# Patient Record
Sex: FEMALE | Race: WHITE | NOT HISPANIC OR LATINO | Employment: OTHER | URBAN - METROPOLITAN AREA
[De-identification: names, ages, dates, MRNs, and addresses within clinical notes are randomized per-mention and may not be internally consistent; named-entity substitution may affect disease eponyms.]

---

## 2022-09-13 LAB — NONINV COLON CA DNA+OCC BLD SCRN STL QL: NEGATIVE

## 2022-10-07 ENCOUNTER — OFFICE VISIT (OUTPATIENT)
Dept: PRIMARY CARE CLINIC | Facility: CLINIC | Age: 72
End: 2022-10-07
Payer: MEDICARE

## 2022-10-07 VITALS — WEIGHT: 193.81 LBS | TEMPERATURE: 98 F

## 2022-10-07 DIAGNOSIS — M15.9 PRIMARY OSTEOARTHRITIS INVOLVING MULTIPLE JOINTS: Chronic | ICD-10-CM

## 2022-10-07 DIAGNOSIS — C50.912 MALIGNANT NEOPLASM OF LEFT BREAST IN FEMALE, ESTROGEN RECEPTOR POSITIVE, UNSPECIFIED SITE OF BREAST: ICD-10-CM

## 2022-10-07 DIAGNOSIS — F41.1 GAD (GENERALIZED ANXIETY DISORDER): Chronic | ICD-10-CM

## 2022-10-07 DIAGNOSIS — G57.63 MORTON'S NEUROMA OF BOTH FEET: Chronic | ICD-10-CM

## 2022-10-07 DIAGNOSIS — L98.9 PERINEAL IRRITATION IN FEMALE: Chronic | ICD-10-CM

## 2022-10-07 DIAGNOSIS — Z85.3 HISTORY OF RIGHT BREAST CANCER: ICD-10-CM

## 2022-10-07 DIAGNOSIS — R00.0 TACHYCARDIA: Chronic | ICD-10-CM

## 2022-10-07 DIAGNOSIS — F32.5 MAJOR DEPRESSIVE DISORDER IN REMISSION, UNSPECIFIED WHETHER RECURRENT: Chronic | ICD-10-CM

## 2022-10-07 DIAGNOSIS — G47.09 OTHER INSOMNIA: Chronic | ICD-10-CM

## 2022-10-07 DIAGNOSIS — T78.40XA ALLERGY, INITIAL ENCOUNTER: Chronic | ICD-10-CM

## 2022-10-07 DIAGNOSIS — M79.10 MYALGIA: Chronic | ICD-10-CM

## 2022-10-07 DIAGNOSIS — G47.33 OSA ON CPAP: ICD-10-CM

## 2022-10-07 DIAGNOSIS — E03.9 ACQUIRED HYPOTHYROIDISM: Chronic | ICD-10-CM

## 2022-10-07 DIAGNOSIS — E78.5 HYPERLIPIDEMIA, UNSPECIFIED HYPERLIPIDEMIA TYPE: Chronic | ICD-10-CM

## 2022-10-07 DIAGNOSIS — E66.9 OBESITY WITH SERIOUS COMORBIDITY, UNSPECIFIED CLASSIFICATION, UNSPECIFIED OBESITY TYPE: ICD-10-CM

## 2022-10-07 DIAGNOSIS — Z17.0 MALIGNANT NEOPLASM OF LEFT BREAST IN FEMALE, ESTROGEN RECEPTOR POSITIVE, UNSPECIFIED SITE OF BREAST: ICD-10-CM

## 2022-10-07 DIAGNOSIS — R73.03 PREDIABETES: Chronic | ICD-10-CM

## 2022-10-07 PROCEDURE — 99203 OFFICE O/P NEW LOW 30 MIN: CPT | Mod: PBBFAC,PN | Performed by: INTERNAL MEDICINE

## 2022-10-07 PROCEDURE — 99999 PR PBB SHADOW E&M-NEW PATIENT-LVL III: ICD-10-PCS | Mod: PBBFAC,,, | Performed by: INTERNAL MEDICINE

## 2022-10-07 PROCEDURE — 99999 PR PBB SHADOW E&M-NEW PATIENT-LVL III: CPT | Mod: PBBFAC,,, | Performed by: INTERNAL MEDICINE

## 2022-10-07 PROCEDURE — 99215 PR OFFICE/OUTPT VISIT, EST, LEVL V, 40-54 MIN: ICD-10-PCS | Mod: S$PBB,,, | Performed by: INTERNAL MEDICINE

## 2022-10-07 PROCEDURE — 99215 OFFICE O/P EST HI 40 MIN: CPT | Mod: S$PBB,,, | Performed by: INTERNAL MEDICINE

## 2022-10-07 RX ORDER — CHOLECALCIFEROL (VITAMIN D3) 25 MCG
TABLET ORAL DAILY
COMMUNITY

## 2022-10-07 RX ORDER — CETIRIZINE HYDROCHLORIDE 10 MG/1
10 TABLET ORAL DAILY
COMMUNITY
End: 2023-10-24

## 2022-10-07 RX ORDER — METFORMIN HYDROCHLORIDE 500 MG/1
1000 TABLET ORAL DAILY
COMMUNITY
End: 2023-08-03 | Stop reason: SDUPTHER

## 2022-10-07 RX ORDER — ATORVASTATIN CALCIUM 20 MG/1
20 TABLET, FILM COATED ORAL DAILY
COMMUNITY
End: 2023-01-06

## 2022-10-07 RX ORDER — VENLAFAXINE HYDROCHLORIDE 150 MG/1
150 CAPSULE, EXTENDED RELEASE ORAL DAILY
COMMUNITY

## 2022-10-07 RX ORDER — TAMOXIFEN CITRATE 20 MG/1
20 TABLET ORAL DAILY
COMMUNITY
End: 2024-03-13

## 2022-10-07 RX ORDER — TRAZODONE HYDROCHLORIDE 50 MG/1
50 TABLET ORAL NIGHTLY
COMMUNITY
End: 2023-10-24 | Stop reason: SDUPTHER

## 2022-10-07 RX ORDER — LEVOTHYROXINE SODIUM 25 UG/1
25 TABLET ORAL
COMMUNITY
End: 2022-10-10 | Stop reason: SDUPTHER

## 2022-10-07 RX ORDER — MONTELUKAST SODIUM 10 MG/1
10 TABLET ORAL NIGHTLY
COMMUNITY
End: 2023-04-06 | Stop reason: SDUPTHER

## 2022-10-07 RX ORDER — VERAPAMIL HYDROCHLORIDE 120 MG/1
120 CAPSULE, EXTENDED RELEASE ORAL DAILY
COMMUNITY
End: 2022-12-05

## 2022-10-07 NOTE — PROGRESS NOTES
Alison Lobo  10/07/22  [id number]    Roselia Gillette MD  Patient Care Team:  Roselia Gillette MD as PCP - General (Internal Medicine)  Clarissa Stephenson NP as Nurse Practitioner (Family Medicine)    Visit Type: establish care    Chief Complaint:  Chief Complaint   Patient presents with    Establish Care     Generalized aches and pains      History of Present Illness: Ms. Alison Lobo is a 72 year old female who [facility] She is originally from [place] but left [place] 50+ years ago. 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 Recently returned to [place] for family reasons. 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    Medical issues include prediabetes, B Chun's neuroma/foot pain, JADA (CPAP), obesity, h/o R breast cancer 2002, L breast cancer 2018 (both small invasive ductal carcinoma), recurrent MDD, HLD, hypothyroid.    S/p hysterectomy and s/p lumpectomies and completed Tx for 2002 R breast cancer. Will be taking tamoxifen for L breast cancer at least through 2023.    Most recent local PCP, Dr. Grayson, [facility]. Has also established care w Gen surg Dr. Nathan at [facility] with whom she will f/u Q 6 mos for monitoring of her breast cancer, Psychiatrist Dr. Yates at [facility], in addition to Podiatrist, Pulmonologist and Ophthalmologist.    Current concerns include:  Sleep disturbance - sleep on-set insomnia, chronic  Generalized aches and pains (hands, shoulder, hips, knees)  3.   Chronic vaginal irritation   4.   L eye irritation - steroid drops not helping much.    Sleep study scheduled for this Sunday for CPAP titration and mask fitting.    [facility] Walking less recently due to foot pain and heat/humidity.      Would like any labwork or other studies done prior to scheduled in-person appointment so can  discuss at visit. Requests to check HgbA1c and Vit D.    Hosp/ED/Urgent Care:  22 [facility]    Recent appointments:   1 week ago Psychiatrist Dr. Yates [facility]  22 Podiatry [facility]  B Chun's neuroma/foot pain, R achilles tendinitis  22 Podiatry [facility]   [facility], [facility]  22 ENT Dr. Marisol ELIAS (consider [facility]?), seasonal allergies  22 Podiatry [facility]   22 Gen Surg Dr. Nathan [facility] breast cancer management  22 PCP Dr. Grayson [facility] referral to Psychiatry  22 PCP Dr. Grayson MARLEEN establish care  21 Daren Prince NP [facility] JADA, intolerant of CPAP    Upcoming appointments:  11/10/22 Gen Surg Dr. Jac SIM will f/u Q 6 mos  22 Psychiatry Dr. Yates 3 mos f/u     Future Appointments       Date Provider Specialty Appt Notes    2022 Roselia Gillette MD Primary Care 6 week f/u          The following were reviewed: Active problem list, medication list, allergies, family history, social history, and Health Maintenance.     History:  Past Medical History:   Diagnosis Date    Cancer     Hyperlipidemia      Past Surgical History:   Procedure Laterality Date    BREAST SURGERY       SECTION      HYSTERECTOMY       No family history on file.  Social History     Socioeconomic History    Marital status:      Patient Active Problem List   Diagnosis    Hyperlipidemia    Malignant neoplasm of left breast in female, estrogen receptor positive    History of right breast cancer    Obesity with serious comorbidity    Acquired hypothyroidism    JADA on CPAP    Tachycardia    Perineal irritation in female    Major depressive disorder in remission    MEHNAZ (generalized anxiety disorder)    Other insomnia    Prediabetes    Chun's neuroma of both feet    Primary osteoarthritis involving multiple joints    Myalgia    Allergies       Review of patient's allergies indicates:  No  Known Allergies    Medications:  Current Outpatient Medications on File Prior to Visit   Medication Sig Dispense Refill    atorvastatin (LIPITOR) 20 MG tablet Take 20 mg by mouth once daily.      cetirizine (ZYRTEC) 10 MG tablet Take 10 mg by mouth once daily.      levothyroxine (SYNTHROID) 25 MCG tablet Take 25 mcg by mouth before breakfast.      metFORMIN (GLUCOPHAGE) 500 MG tablet Take 1,000 mg by mouth once daily. Takes 2.5 500mg QD      montelukast (SINGULAIR) 10 mg tablet Take 10 mg by mouth every evening.      tamoxifen (NOLVADEX) 20 MG Tab Take 20 mg by mouth once daily.      traZODone (DESYREL) 50 MG tablet Take 50 mg by mouth every evening.      venlafaxine (EFFEXOR-XR) 150 MG Cp24 Take 150 mg by mouth once daily. Takes 2 pills QD      verapamiL (VERELAN) 120 MG C24P Take 120 mg by mouth once daily.      vitamin D (VITAMIN D3) 1000 units Tab once daily.       No current facility-administered medications on file prior to visit.       Medications have been reviewed and reconciled with patient at visit today.    Barriers to medications present (no )    Adverse reactions to current medications (no)    Over the counter medications reviewed (Yes) and if needed added to active Medication list.    Review of Systems   Constitutional:  Positive for activity change and fatigue.   HENT:  Negative for nasal congestion, dental problem, ear pain, sore throat and trouble swallowing.    Eyes:  Positive for itching and eye dryness.        L eye   Respiratory:  Negative for cough, shortness of breath and wheezing.         JADA - difficulty tolerating CPAP   Cardiovascular:  Negative for chest pain, palpitations, leg swelling and claudication.   Gastrointestinal:  Negative for abdominal pain, change in bowel habit and change in bowel habit.   Genitourinary:  Positive for vaginal dryness. Negative for bladder incontinence, dysuria and hematuria.        Vaginal irritation   Musculoskeletal:  Positive for arthralgias, gait  problem and myalgias.        Hands, shoulders, hips, knees, feet   Neurological:  Negative for dizziness, tremors, weakness and light-headedness.   Hematological:  Does not bruise/bleed easily.   Psychiatric/Behavioral:  Positive for sleep disturbance.         Chronic sleep on-set insomnia  JADA      Exam:  Vitals:    10/07/22 1301   Temp: 98 °F (36.7 °C)     Weight: 87.9 kg (193 lb 12.8 oz)   There is no height or weight on file to calculate BMI.    Physical Exam  Vitals reviewed.   Constitutional:       General: She is not in acute distress.     Appearance: Normal appearance. She is obese.   HENT:      Head: Normocephalic and atraumatic.      Right Ear: Tympanic membrane, ear canal and external ear normal.      Left Ear: Tympanic membrane, ear canal and external ear normal.      Nose: Nose normal. No congestion or rhinorrhea.      Mouth/Throat:      Mouth: Mucous membranes are moist.      Pharynx: Oropharynx is clear. No oropharyngeal exudate or posterior oropharyngeal erythema.   Eyes:      General: No scleral icterus.        Right eye: No discharge.         Left eye: No discharge.      Extraocular Movements: Extraocular movements intact.      Conjunctiva/sclera: Conjunctivae normal.   Neck:      Vascular: No carotid bruit.   Cardiovascular:      Rate and Rhythm: Regular rhythm.      Heart sounds: No murmur heard.     Comments: Borderline tachycardia (chronic per Ms. Lashell)  Pulmonary:      Effort: Pulmonary effort is normal.      Breath sounds: Normal breath sounds. No wheezing, rhonchi or rales.   Abdominal:      General: Bowel sounds are normal. There is no distension.      Palpations: Abdomen is soft.      Tenderness: There is no abdominal tenderness. There is no guarding.   Musculoskeletal:         General: Deformity present. No swelling.      Right lower leg: No edema.      Left lower leg: No edema.      Comments: Arthritic changes noted in hands   Lymphadenopathy:      Cervical: No cervical adenopathy.    Skin:     General: Skin is warm and dry.      Findings: No bruising, erythema or rash.   Neurological:      General: No focal deficit present.      Mental Status: She is alert and oriented to person, place, and time. Mental status is at baseline.      Cranial Nerves: No cranial nerve deficit.      Coordination: Coordination normal.      Gait: Gait normal.      Comments: L hip 4+/5 lifting from table against pressure and some discomfort w this effort   Psychiatric:         Mood and Affect: Mood normal.         Behavior: Behavior normal.         Thought Content: Thought content normal.         Judgment: Judgment normal.      Laboratory Reviewed: (Yes)  [facility] Labs 5/02/22: CMP wnl x glucose 109 CBC wnl HgbA1c 5.9% TSH 2.85 chol 137  HDL 40 LDL 54  5/19/21: vit D 45    Cologuard 9/13/22: Negative recommend re-screen 3 years.    DEXA 5/12/22:  L femoral neck T-score -0.9 L1-L4 T-score 4.0 Normal w FRAX score est 10 year hip frx risk 1.8%, any osteopenic frx risk 9.7%    Mammogram 5/05/22: There are scattered fibroglandular elements that could obscure a lesion on mammography.  FINDINGS: Bilateral lumpectomies with associated architectural distortion and focal asymmetry. Benign bilateral breast calcifications. IMPRESSION: No mammographic evidence of malignancy. BI-RADS: 2 - Benign Finding.      Assessment:   72 y.o. female with multiple co-morbid illnesses here for continued follow up of medical problems.      Diagnoses of Hyperlipidemia, unspecified hyperlipidemia type, Malignant neoplasm of left breast in female, estrogen receptor positive, unspecified site of breast, History of right breast cancer, Obesity with serious comorbidity, unspecified classification, unspecified obesity type, Acquired hypothyroidism, JADA on CPAP, Tachycardia, Perineal irritation in female, Major depressive disorder in remission, unspecified whether recurrent, MEHNAZ (generalized anxiety disorder), Other insomnia, Prediabetes, Chun's  neuroma of both feet, Primary osteoarthritis involving multiple joints, Myalgia, and Allergy, initial encounter were pertinent to this visit.      Plan:     Problem List Items Addressed This Visit          Neuro    Adiel's neuroma of both feet (Chronic)     Affecting gait and activities - cont f/u w Podiatry            Psychiatric    Major depressive disorder in remission (Chronic)     Stable - cont current Rx and f/u w psychiatry         MEHNAZ (generalized anxiety disorder) (Chronic)     Stable, cont current Rx and f/u w psychiatry - recommend daily movement, physical activity - discussed a couple of simple breathwork techmiques            Derm    Perineal irritation in female (Chronic)     Post-menopausal - atrophic vaginitis quite likely - will check w breast surg cxn risk of topical estrogen given h/o recurrent estrogen receptor positive breast cancer - meanwhile , advise hypoallergenic washes, detergents, no dryer sheets, soft cotton panties, hypoallergenic vaginal lubricant or moisturizer to area              Cardiac/Vascular    Hyperlipidemia (Chronic)     Repeat lipid panel - consider check C(P)K given c/o fatigue, myalgias - consider add CoQ10 - if LDL < 70 consider decrease dose of statin?         Tachycardia (Chronic)     Chronic per Ms. Lashell - taking verapamil - prior cardiology evaluation?            Oncology    Malignant neoplasm of left breast in female, estrogen receptor positive     S/p lumpectomy 2018 - cont f/u w Gen Surg q 6 mos - cont surveillance mammograms - cont tamoxifen         History of right breast cancer     2002 s/p lumpectomy and treatment            Endocrine    Acquired hypothyroidism (Chronic)     Last TSH wnl - cont low dose levothyroxine - repeat TSH next routine labs         Prediabetes (Chronic)     Cont metformin - cont healthier food and beverage choices - encourage increase movement, daily physical activity - recheck HgbA1c prior to next appointment          Obesity with  serious comorbidity     No height recorded - do not have BMI today - comorbidities include cardiac arrhythmia, JADA, prediabetes - encourage daily movement to extent able and cont healthy food and beverage choices            Orthopedic    Primary osteoarthritis involving multiple joints (Chronic)     Consider trial diclofenac gel? Encourage to stay active and mobile to extent able - consider check CRP/ADELE/RF?         Myalgia (Chronic)     Encourage to stay mobile and active to extent able - work on improving sleep - check CPK and mag w next labs - consider add CoQ10, maybe decrease statin?            Other    Other insomnia (Chronic)     Sleep onset insomnia - taking trazodone - discussed some strategies, techniques that may help w sleep - Ms. Lobo is in agreement w referral to [facility] Dianna Mcnulty for sleep focused CBT - [facility]         Allergies (Chronic)     Managing w cetirizine, singulair (eos not elevated)         JADA on CPAP     F/u sleep study scheduled for this weekend            Health Maintenance         Date Due Completion Date    Hepatitis C Screening Never done ---    Lipid Panel Never done ---    TETANUS VACCINE Never done ---    Mammogram Never done ---    DEXA Scan Never done ---    Colorectal Cancer Screening Never done ---    Shingles Vaccine (1 of 2) Never done ---    Pneumococcal Vaccines (Age 65+) (1 - PCV) Never done ---          Cologuard 9/13/22  DEXA 5/12/22   Mammogram 5/05/22    -Patient's lab results were reviewed and discussed with patient  -Treatment options and alternatives were discussed with the patient. Patient expressed understanding. Patient was given the opportunity to ask questions and be an active participant in their medical care. Patient had no further questions or concerns at this time.   -Documentation of patient's health and condition was obtained from family member who was present during visit.  -Patient is an overall moderate risk for health  complications from their medical conditions.     Follow up: Follow up in about 6 weeks (around 11/18/2022) for Follow Up.    Care Plan/Goals: Reviewed No   Goals    None       After visit summary printed and given to patient upon discharge.  Patient goals and care plan are included in After visit summary.    TOTAL TIME evaluating and managing this patient for this encounter was greater than 60 minutes. This time was spent personally by me on the following activities: review of patient's past medical history, assessing age-appropriate health maintenance needs, review of any interval history, review and interpretation of lab results, review and interpretation of imaging test results, review and interpretation of cardiology test results, reviewing consulting specialist notes, obtaining history from the patient and family, examination of the patient, medication reconciliation, managing and/or ordering prescription medications, ordering imaging tests, ordering referral to subspecialty provider(s), educating patient and answering their questions about diagnosis, treatment plan, and goals of treatment, discussing planned follow-up and final documentation of the visit. This time was exclusive of any separately billable procedures for this patient and exclusive of time spent treating any other patients.

## 2022-10-07 NOTE — PATIENT INSTRUCTIONS
Consider discuss w Gen Surg topical estrogen use for possible atrophic vaginitis  Meanwhile consider try a topical vaginal/lubricant/moisturizer.    Try get natural light with walk in am - getting late afternoon/early afternoon night may also help w sleep    Anticipate call from Dianna Mcnulty LCSW to discuss possible focused CBT.    Will order labwork for mid Nov - 3- 7 days prior to next f/u appt

## 2022-10-08 ENCOUNTER — PATIENT MESSAGE (OUTPATIENT)
Dept: PRIMARY CARE CLINIC | Facility: CLINIC | Age: 72
End: 2022-10-08
Payer: MEDICARE

## 2022-10-08 NOTE — ASSESSMENT & PLAN NOTE
Post-menopausal - atrophic vaginitis quite likely - will check w breast surg cxn risk of topical estrogen given h/o recurrent estrogen receptor positive breast cancer - meanwhile , advise hypoallergenic washes, detergents, no dryer sheets, soft cotton panties, hypoallergenic vaginal lubricant or moisturizer to area

## 2022-10-08 NOTE — ASSESSMENT & PLAN NOTE
Consider trial diclofenac gel? Encourage to stay active and mobile to extent able - consider check CRP/ADELE/RF?

## 2022-10-08 NOTE — ASSESSMENT & PLAN NOTE
Stable, cont current Rx and f/u w psychiatry - recommend daily movement, physical activity - discussed a couple of simple breathwork techmiques

## 2022-10-08 NOTE — ASSESSMENT & PLAN NOTE
Encourage to stay mobile and active to extent able - work on improving sleep - check CPK and mag w next labs - consider add CoQ10, maybe decrease statin?

## 2022-10-08 NOTE — ASSESSMENT & PLAN NOTE
Cont metformin - cont healthier food and beverage choices - encourage increase movement, daily physical activity - recheck HgbA1c prior to next appointment

## 2022-10-08 NOTE — ASSESSMENT & PLAN NOTE
Repeat lipid panel - consider check C(P)K given c/o fatigue, myalgias - consider add CoQ10 - if LDL < 70 consider decrease dose of statin?

## 2022-10-08 NOTE — ASSESSMENT & PLAN NOTE
No height recorded - do not have BMI today - comorbidities include cardiac arrhythmia, JADA, prediabetes - encourage daily movement to extent able and cont healthy food and beverage choices

## 2022-10-08 NOTE — ASSESSMENT & PLAN NOTE
Sleep onset insomnia - taking trazodone - discussed some strategies, techniques that may help w sleep - [facility]

## 2022-10-10 ENCOUNTER — PATIENT MESSAGE (OUTPATIENT)
Dept: PRIMARY CARE CLINIC | Facility: CLINIC | Age: 72
End: 2022-10-10
Payer: MEDICARE

## 2022-10-10 RX ORDER — LEVOTHYROXINE SODIUM 25 UG/1
25 TABLET ORAL
Qty: 30 TABLET | Refills: 5 | Status: SHIPPED | OUTPATIENT
Start: 2022-10-10 | End: 2023-04-14

## 2022-10-11 ENCOUNTER — PATIENT MESSAGE (OUTPATIENT)
Dept: ADMINISTRATIVE | Facility: HOSPITAL | Age: 72
End: 2022-10-11
Payer: MEDICARE

## 2022-10-13 ENCOUNTER — DOCUMENTATION ONLY (OUTPATIENT)
Dept: PRIMARY CARE CLINIC | Facility: CLINIC | Age: 72
End: 2022-10-13
Payer: MEDICARE

## 2022-10-13 ENCOUNTER — PATIENT MESSAGE (OUTPATIENT)
Dept: PRIMARY CARE CLINIC | Facility: CLINIC | Age: 72
End: 2022-10-13
Payer: MEDICARE

## 2022-10-13 NOTE — PROGRESS NOTES
ANTELMO received a referral from PCP and spoke with Ms. Lobo on the phone to offer support. She scheduled an in person session with ANTELMO on 10/20/22 at 11:45am

## 2022-10-19 ENCOUNTER — PATIENT MESSAGE (OUTPATIENT)
Dept: PRIMARY CARE CLINIC | Facility: CLINIC | Age: 72
End: 2022-10-19
Payer: MEDICARE

## 2022-11-02 ENCOUNTER — PATIENT MESSAGE (OUTPATIENT)
Dept: PRIMARY CARE CLINIC | Facility: CLINIC | Age: 72
End: 2022-11-02
Payer: MEDICARE

## 2022-11-02 DIAGNOSIS — M15.9 PRIMARY OSTEOARTHRITIS INVOLVING MULTIPLE JOINTS: ICD-10-CM

## 2022-11-02 DIAGNOSIS — R73.03 PREDIABETES: ICD-10-CM

## 2022-11-02 DIAGNOSIS — M25.50 ARTHRALGIA, UNSPECIFIED JOINT: Primary | ICD-10-CM

## 2022-11-02 DIAGNOSIS — E03.9 ACQUIRED HYPOTHYROIDISM: ICD-10-CM

## 2022-11-02 DIAGNOSIS — E55.9 VITAMIN D DEFICIENCY: ICD-10-CM

## 2022-11-02 DIAGNOSIS — E78.5 HYPERLIPIDEMIA, UNSPECIFIED HYPERLIPIDEMIA TYPE: ICD-10-CM

## 2022-11-02 DIAGNOSIS — M79.10 MYALGIA: ICD-10-CM

## 2022-11-07 ENCOUNTER — PATIENT MESSAGE (OUTPATIENT)
Dept: PRIMARY CARE CLINIC | Facility: CLINIC | Age: 72
End: 2022-11-07
Payer: MEDICARE

## 2022-11-07 ENCOUNTER — LAB VISIT (OUTPATIENT)
Dept: LAB | Facility: HOSPITAL | Age: 72
End: 2022-11-07
Attending: INTERNAL MEDICINE
Payer: MEDICARE

## 2022-11-07 ENCOUNTER — TELEPHONE (OUTPATIENT)
Dept: PRIMARY CARE CLINIC | Facility: CLINIC | Age: 72
End: 2022-11-07
Payer: MEDICARE

## 2022-11-07 ENCOUNTER — DOCUMENTATION ONLY (OUTPATIENT)
Dept: PRIMARY CARE CLINIC | Facility: CLINIC | Age: 72
End: 2022-11-07
Payer: MEDICARE

## 2022-11-07 DIAGNOSIS — E78.5 HYPERLIPIDEMIA, UNSPECIFIED HYPERLIPIDEMIA TYPE: Chronic | ICD-10-CM

## 2022-11-07 DIAGNOSIS — R73.03 PREDIABETES: ICD-10-CM

## 2022-11-07 DIAGNOSIS — E55.9 VITAMIN D DEFICIENCY: ICD-10-CM

## 2022-11-07 DIAGNOSIS — M79.10 MYALGIA: ICD-10-CM

## 2022-11-07 DIAGNOSIS — M79.10 MYALGIA: Primary | ICD-10-CM

## 2022-11-07 LAB
25(OH)D3+25(OH)D2 SERPL-MCNC: 58 NG/ML (ref 30–96)
ALBUMIN SERPL BCP-MCNC: 3.7 G/DL (ref 3.5–5.2)
ANION GAP SERPL CALC-SCNC: 10 MMOL/L (ref 8–16)
BUN SERPL-MCNC: 20 MG/DL (ref 8–23)
CALCIUM SERPL-MCNC: 9.8 MG/DL (ref 8.7–10.5)
CHLORIDE SERPL-SCNC: 106 MMOL/L (ref 95–110)
CHOLEST SERPL-MCNC: 138 MG/DL (ref 120–199)
CHOLEST/HDLC SERPL: 3.4 {RATIO} (ref 2–5)
CK SERPL-CCNC: 36 U/L (ref 20–180)
CO2 SERPL-SCNC: 23 MMOL/L (ref 23–29)
CREAT SERPL-MCNC: 0.6 MG/DL (ref 0.5–1.4)
EST. GFR  (NO RACE VARIABLE): >60 ML/MIN/1.73 M^2
GLUCOSE SERPL-MCNC: 79 MG/DL (ref 70–110)
HDLC SERPL-MCNC: 41 MG/DL (ref 40–75)
HDLC SERPL: 29.7 % (ref 20–50)
LDLC SERPL CALC-MCNC: 67 MG/DL (ref 63–159)
MAGNESIUM SERPL-MCNC: 1.9 MG/DL (ref 1.6–2.6)
NONHDLC SERPL-MCNC: 97 MG/DL
PHOSPHATE SERPL-MCNC: 3.3 MG/DL (ref 2.7–4.5)
POTASSIUM SERPL-SCNC: 4.4 MMOL/L (ref 3.5–5.1)
SODIUM SERPL-SCNC: 139 MMOL/L (ref 136–145)
TRIGL SERPL-MCNC: 150 MG/DL (ref 30–150)

## 2022-11-07 PROCEDURE — 80061 LIPID PANEL: CPT | Performed by: INTERNAL MEDICINE

## 2022-11-07 PROCEDURE — 83735 ASSAY OF MAGNESIUM: CPT | Performed by: INTERNAL MEDICINE

## 2022-11-07 PROCEDURE — 36415 COLL VENOUS BLD VENIPUNCTURE: CPT | Mod: PO | Performed by: INTERNAL MEDICINE

## 2022-11-07 PROCEDURE — 82550 ASSAY OF CK (CPK): CPT | Performed by: INTERNAL MEDICINE

## 2022-11-07 PROCEDURE — 80069 RENAL FUNCTION PANEL: CPT | Performed by: INTERNAL MEDICINE

## 2022-11-07 PROCEDURE — 82306 VITAMIN D 25 HYDROXY: CPT | Performed by: INTERNAL MEDICINE

## 2022-11-07 NOTE — TELEPHONE ENCOUNTER
Patient was contacted and her lab appointment was scheduled for this morning at 9:45 am. Patient was informed that the labs are fasting, she verbally understood the information given.

## 2022-11-07 NOTE — TELEPHONE ENCOUNTER
----- Message from Roselia Gillette MD sent at 11/7/2022  5:20 AM CST -----  Regarding: fasting labwork  Good morning - Ms. Lobo was going to be coming in to see Dianna today - we'd planned for her to have labwork prior to our next f/u appt - she's scheduled w me for 11/18. I've entered orders for labwork to be done today - best if could be done fasting if you could please call her to see if she can come fasting today - if not, can reschedule for another day this week.    Thank you!

## 2022-11-07 NOTE — PROGRESS NOTES
[facility] BEHAVIORAL HEALTH INTAKE    DATE:  11/7/2022  REFERRAL SOURCE:  Roselia Gillette MD  TYPE OF VISIT:  In person  LENGTH OF SESSION: 45  .  HISTORY OF PRESENTING ILLNESS:  Alison Lobo, a 72 y.o. female with history of Anxiety disorders; generalized anxiety disorder [F41.1].    CHIEF COMPLAINT/REASON FOR ENCOUNTER: Pt presented for initial assessment. Pt's chief complaint includes the following: anxiety.    PSYCHIATRIC HISTORY:  Patient does not currently have a psychiatrist.  Patient does not currently have a therapist. * she has seen a mental health professional in the past   Previous Psychiatric Hospitalizations:  No  History of Trauma:  No  History of Violence:  No  Access to a gun/weapon:  Yes  Previous Suicide Attempts:  No    Risk assessment:  Patient reports no suicidal ideation  Patient reports no homicidal ideation  Patient reports no self-injurious behavior  Patient reports no violent behavior    PSYCHIATRIC FAMILY HISTORY: not known    SUBSTANCE ABUSE HISTORY:  Tobacco:  No   Alcohol: none  Illicit Substances: No  Misuse of Prescription Medications:  No    MEDICAL HISTORY:  Past Medical History:   Diagnosis Date    Cancer     Hyperlipidemia          SOCIAL HISTORY (MARRIAGE, EMPLOYMENT, etc.):  Living Situation: lives alone   Relationship status single    Children/Family: 1 child   Supports: reports many friendships   Education/Vocation: PhD  Confucianism/Spirituality: no  Hobbies and Interests: art/painting    Current social stressors:   Concerns related to aging and anxiety. Trouble sleeping (life long sleep issues.) [place] [facility] She retired in [place] after 45 years, so moving back to  is a culture shock.     Current symptoms:  Depression: dysphoric mood and insomnia.  Anxiety: excessive worrying.  Insomnia: difficulty falling asleep and non-restful sleep.  Heidi:  denies.  Psychosis: denies .    MENTAL HEALTH STATUS EXAM  General Appearance:  unremarkable, age  appropriate   Speech: normal tone, normal rate, normal pitch, normal volume      Level of Cooperation: cooperative      Thought Processes: normal and logical   Mood: steady      Thought Content: normal, no suicidality, no homicidality, delusions, or paranoia   Affect: congruent and appropriate   Orientation: Oriented x3   Memory: recent >  intact, remote >  intact   Attention Span & Concentration: intact   Fund of General Knowledge: intact and appropriate to age and level of education   Judgment & Insight: good   Language  intact     Session Content/Presenting Problem Hx:  [facility]. She relocated to [place] after retiring in [place] after 45 years and she is having trouble to adjusting. She said covid changed her plans drastically and she has had to modify her perspective. She said aging has been difficult and it presents more opportunities to worry and she would like to develop better coping strategies to deal with her anxiety. She has chronic sleep issues, but reports that she could improve her sleepy hygiene practices. Her goals include: reducing her anxiety and learning new techniques, improved sleep, more focus.     [facility] her a couple behavioral modification practices between now and her next visit: She will track her bedtime routine by writing down what she is doing up to 2 hours before trying to go to sleep. She will also write down at least three behaviors/activities that she engages in this week that help reduce her anxiety (she said walking is one of them.) She is going to track her negative automatic thoughts related to aging and practice a reality checking strategy LCSW reviewed and practiced in session using a scale of 0-10 to dimitry her anxiety about a situation. LCSW to review upon next session.      STRENGTHS AND LIABILITIES: Strength: Patient accepts guidance/feedback, Strength: Patient is expressive/articulate., Strength: Patient is  intelligent., Strength: Patient is motivated for change.    IMPRESSION:   My diagnostic impression is Anxiety disorders; generalized anxiety disorder [F41.1]    TREATMENT GOALS: Anxiety: acquiring relapse prevention skills, reducing negative automatic thoughts, and reducing time spent worrying (<30 minutes/day)    PLAN: In this session a psychosocial evaluation was conducted to get history and determine a treatment plan. CBT will be utilized in future individual  therapy sessions to increase support and behavior modification.     NEXT APPOINTMENT:  11/14 at 11am

## 2022-11-08 ENCOUNTER — PATIENT MESSAGE (OUTPATIENT)
Dept: PRIMARY CARE CLINIC | Facility: CLINIC | Age: 72
End: 2022-11-08
Payer: MEDICARE

## 2022-11-08 DIAGNOSIS — M25.50 ARTHRALGIA, UNSPECIFIED JOINT: ICD-10-CM

## 2022-11-08 DIAGNOSIS — R73.03 PREDIABETES: Primary | ICD-10-CM

## 2022-11-08 DIAGNOSIS — E03.9 ACQUIRED HYPOTHYROIDISM: Chronic | ICD-10-CM

## 2022-11-08 NOTE — PROGRESS NOTES
Your results look fine and do not require any change in treatment.     Please contact me if you have any additional concerns.    Sincerely,    Roselia Gillette

## 2022-11-14 ENCOUNTER — DOCUMENTATION ONLY (OUTPATIENT)
Dept: PRIMARY CARE CLINIC | Facility: CLINIC | Age: 72
End: 2022-11-14
Payer: MEDICARE

## 2022-11-14 NOTE — PROGRESS NOTES
Individual Psychotherapy (ANTELMO)  Alison Lobo,  11/14/2022  DATE:  11/14/2022  TYPE OF VISIT:  In person  LENGTH OF SESSION: 45    Therapeutic Intervention: Met with patient for individual psychotherapy.    Chief complaint/reason for encounter: depression and anxiety       Session Content/Presenting Problem:  LCSW reviewed previous assignments with patient. She journals throughout the week and brought her notes in and said it was shocking to see how many automatic negative thoughts she has throughout the week. She reported that she can manage her anxiety by staying active/engaged, but she has trouble going to sleep at night and is traditionally a night owl. LCSW reviewed sleep hygiene and discussed a couple things to try in the next week between sessions: She usually drinks caffeine until 4pm, but she will try to move that time up. She will make a routine that helps wind her down and is not stimulating (she will try to end engaging night phone calls with friends by 9pm for example), she will stop screen/tv time by 9pm or 2 hours before bedtime (she set bedtime to 11pm.) She will journal before bed to help process some of her thoughts since she stays too busy during the day to process her thoughts.     She also shared a concern about not getting pleasure from eating, especially breakfast, but she is a late riser and doesn't like breakfast food. She expressed symptoms of anhedonia when engaging with food and LCSW suggested mindfulness practices during meals to reduce distractions to determine if it is appetite related or if she is not enjoying the experience of eating.     Current symptoms:  Depression: worthlessness/guilt and hopelessness.  Anxiety: excessive worrying and restlessness.  Insomnia: non-restful sleep and trouble falling asleep   Heidi:  denies.  Psychosis: denies .    Risk parameters:  Patient reports no suicidal ideation  Patient reports no homicidal ideation  Patient reports no self-injurious  behavior  Patient reports no violent behavior    MENTAL HEALTH STATUS EXAM  General Appearance:  unremarkable, age appropriate   Speech: normal tone, normal rate, normal pitch, normal volume      Level of Cooperation: cooperative      Thought Processes: normal and logical   Mood: Anxious mood      Thought Content: normal, no suicidality, no homicidality, delusions, or paranoia   Affect: congruent and appropriate   Orientation: Oriented x3   Attention Span & Concentration: intact   Fund of General Knowledge: intact and appropriate to age and level of education   Judgment & Insight: good     Language  intact       STRENGTHS AND LIABILITIES: Strength: Patient accepts guidance/feedback, Strength: Patient is expressive/articulate., Strength: Patient is intelligent., Strength: Patient is motivated for change.    IMPRESSION:   My diagnostic impression is Anxiety disorders; generalized anxiety disorder [F41.1] and Persistent Depressive Disorder, Dysthymia 300.4 (F34.1)    TREATMENT GOALS (per patient):    Treatment plan:  Target symptoms: anxiety   Why chosen therapy is appropriate versus another modality: relevant to diagnosis  Outcome monitoring methods: self-report, observation  Therapeutic intervention type: behavior modifying psychotherapy    Patient's response to intervention:  The patient's response to intervention is accepting.    Progress toward goals and other mental status changes:  The patient's progress toward goals is good.    Plan: Pt plans to continue individual psychotherapy CBT will be utilized in future individual therapy sessions to increase behavior modification.     Return to clinic: 2 weeks

## 2022-11-17 ENCOUNTER — LAB VISIT (OUTPATIENT)
Dept: LAB | Facility: HOSPITAL | Age: 72
End: 2022-11-17
Attending: NURSE PRACTITIONER
Payer: MEDICARE

## 2022-11-17 DIAGNOSIS — R73.03 PREDIABETES: ICD-10-CM

## 2022-11-17 DIAGNOSIS — M25.50 ARTHRALGIA, UNSPECIFIED JOINT: ICD-10-CM

## 2022-11-17 DIAGNOSIS — E03.9 ACQUIRED HYPOTHYROIDISM: Chronic | ICD-10-CM

## 2022-11-17 LAB
CRP SERPL-MCNC: 0.8 MG/L (ref 0–8.2)
ESTIMATED AVG GLUCOSE: 117 MG/DL (ref 68–131)
HBA1C MFR BLD: 5.7 % (ref 4–5.6)
RHEUMATOID FACT SERPL-ACNC: <13 IU/ML (ref 0–15)
TSH SERPL DL<=0.005 MIU/L-ACNC: 1.85 UIU/ML (ref 0.4–4)

## 2022-11-17 PROCEDURE — 83036 HEMOGLOBIN GLYCOSYLATED A1C: CPT | Performed by: INTERNAL MEDICINE

## 2022-11-17 PROCEDURE — 84443 ASSAY THYROID STIM HORMONE: CPT | Performed by: INTERNAL MEDICINE

## 2022-11-17 PROCEDURE — 86235 NUCLEAR ANTIGEN ANTIBODY: CPT | Mod: 59 | Performed by: INTERNAL MEDICINE

## 2022-11-17 PROCEDURE — 36415 COLL VENOUS BLD VENIPUNCTURE: CPT | Mod: PO | Performed by: INTERNAL MEDICINE

## 2022-11-17 PROCEDURE — 86140 C-REACTIVE PROTEIN: CPT | Performed by: INTERNAL MEDICINE

## 2022-11-17 PROCEDURE — 86038 ANTINUCLEAR ANTIBODIES: CPT | Performed by: INTERNAL MEDICINE

## 2022-11-17 PROCEDURE — 86039 ANTINUCLEAR ANTIBODIES (ANA): CPT | Performed by: INTERNAL MEDICINE

## 2022-11-17 PROCEDURE — 86431 RHEUMATOID FACTOR QUANT: CPT | Performed by: INTERNAL MEDICINE

## 2022-11-18 ENCOUNTER — PATIENT MESSAGE (OUTPATIENT)
Dept: ADMINISTRATIVE | Facility: HOSPITAL | Age: 72
End: 2022-11-18
Payer: MEDICARE

## 2022-11-18 ENCOUNTER — OFFICE VISIT (OUTPATIENT)
Dept: PRIMARY CARE CLINIC | Facility: CLINIC | Age: 72
End: 2022-11-18
Payer: MEDICARE

## 2022-11-18 VITALS
HEART RATE: 106 BPM | WEIGHT: 191.13 LBS | HEIGHT: 63 IN | DIASTOLIC BLOOD PRESSURE: 78 MMHG | OXYGEN SATURATION: 95 % | SYSTOLIC BLOOD PRESSURE: 130 MMHG | TEMPERATURE: 99 F | BODY MASS INDEX: 33.87 KG/M2

## 2022-11-18 DIAGNOSIS — Z12.11 SCREENING FOR COLON CANCER: ICD-10-CM

## 2022-11-18 DIAGNOSIS — M25.511 CHRONIC PAIN OF BOTH SHOULDERS: ICD-10-CM

## 2022-11-18 DIAGNOSIS — M25.512 CHRONIC PAIN OF BOTH SHOULDERS: ICD-10-CM

## 2022-11-18 DIAGNOSIS — R26.89 BALANCE PROBLEM: ICD-10-CM

## 2022-11-18 DIAGNOSIS — R76.8 ANA POSITIVE: ICD-10-CM

## 2022-11-18 DIAGNOSIS — R00.0 TACHYCARDIA: Primary | Chronic | ICD-10-CM

## 2022-11-18 DIAGNOSIS — M25.50 ARTHRALGIA, UNSPECIFIED JOINT: ICD-10-CM

## 2022-11-18 DIAGNOSIS — G89.29 CHRONIC PAIN OF BOTH SHOULDERS: ICD-10-CM

## 2022-11-18 DIAGNOSIS — M79.10 MYALGIA: ICD-10-CM

## 2022-11-18 LAB
ANA PATTERN 1: NORMAL
ANA SER QL IF: POSITIVE
ANA TITR SER IF: NORMAL {TITER}

## 2022-11-18 PROCEDURE — 99999 PR PBB SHADOW E&M-EST. PATIENT-LVL V: CPT | Mod: PBBFAC,,, | Performed by: INTERNAL MEDICINE

## 2022-11-18 PROCEDURE — 99214 OFFICE O/P EST MOD 30 MIN: CPT | Mod: S$PBB,,, | Performed by: INTERNAL MEDICINE

## 2022-11-18 PROCEDURE — 99215 OFFICE O/P EST HI 40 MIN: CPT | Mod: PBBFAC,PN | Performed by: INTERNAL MEDICINE

## 2022-11-18 PROCEDURE — 99214 PR OFFICE/OUTPT VISIT, EST, LEVL IV, 30-39 MIN: ICD-10-PCS | Mod: S$PBB,,, | Performed by: INTERNAL MEDICINE

## 2022-11-18 PROCEDURE — 99999 PR PBB SHADOW E&M-EST. PATIENT-LVL V: ICD-10-PCS | Mod: PBBFAC,,, | Performed by: INTERNAL MEDICINE

## 2022-11-18 RX ORDER — FLUTICASONE PROPIONATE 50 MCG
1 SPRAY, SUSPENSION (ML) NASAL DAILY
Qty: 9.9 ML | Refills: 0 | Status: SHIPPED | OUTPATIENT
Start: 2022-11-18 | End: 2023-01-17

## 2022-11-18 NOTE — PROGRESS NOTES
Alison Lobo  11/18/2022  [id number]    Roselia Gillette MD  Patient Care Team:  Roselia Gillette MD as PCP - General (Internal Medicine)  Clarissa Stephenson NP as Nurse Practitioner (Family Medicine)    Visit Type: routine scheduled f/u    Chief Complaint:  Chief Complaint   Patient presents with    Follow-up     Patient in for six week follow up and lab results      History of Present Illness: Ms. Alison Lobo is a 72 year old female here for 6 wk f/u. She is originally from [place] but left [place] 50+ years ago. 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 Recently returned to [place] for family reasons. Feeling less stressed having taken care of various house problems.    Medical issues include prediabetes, B Chun's neuroma/foot pain, JADA (CPAP), obesity, h/o R breast cancer 2002, L breast cancer 2018 (both small invasive ductal carcinoma), recurrent MDD, HLD, hypothyroid.    Still issues w foot pain limiting exercise - Mortons neuroma.   Cont c/o poor balance but thinks maybe mostly related to foot/feet?    Difficulty w CPAP.    S/p hysterectomy and s/p lumpectomies and completed Tx for 2002 R breast cancer. Will be taking tamoxifen for L breast cancer at least through 2023.    Has also established care w Gen surg Dr. Nathan at [facility] with whom she will f/u Q 6 mos for monitoring of her breast cancer, Psychiatrist Dr. Yates at [facility], in addition to Podiatrist, Pulmonologist and Ophthalmologist.    Here at [facility] she's met w Dianna Mcnulty LCSW, with plan for further f/u. She is interested in working on balance w PT here.    She is looking forward to getting away over Thanksgiving to spend time w people she cares about.    Hosp/ED/Urgent Care:  8/23/22 [facility]    Recent appointments:   11/14/22 [facility] Dianna Mcnulty LCSW  11/07/22 [facility] Dianna Mcnulty LCSW intake  10/07/22 [facility] Dr. Gillette establish care    Recent L  shoulder injection?  Sleep study?(scheduled for CPAP titration and mask fitting).  11/10/22 Gen Surg Dr. Nathan [facility] cancelled ( on leave of absence - plan is for Q 6mos f/u)    Upcoming appointments:  22 Psychiatry Dr. Yates 3 mos f/u     Future Appointments       Date Provider Specialty Appt Notes    2022 Osmani Reyes MD Cardiology Referral    2022 Roselia Gillette MD Primary Care f/u    2022 Javi Bess MD Rheumatology Myalgia [M79.10]  Arthralgia, unspecified joint [M25.50]  ADELE positive [R76.8]          The following were reviewed: Active problem list, medication list, allergies, family history, social history, and Health Maintenance.     History:  Past Medical History:   Diagnosis Date    Cancer     Hyperlipidemia      Past Surgical History:   Procedure Laterality Date    BREAST SURGERY       SECTION      HYSTERECTOMY       History reviewed. No pertinent family history.  Social History     Socioeconomic History    Marital status:    Tobacco Use    Smoking status: Never    Smokeless tobacco: Never   Substance and Sexual Activity    Drug use: Never     Patient Active Problem List   Diagnosis    Hyperlipidemia    Malignant neoplasm of left breast in female, estrogen receptor positive    History of right breast cancer    Obesity with serious comorbidity    Acquired hypothyroidism    JADA on CPAP    Tachycardia    Perineal irritation in female    Major depressive disorder in remission    MEHNAZ (generalized anxiety disorder)    Other insomnia    Prediabetes    Chun's neuroma of both feet    Primary osteoarthritis involving multiple joints    Myalgia    Allergies       Review of patient's allergies indicates:  No Known Allergies    Medications:  Current Outpatient Medications on File Prior to Visit   Medication Sig Dispense Refill    atorvastatin (LIPITOR) 20 MG tablet Take 20 mg by mouth once daily.      cetirizine (ZYRTEC) 10 MG tablet Take 10 mg by mouth once  daily.      levothyroxine (SYNTHROID) 25 MCG tablet Take 1 tablet (25 mcg total) by mouth before breakfast. 30 tablet 5    metFORMIN (GLUCOPHAGE) 500 MG tablet Take 1,000 mg by mouth once daily. Takes 2.5 500mg QD      montelukast (SINGULAIR) 10 mg tablet Take 10 mg by mouth every evening.      tamoxifen (NOLVADEX) 20 MG Tab Take 20 mg by mouth once daily.      traZODone (DESYREL) 50 MG tablet Take 50 mg by mouth every evening.      venlafaxine (EFFEXOR-XR) 150 MG Cp24 Take 150 mg by mouth once daily. Takes 2 pills QD      verapamiL (VERELAN) 120 MG C24P Take 120 mg by mouth once daily.      vitamin D (VITAMIN D3) 1000 units Tab once daily.       No current facility-administered medications on file prior to visit.     Medications have been reviewed and reconciled with patient at visit today.    Barriers to medications present (no )    Adverse reactions to current medications (no)    Over the counter medications reviewed (Yes) and if needed added to active Medication list.    Review of Systems   Constitutional:  Positive for appetite change and fatigue.        Poor appetite  Thinks she may have lost weight   HENT:  Positive for sinus pressure/congestion. Negative for nasal congestion, dental problem, ear pain, sore throat and trouble swallowing.         Chronic sinus issues    Eyes:  Positive for itching and eye dryness.        L eye   Respiratory:  Negative for cough, shortness of breath and wheezing.         JADA - difficulty tolerating CPAP   Cardiovascular:  Negative for chest pain, palpitations, leg swelling and claudication.   Gastrointestinal:  Negative for abdominal pain, change in bowel habit and change in bowel habit.   Genitourinary:  Positive for vaginal dryness. Negative for bladder incontinence, dysuria and hematuria.   Musculoskeletal:  Positive for arthralgias, gait problem and myalgias.        Hands, shoulders, hips, knees, feet   Neurological:  Negative for dizziness, tremors, weakness and  light-headedness.        C/o poor balance   Hematological:  Does not bruise/bleed easily.   Psychiatric/Behavioral:  Positive for dysphoric mood and sleep disturbance.         Chronic sleep on-set insomnia  JADA      Exam:  Initial VSS: /90 P 106 sat 95% T 99.2  Vitals:    11/18/22 1009   BP: 130/78   Pulse:    Temp:      Weight: 86.7 kg (191 lb 1.6 oz)   Body mass index is 33.85 kg/m².    Physical Exam  Vitals reviewed.   Constitutional:       General: She is not in acute distress.     Appearance: Normal appearance. She is obese. She is not ill-appearing.   HENT:      Head: Normocephalic and atraumatic.      Right Ear: Ear canal and external ear normal.      Left Ear: Ear canal and external ear normal.      Ears:      Comments: R serous efusion     Nose: Nose normal. No congestion or rhinorrhea.      Mouth/Throat:      Mouth: Mucous membranes are moist.   Eyes:      General: No scleral icterus.        Right eye: No discharge.         Left eye: No discharge.      Extraocular Movements: Extraocular movements intact.      Conjunctiva/sclera: Conjunctivae normal.   Neck:      Vascular: No carotid bruit.   Cardiovascular:      Rate and Rhythm: Regular rhythm. Tachycardia present.      Heart sounds: No murmur heard.     Comments: Borderline tachycardia (chronic per Ms. Lashell)  Pulmonary:      Effort: Pulmonary effort is normal.      Breath sounds: Normal breath sounds. No wheezing, rhonchi or rales.   Abdominal:      General: Bowel sounds are normal. There is no distension.      Palpations: Abdomen is soft.      Tenderness: There is no abdominal tenderness. There is no guarding.   Musculoskeletal:         General: Deformity present. No swelling.      Right lower leg: No edema.      Left lower leg: No edema.      Comments: Arthritic changes in hands   Lymphadenopathy:      Cervical: No cervical adenopathy.   Skin:     General: Skin is warm and dry.      Findings: No bruising, erythema or rash.   Neurological:       General: No focal deficit present.      Mental Status: She is alert and oriented to person, place, and time. Mental status is at baseline.      Cranial Nerves: No cranial nerve deficit.   Psychiatric:         Behavior: Behavior normal.         Thought Content: Thought content normal.         Judgment: Judgment normal.      Laboratory Reviewed: (Yes)  11/17/22: hemoglobin A1c 5.7%    11/07/22: CMP wnl eGFR > 60, CRP 0.8, lipid panel cholesterol 138 HDL 41 triglycerides 150 LDL 67, CPK 36, vitamin D 58,     [facility] Labs 5/02/22: CMP wnl x glucose 109 CBC wnl HgbA1c 5.9% TSH 2.85 chol 137  HDL 40 LDL 54  5/19/21: vit D 45    Cologuard 9/13/22: Negative recommend re-screen 3 years.    DEXA 5/12/22:  L femoral neck T-score -0.9 L1-L4 T-score 4.0 Normal w FRAX score est 10 year hip frx risk 1.8%, any osteopenic frx risk 9.7%    Mammogram 5/05/22: There are scattered fibroglandular elements that could obscure a lesion on mammography.  FINDINGS: Bilateral lumpectomies with associated architectural distortion and focal asymmetry. Benign bilateral breast calcifications. IMPRESSION: No mammographic evidence of malignancy. BI-RADS: 2 - Benign Finding.      Assessment:   72 y.o. female with multiple co-morbid illnesses here for continued follow up of medical problems.      The primary encounter diagnosis was Tachycardia. Diagnoses of Balance problem, Chronic pain of both shoulders, Myalgia, Arthralgia, unspecified joint, and ADELE positive were also pertinent to this visit.      Plan:     Problem List Items Addressed This Visit          Cardiac/Vascular    Tachycardia - Primary (Chronic)    Relevant Orders    Ambulatory referral/consult to Cardiology       Orthopedic    Myalgia (Chronic)    Relevant Orders    Ambulatory referral/consult to Rheumatology     Other Visit Diagnoses       Balance problem        Relevant Orders    Ambulatory referral/consult to Physical/Occupational Therapy    Chronic pain of both shoulders         Relevant Orders    Ambulatory referral/consult to Physical/Occupational Therapy    Ambulatory referral/consult to Pain Clinic    Arthralgia, unspecified joint        Relevant Orders    Ambulatory referral/consult to Rheumatology    ADELE positive        Relevant Orders    Ambulatory referral/consult to Rheumatology            Health Maintenance         Date Due Completion Date    Hepatitis C Screening Never done ---    Lipid Panel Never done ---    TETANUS VACCINE Never done ---    Mammogram Never done ---    DEXA Scan Never done ---    Colorectal Cancer Screening Never done ---    Shingles Vaccine (1 of 2) Never done ---    Pneumococcal Vaccines (Age 65+) (1 - PCV) Never done ---          Cologuard 9/13/22  DEXA 5/12/22   Mammogram 5/05/22    -Patient's lab results were reviewed and discussed with patient  -Treatment options and alternatives were discussed with the patient. Patient expressed understanding. Patient was given the opportunity to ask questions and be an active participant in their medical care. Patient had no further questions or concerns at this time.   -Documentation of patient's health and condition was obtained from family member who was present during visit.  -Patient is an overall moderate risk for health complications from their medical conditions.     Follow up: Follow up in about 4 weeks (around 12/16/2022) for Follow Up w me or Clarissa to recheck BP and R ear.    Care Plan/Goals: Reviewed No   Goals    None     After visit summary printed and given to patient upon discharge.  Patient goals and care plan are included in After visit summary.    TOTAL TIME evaluating and managing this patient for this encounter was greater than 60 minutes. This time was spent personally by me on the following activities: review of patient's past medical history, assessing age-appropriate health maintenance needs, review of any interval history, review and interpretation of lab results, review and interpretation of  imaging test results, review and interpretation of cardiology test results, reviewing consulting specialist notes, obtaining history from the patient and family, examination of the patient, medication reconciliation, managing and/or ordering prescription medications, ordering imaging tests, ordering referral to subspecialty provider(s), educating patient and answering their questions about diagnosis, treatment plan, and goals of treatment, discussing planned follow-up and final documentation of the visit. This time was exclusive of any separately billable procedures for this patient and exclusive of time spent treating any other patients.     Addendum  11/17/22 (resulted 11/19): ADELE 1:80 speckled  (resulted 11/22): ds DNA Ab neg 1:10 ADELE panel neg RF < 13    Has had most immunizations - [facility] - how to get updated in Health maintanence?

## 2022-11-18 NOTE — PATIENT INSTRUCTIONS
Encouraged cont stress management strategies -slow deep breathing etc.    Cont f/u w Dianna on sleep, etc.    Flonase daily for 4 weeks and consider intra-nasal saline water - can use as often as you like let us know if develop fever - increased pain in R ear, dental or sinus pain.    Consider trial CoQ10 supplement - may help w myalgias sometimes associated w statins

## 2022-11-19 ENCOUNTER — PATIENT MESSAGE (OUTPATIENT)
Dept: PRIMARY CARE CLINIC | Facility: CLINIC | Age: 72
End: 2022-11-19
Payer: MEDICARE

## 2022-11-19 NOTE — PROGRESS NOTES
Good morning Ms. Lashell - ADELE titer 1:80 speckled generally not considered significant. Can be associated with various autoimmune conditions but patients with titers of 1:80 usually have no autoimmune disease. There are further titers we can send - perhaps in concert with an e-consult from me to rheumatology - or alternatively you may want to meet with a rheumatologist yourself in person or via virtual visit.    Please let us know your preference.  Thank you,    Roselia Gillette MD, MPH  [facility]

## 2022-11-21 ENCOUNTER — TELEPHONE (OUTPATIENT)
Dept: PRIMARY CARE CLINIC | Facility: CLINIC | Age: 72
End: 2022-11-21
Payer: MEDICARE

## 2022-11-21 ENCOUNTER — DOCUMENTATION ONLY (OUTPATIENT)
Dept: PRIMARY CARE CLINIC | Facility: CLINIC | Age: 72
End: 2022-11-21
Payer: MEDICARE

## 2022-11-21 ENCOUNTER — PATIENT MESSAGE (OUTPATIENT)
Dept: PRIMARY CARE CLINIC | Facility: CLINIC | Age: 72
End: 2022-11-21
Payer: MEDICARE

## 2022-11-21 NOTE — TELEPHONE ENCOUNTER
----- Message from Roselia Gillette MD sent at 11/21/2022  4:24 PM CST -----  Regarding: Rheum referral  I've ordered referral to rheumatology for Ms. Lobo per her request: ADELE+ 1:80.  Is that soemthing we schedule or she schedules or will rheumatology dept contact her to schedule?  She's going out of town for Thanksgiving and will be back early Dec

## 2022-11-22 LAB
ANTI SM ANTIBODY: 0.08 RATIO (ref 0–0.99)
ANTI SM/RNP ANTIBODY: 0.06 RATIO (ref 0–0.99)
ANTI-SM INTERPRETATION: NEGATIVE
ANTI-SM/RNP INTERPRETATION: NEGATIVE
ANTI-SSA ANTIBODY: 0.07 RATIO (ref 0–0.99)
ANTI-SSA INTERPRETATION: NEGATIVE
ANTI-SSB ANTIBODY: 0.07 RATIO (ref 0–0.99)
ANTI-SSB INTERPRETATION: NEGATIVE
DSDNA AB SER-ACNC: NORMAL [IU]/ML

## 2022-11-28 ENCOUNTER — PATIENT MESSAGE (OUTPATIENT)
Dept: PRIMARY CARE CLINIC | Facility: CLINIC | Age: 72
End: 2022-11-28
Payer: MEDICARE

## 2022-11-29 DIAGNOSIS — R00.0 TACHYCARDIA: Primary | Chronic | ICD-10-CM

## 2022-12-01 ENCOUNTER — PATIENT MESSAGE (OUTPATIENT)
Dept: PRIMARY CARE CLINIC | Facility: CLINIC | Age: 72
End: 2022-12-01
Payer: MEDICARE

## 2022-12-01 ENCOUNTER — DOCUMENTATION ONLY (OUTPATIENT)
Dept: PRIMARY CARE CLINIC | Facility: CLINIC | Age: 72
End: 2022-12-01
Payer: MEDICARE

## 2022-12-01 NOTE — PROGRESS NOTES
Individual Psychotherapy (ANTELMO)  Alison Lobo,  12/1/2022  DATE:  12/1/2022  TYPE OF VISIT:  In person  LENGTH OF SESSION: 60    Therapeutic Intervention: Met with patient for individual psychotherapy.    Chief complaint/reason for encounter: anxiety and sleep       Session Content/Presenting Problem:  LCSW met with patient to discuss progress with her sleep habits and her anxious mood related to her move back to [place]. She has been consistently journaling about her thoughts and presents to therapy with her journal as a way to track progress and gain insight into her behaviors.     Patient reports improvement with sleepy hygiene and with quality of sleep. She recently got a CPAP and she said last night was the first night that she got quality sleep in a while. She has previously used a CPAP, but always quit using it because she couldn't get comfortable a night, but this time she was committed to making it work and she used an L shaped pillow for side sleepers to assist with getting comfortable. Today was the first morning she didn't wake up with brain fog in a long time. She also improved her sleepy hygiene habits: she has reduced her caffeine to one cup in the morning and she does not use electronics in the evenings and now reads hard cover books to wind down. She continues to talk with peers on the phone at night, but reports that this activity is not overly activating and she does not go to bed replaying those conversations in her head like she used to. Overall she reports a shift towards better quality sleep and it is easier to fall asleep. LCSW asked her to track her mood on a 0-10 scale for the next couple weeks to see if improved quality of sleep has an impact on her overall mood in the morning.     She continues to struggle with her mood in relation to her move back to [place]. The transition has been stressful for her and she reports that she often finds herself being negative about being back  "in [place]. She would like to shift towards being more positive about her decision to move back to [place] and she would like to feel "comfortable" in [place]. LCSW explored what "being comfortable" would mean and encouraged her to journal about what would make her feel "relevant" since this is one of her goals of therapy. She would like to get involved in [facility] [employer]'s sociology program since this is what her degree was in and work always made her feel productive and relevant. LCSW encouraged her to look for experience and friendships that encourage a sense of belonging.     Current symptoms:  Depression: dysphoric mood and hopelessness.  Anxiety: excessive worrying and restlessness.  Insomnia: difficulty falling asleep and non-restful sleep.  Heidi:  denies.  Psychosis: denies .    Risk parameters:  Patient reports no suicidal ideation  Patient reports no homicidal ideation  Patient reports no self-injurious behavior  Patient reports no violent behavior    MENTAL HEALTH STATUS EXAM  General Appearance:  unremarkable, age appropriate   Speech: normal tone, normal rate, normal pitch, normal volume      Level of Cooperation: cooperative      Thought Processes: normal and logical   Mood: steady      Thought Content: normal, no suicidality, no homicidality, delusions, or paranoia   Affect: congruent and appropriate   Orientation: Oriented x3   Attention Span & Concentration: intact   Fund of General Knowledge: intact and appropriate to age and level of education   Judgment & Insight: good     Language  intact       STRENGTHS AND LIABILITIES: Strength: Patient accepts guidance/feedback, Strength: Patient is expressive/articulate., Strength: Patient is intelligent., Strength: Patient is motivated for change.    IMPRESSION:   My diagnostic impression is Stress reaction emotional and Anxiety disorders; anxiety, unspecified [F41.9]  TREATMENT GOALS (per patient):    Treatment plan:  Target symptoms: anxiety , " adjustment  Why chosen therapy is appropriate versus another modality: relevant to diagnosis  Outcome monitoring methods: self-report, observation  Therapeutic intervention type: behavior modifying psychotherapy    Patient's response to intervention:  The patient's response to intervention is accepting.    Progress toward goals and other mental status changes:  The patient's progress toward goals is excellent.    Plan: Pt plans to continue individual psychotherapy CBT will be utilized in future individual therapy sessions to increase behavior modification.     Return to clinic: 2 weeks 12/12 9:45am

## 2022-12-05 ENCOUNTER — HOSPITAL ENCOUNTER (OUTPATIENT)
Dept: CARDIOLOGY | Facility: HOSPITAL | Age: 72
Discharge: HOME | End: 2022-12-05
Attending: INTERNAL MEDICINE
Payer: MEDICARE

## 2022-12-05 ENCOUNTER — CLINICAL SUPPORT (OUTPATIENT)
Dept: REHABILITATION | Facility: HOSPITAL | Age: 72
End: 2022-12-05
Attending: INTERNAL MEDICINE
Payer: MEDICARE

## 2022-12-05 ENCOUNTER — OFFICE VISIT (OUTPATIENT)
Dept: CARDIOLOGY | Facility: CLINIC | Age: 72
End: 2022-12-05
Payer: MEDICARE

## 2022-12-05 VITALS
RESPIRATION RATE: 16 BRPM | HEART RATE: 105 BPM | OXYGEN SATURATION: 97 % | SYSTOLIC BLOOD PRESSURE: 118 MMHG | WEIGHT: 193.56 LBS | BODY MASS INDEX: 34.3 KG/M2 | HEIGHT: 63 IN | DIASTOLIC BLOOD PRESSURE: 75 MMHG

## 2022-12-05 DIAGNOSIS — M25.511 CHRONIC PAIN OF BOTH SHOULDERS: ICD-10-CM

## 2022-12-05 DIAGNOSIS — R00.0 TACHYCARDIA: Chronic | ICD-10-CM

## 2022-12-05 DIAGNOSIS — M25.512 CHRONIC PAIN OF BOTH SHOULDERS: ICD-10-CM

## 2022-12-05 DIAGNOSIS — R73.03 PREDIABETES: Chronic | ICD-10-CM

## 2022-12-05 DIAGNOSIS — R26.89 BALANCE PROBLEM: ICD-10-CM

## 2022-12-05 DIAGNOSIS — E66.09 CLASS 1 OBESITY DUE TO EXCESS CALORIES WITH SERIOUS COMORBIDITY AND BODY MASS INDEX (BMI) OF 34.0 TO 34.9 IN ADULT: Primary | ICD-10-CM

## 2022-12-05 DIAGNOSIS — G89.29 CHRONIC PAIN OF BOTH SHOULDERS: ICD-10-CM

## 2022-12-05 PROCEDURE — 93010 ELECTROCARDIOGRAM REPORT: CPT | Mod: ,,, | Performed by: INTERNAL MEDICINE

## 2022-12-05 PROCEDURE — 99205 PR OFFICE/OUTPT VISIT, NEW, LEVL V, 60-74 MIN: ICD-10-PCS | Mod: S$PBB,,, | Performed by: INTERNAL MEDICINE

## 2022-12-05 PROCEDURE — 99999 PR PBB SHADOW E&M-EST. PATIENT-LVL IV: CPT | Mod: PBBFAC,,, | Performed by: INTERNAL MEDICINE

## 2022-12-05 PROCEDURE — 99205 OFFICE O/P NEW HI 60 MIN: CPT | Mod: S$PBB,,, | Performed by: INTERNAL MEDICINE

## 2022-12-05 PROCEDURE — 97162 PT EVAL MOD COMPLEX 30 MIN: CPT | Mod: PN

## 2022-12-05 PROCEDURE — 99214 OFFICE O/P EST MOD 30 MIN: CPT | Mod: PBBFAC | Performed by: INTERNAL MEDICINE

## 2022-12-05 PROCEDURE — 93005 ELECTROCARDIOGRAM TRACING: CPT

## 2022-12-05 PROCEDURE — 93010 EKG 12-LEAD: ICD-10-PCS | Mod: ,,, | Performed by: INTERNAL MEDICINE

## 2022-12-05 PROCEDURE — 99999 PR PBB SHADOW E&M-EST. PATIENT-LVL IV: ICD-10-PCS | Mod: PBBFAC,,, | Performed by: INTERNAL MEDICINE

## 2022-12-05 RX ORDER — METOPROLOL SUCCINATE 50 MG/1
50 TABLET, EXTENDED RELEASE ORAL DAILY
Qty: 30 TABLET | Refills: 11 | Status: SHIPPED | OUTPATIENT
Start: 2022-12-05 | End: 2023-08-29

## 2022-12-05 RX ORDER — ORAL SEMAGLUTIDE 7 MG/1
7 TABLET ORAL DAILY
Qty: 30 TABLET | Refills: 5 | Status: SHIPPED | OUTPATIENT
Start: 2022-12-05 | End: 2022-12-08 | Stop reason: SDUPTHER

## 2022-12-05 NOTE — PLAN OF CARE
[facility]   Physical Therapy Initial Evaluation   Date: 12/5/2022   Name: Alison Lobo  Clinic Number: [id number]    Therapy Diagnosis:   Encounter Diagnoses   Name Primary?    Balance problem     Chronic pain of both shoulders       Physician: Roselia Gillette MD     Physician Orders: PT Eval and Treat  Medical Diagnosis from Referral: R26.89 (ICD-10-CM) - Balance problem M25.511,G89.29,M25.512 (ICD-10-CM) - Chronic pain of both shoulders   Evaluation Date: 12/5/2022  Authorization Period Expiration: 11/18/23  Plan of Care Expiration: 1/6/23  Progress Note Due: 1/5/23  Visit # / Visits authorized: 1/1   FOTO: 0/3 - not available     Precautions: Standard    Time In: 1230  Time Out: 1330  Total Billable Time (timed & untimed codes): 60 minutes    SUBJECTIVE   Date of onset: She reports having joint pain for 1 year and she has started to have issues with her balance as a result    History of current condition - Chacha reports she that 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  She has the most issue with her shoulders and knees which contribute to balance deficits.     Imaging: [x] Xray [] MRI [] CT: Performed on: on feet about 6 months ago, results not available    Pain:  Current 5/10, worst 8/10, best 0/10   Location: [x] Right   [x] Left:  bilateral shoulders and bilateral feet   Description: Aching  Aggravating Factors: shoulders aggravated in bed and feet hurt when performing sit to stands and stairs  Easing Factors: lying down, taking medicine to assist    Prior Therapy:   [] N/A    [x] Yes: many years ago  Social History: Pt lives alone  Occupation: Pt is retired  Prior Level of Function: independent with all activities and loved to cross country ski and perform outdoor exercise like hiking  Current Level of Function: has had difficulty going up and down stairs and curbs, needing more assistance for balance.   Dominant Extremity:    [] Right    [x] Left    Pts goals:  to prevent  further falls and improve steadiness with gait.    Medical History:   Past Medical History:   Diagnosis Date    Cancer     Hyperlipidemia        Surgical History:   Alison Lobo  has a past surgical history that includes  section; Hysterectomy; and Breast surgery.    Medications:   Alison has a current medication list which includes the following prescription(s): atorvastatin, cetirizine, fluticasone propionate, levothyroxine, metformin, metoprolol succinate, montelukast, rybelsus, tamoxifen, trazodone, venlafaxine, and vitamin d.    Allergies:   Review of patient's allergies indicates:  No Known Allergies     OBJECTIVE     Posture:  Pt presents with postural abnormalities which include:    [x] Forward Head   [] Increased Lumbar Lordosis   [x] Rounded Shoulder   [] Genu Recurvatum   [] Increased Thoracic Kyphosis [] Genu Valgus   [] Trunk Deviated    [] Pes Planus   [] Scapular Winging    [] Other:     Flexibility:  Hamstrings: Within Normal Limits   Heel cords: Within Normal Limits       AROM:  Motion: Movement Results:   Cervical Flexion  one finger from chest   Cervical Extension 80%   Cervical Rotation 75% more pain on the left side   Upper Extremity IR reach (Medial Rotation) L3 to L5 worse on Left    Upper Extremity ER reach (External Rotation) C/T JUNCTION bilateral    Multi-Segmental Flexion mid shin   Multi-Segmental Extension 50%   Multi-Segmental Rotation 50%   Arms Down Deep Squat NT   Lateral trunk lean - Harder on Right for side bending, middle finger reaching just above knee, Left side at knee  Strength:     Right LE Left LE   Hip flexion 4/5 4/5   Hip extension 3+/5 3+/5   Hip abduction 4+/5 4+/5   Hip adduction 4+/5 4+/5   Knee flexion 4+/5 4+/5   Knee extension 5/5 5/5   Ankle dorsiflexion 4+/5 4+/5   Ankle plantarflexion 5/5 5/5       Shoulder AROM/PROM Right Left Pain/Dysfunction with Movement   Shoulder Flexion (180º) 150 160 Pain in Right > Left    Shoulder Abduction (180º) 145 160  Pain in Right > Left    Shoulder Extension (60º) WNL WNL       Sensation:  [x] Intact to Light Touch   [] Impaired:    Gait Analysis: The patient ambulated with the following assistive device: none; the pt presents with the following gait abnormalities: decreased step length bilateral    Fall risk assessment:    Performance Measurements Pt score Norms   Meehan Balance Scale Meehan Balance Scale    1. SITTING TO STANDIN - able to stand without using hands and stabilize independently    2. STANDING UNSUPPORTED:4 - able to stand safely 2 minutes without hold    3. SITTING WITH BACK UNSUPPORTED BUT FEET SUPPORTED ON FLOOR OR ON A STOOL: 4 - able to sit safely and securely 2 minutes    4. STANDING TO SITTIN - sits safely with minimal use of hands    5. TRANSFERS: 4 - able to trnasfer safely with minor use of hands    6. STANDING UNSUPPORTED WITH EYES CLOSED: 4 - able to stand 10 seconds safely    7. STANDING UNSUPPORTED WITH FEET TOGETHER: 4 - able to place feet together independently and stand 1 minute safely    8. REACHING FORWARD WITH OUTSTRETCHED ARM WHILE STANDIN - can reach forward confidently 25 cm/10 inches    9.  OBJECT FROM THE FLOOR FROM A STANDING POSITION:4 - able to  slipper safely and easily    10. TURNING TO LOOK BEHIND OVER LEFT AND RIGHT SHOULDERS WHILE STANDIN - looks behind from both sides and weights shifts well    11. TURN 360 DEGREES: 4 - able to turn 360 in seconds or less    12. PLACE ALTERNATE FOOT ON STEP OR STOOL WHILE STANDING UNSUPPORTED: 4 - able to stand independently safely and complete 8 steps in 20 seconds     13. STANDING UNSUPPORTED ONE FOOT IN FRONT: 4 - able to tandem stand independently and hold 30 seconds    14. STANDING ON ONE LE - tries to lift leg and unable to hold 3 seconds but remains standing independently      TOTAL SCORE: 53/56       51-56 = low fall risk  41-50 = increased fall risk  0 -40 = high fall risk   Single leg stance Right: 8  seconds ; Left 2 seconds- dominant leg  Less than 4.9 sec high risk  5-6.4 sec increased risk  6.5 or greater low risk   5x sit to stand 5.5 seconds Greater than 14 sec high risk  12.1-14 sec increased risk  12 sec or less low risk   Self paced walking speed Distance 25 ft (7.62 m),   Not Tested Less than 0.5 m/s high risk  Less than 1 m/s increased risk  Greater than 1 m/s low risk   Timed up and go 7.63 seconds Greater than 14 sec high risk  11.1-14 sec increased risk  11 sec or less low risk         Self reported measurements  Outcome Norms   FES-I NT/64 41-64 high risk  25-40 increased risk  16-24 low risk       Function:     CMS Impairment/Limitation/Restriction for FOTO  Survey -  Not available     Therapist reviewed FOTO scores for Chacha on 12/5/2022.   FOTO documents entered into [employer] - see Media section.    Limitation Score: %         TREATMENT     Total Treatment time (time-based codes) separate from Evaluation: (0) minutes     PATIENT EDUCATION AND HOME EXERCISES     Education provided: Pt was educated on Plan of Care to continue PT services at [facility] [place] location, then to transition back to the [facility] location at discharge.       Written Home Exercises Provided: madi Burnham demonstrated good  understanding of the education provided. See EMR under Patient Instructions for future exercises provided during therapy sessions.    ASSESSMENT     Alison is a 72 y.o. female referred to outpatient Physical Therapy with a medical diagnosis of R26.89 (ICD-10-CM) - Balance problem M25.511,G89.29,M25.512 (ICD-10-CM) - Chronic pain of both shoulders . Pt presents with impairments including: decreased ROM, decreased strength, decreased joint mobility, impaired coordination, impaired balance, postural abnormalities, and gait abnormalities.  She has been limited by pain up to 8/10 for about 1 year in bilateral feet and bilateral shoulders that limit her mobility, balance and overall function with her daily  responsibilities.  She would benefit from continued PT to address these deficits and return to her prior level of function.  At discharge, she would like to transition to an exercise program with the health  at the 65[facility] wellness program.      Pt prognosis is Good.   Pt will benefit from skilled outpatient Physical Therapy to address the deficits stated above and in the chart below, provide pt/family education, and to maximize pt's level of independence.     Plan of care discussed with patient: Yes  Pt's spiritual, cultural and educational needs considered and patient is agreeable to the plan of care and goals as stated below:     Anticipated Barriers for therapy: co-morbidities and chronicity of condition    Medical Necessity is demonstrated by the following  History  Co-morbidities and personal factors that may impact the plan of care Co-morbidities:   Cancer   Hyperlipidemia       Personal Factors:   coping style  lifestyle     moderate   Examination  Body Structures and Functions, activity limitations and participation restrictions that may impact the plan of care Body Regions:   lower extremities  upper extremities  trunk    Body Systems:    ROM  strength  gross coordinated movement  balance  gait  motor control  heart rate  blood pressure    Participation Restrictions:   none    Activity limitations:   Learning and applying knowledge  no deficits    General Tasks and Commands  no deficits    Communication  no deficits    Mobility  lifting and carrying objects  walking    Self care  no deficits    Domestic Life  shopping  doing house work (cleaning house, washing dishes, laundry)    Interactions/Relationships  family relationships    Life Areas  no deficits    Community and Social Life  community life  recreation and leisure  Shinto and spirituality         high   Clinical Presentation evolving clinical presentation with changing clinical characteristics moderate   Decision Making/ Complexity Score:  moderate           Short Term Goals:  4 weeks Status  Date Met   Pt to be educated on Home Exercise Program. [x] Progressing  [] Met  [] Not Met    Pt will improve shoulder AROM by 10 degrees or more in all classical planes in order to improve function. [x] Progressing  [] Met  [] Not Met    Patient will report less that 4/10 pain at worst in order to improve QOL. [x] Progressing  [] Met  [] Not Met    Patient will demonstrate independence with HEP in order to progress toward functional independence. [x] Progressing  [] Met  [] Not Met    Patient will improve score on FOTO to demonstrate improved mobility and functional independence.      Patient will improve bilateral single leg standing to 10 seconds to demonstrate improved balance.        PLAN   Plan of care Certification: 12/5/2022 to 1/6/23.    Outpatient Physical Therapy 1-2 times/ month for 8 weeks to include the following interventions: Neuromuscular Re-ed, Patient Education, Therapeutic Activities, and Therapeutic Exercise, manual therapy, E-stim attended and modalities to address deficits and work towards improved mobility and function.     Leni Luna, PT, DPT      I CERTIFY THE NEED FOR THESE SERVICES FURNISHED UNDER THIS PLAN OF TREATMENT AND WHILE UNDER MY CARE   Physician's comments:     Physician's Signature: ___________________________________________________

## 2022-12-05 NOTE — PROGRESS NOTES
Subjective:   Patient ID:  Alison Lobo is a 72 y.o. female who presents for evaluation of Tachycardia and General Cardio      73 yo female, referred to tachycardia. Retired.  PMH chronic tachycardia, h/o breast Ca in  and 2018, lumpectomy and XRT Rx on both sides, no chemo. HLD, obesity, preDM, JADA back to CPAP, no h/o MI CVA.  Tachy for years since .   She denied chest pain, dyspnea on exertion, palpitation, fainting, PND, orthopnea, syncope and claudication.   Exercise 3 times a week at [place]. No smoking. Occasional drinking                Past Medical History:   Diagnosis Date    Cancer     Hyperlipidemia        Past Surgical History:   Procedure Laterality Date    BREAST SURGERY       SECTION      HYSTERECTOMY         Social History     Tobacco Use    Smoking status: Never    Smokeless tobacco: Never   Substance Use Topics    Drug use: Never       History reviewed. No pertinent family history.    Review of Systems   Constitutional: Negative for decreased appetite, diaphoresis, fever, malaise/fatigue and night sweats.   HENT:  Negative for nosebleeds.    Eyes:  Negative for blurred vision and double vision.   Cardiovascular:  Negative for chest pain, claudication, dyspnea on exertion, irregular heartbeat, leg swelling, near-syncope, orthopnea, palpitations, paroxysmal nocturnal dyspnea and syncope.   Respiratory:  Negative for cough, shortness of breath, sleep disturbances due to breathing, snoring, sputum production and wheezing.    Endocrine: Negative for cold intolerance and polyuria.   Hematologic/Lymphatic: Does not bruise/bleed easily.   Skin:  Negative for rash.   Musculoskeletal:  Negative for back pain, falls, joint pain, joint swelling and neck pain.   Gastrointestinal:  Negative for abdominal pain, heartburn, nausea and vomiting.   Genitourinary:  Negative for dysuria, frequency and hematuria.   Neurological:  Negative for difficulty with concentration, dizziness, focal weakness,  headaches, light-headedness, numbness, seizures and weakness.   Psychiatric/Behavioral:  Negative for depression, memory loss and substance abuse. The patient does not have insomnia.    Allergic/Immunologic: Negative for HIV exposure and hives.     Objective:   Physical Exam  HENT:      Head: Normocephalic.   Eyes:      Pupils: Pupils are equal, round, and reactive to light.   Neck:      Thyroid: No thyromegaly.      Vascular: Normal carotid pulses. No carotid bruit or JVD.   Cardiovascular:      Rate and Rhythm: Normal rate and regular rhythm. No extrasystoles are present.     Chest Wall: PMI is not displaced.      Pulses: Normal pulses.      Heart sounds: Normal heart sounds. No murmur heard.    No gallop. No S3 sounds.   Pulmonary:      Effort: No respiratory distress.      Breath sounds: Normal breath sounds. No stridor.   Abdominal:      General: Bowel sounds are normal.      Palpations: Abdomen is soft.      Tenderness: There is no abdominal tenderness. There is no rebound.   Musculoskeletal:         General: Normal range of motion.   Skin:     Findings: No rash.   Neurological:      Mental Status: She is alert and oriented to person, place, and time.   Psychiatric:         Behavior: Behavior normal.     Lab Results   Component Value Date    CHOL 138 11/07/2022     Lab Results   Component Value Date    HDL 41 11/07/2022     Lab Results   Component Value Date    LDLCALC 67.0 11/07/2022     Lab Results   Component Value Date    TRIG 150 11/07/2022     Lab Results   Component Value Date    CHOLHDL 29.7 11/07/2022       Chemistry        Component Value Date/Time     11/07/2022 1213    K 4.4 11/07/2022 1213     11/07/2022 1213    CO2 23 11/07/2022 1213    BUN 20 11/07/2022 1213    CREATININE 0.6 11/07/2022 1213    GLU 79 11/07/2022 1213        Component Value Date/Time    CALCIUM 9.8 11/07/2022 1213          Lab Results   Component Value Date    HGBA1C 5.7 (H) 11/17/2022     Lab Results   Component  Value Date    TSH 1.849 11/17/2022     No results found for: INR, PROTIME  No results found for: WBC, HGB, HCT, MCV, PLT  BNP  @LABRCNTIP(BNP,BNPTRIAGEBLO)@  CrCl cannot be calculated (Patient's most recent lab result is older than the maximum 7 days allowed.).  No results found in the last 24 hours.  No results found in the last 24 hours.  No results found in the last 24 hours.    Assessment:      1. Class 1 obesity due to excess calories with serious comorbidity and body mass index (BMI) of 34.0 to 34.9 in adult    2. Tachycardia    3. Prediabetes        Plan:   Echo  D/c verapamil   Add metoprolol 50 mg daily  Add Rybelsus 7 mg daily for obesity  Discussed the benefits and risks of GLP-1 RA    Counseled DASH  Check Lipid profile in 6 months  Recommend heart-healthy diet, weight control and regular exercise.  Naseem. Risk modification.   I have reviewed all pertinent labs and cardiac studies independently. Plans and recommendations have been formulated under my direct supervision. All questions answered and patient voiced understanding.   If symptoms persist go to the ED  RTC in 12 months

## 2022-12-09 ENCOUNTER — PATIENT MESSAGE (OUTPATIENT)
Dept: PRIMARY CARE CLINIC | Facility: CLINIC | Age: 72
End: 2022-12-09
Payer: MEDICARE

## 2022-12-09 RX ORDER — ORAL SEMAGLUTIDE 7 MG/1
7 TABLET ORAL DAILY
Qty: 30 TABLET | Refills: 5 | Status: SHIPPED | OUTPATIENT
Start: 2022-12-09 | End: 2023-03-08

## 2022-12-12 ENCOUNTER — PATIENT MESSAGE (OUTPATIENT)
Dept: CARDIOLOGY | Facility: CLINIC | Age: 72
End: 2022-12-12
Payer: MEDICARE

## 2022-12-12 ENCOUNTER — TELEPHONE (OUTPATIENT)
Dept: PRIMARY CARE CLINIC | Facility: CLINIC | Age: 72
End: 2022-12-12
Payer: MEDICARE

## 2022-12-15 ENCOUNTER — OFFICE VISIT (OUTPATIENT)
Dept: RHEUMATOLOGY | Facility: CLINIC | Age: 72
End: 2022-12-15
Payer: MEDICARE

## 2022-12-15 ENCOUNTER — HOSPITAL ENCOUNTER (OUTPATIENT)
Dept: RADIOLOGY | Facility: HOSPITAL | Age: 72
Discharge: HOME | End: 2022-12-15
Attending: STUDENT IN AN ORGANIZED HEALTH CARE EDUCATION/TRAINING PROGRAM
Payer: MEDICARE

## 2022-12-15 VITALS
HEIGHT: 63 IN | DIASTOLIC BLOOD PRESSURE: 73 MMHG | WEIGHT: 193.56 LBS | SYSTOLIC BLOOD PRESSURE: 123 MMHG | HEART RATE: 122 BPM | BODY MASS INDEX: 34.3 KG/M2

## 2022-12-15 DIAGNOSIS — M25.50 ARTHRALGIA, UNSPECIFIED JOINT: ICD-10-CM

## 2022-12-15 DIAGNOSIS — G89.29 CHRONIC PAIN OF BOTH KNEES: Primary | ICD-10-CM

## 2022-12-15 DIAGNOSIS — M79.10 MYALGIA: ICD-10-CM

## 2022-12-15 DIAGNOSIS — M25.562 CHRONIC PAIN OF BOTH KNEES: ICD-10-CM

## 2022-12-15 DIAGNOSIS — M25.561 CHRONIC PAIN OF BOTH KNEES: Primary | ICD-10-CM

## 2022-12-15 DIAGNOSIS — M25.562 CHRONIC PAIN OF BOTH KNEES: Primary | ICD-10-CM

## 2022-12-15 DIAGNOSIS — R76.8 ANA POSITIVE: ICD-10-CM

## 2022-12-15 DIAGNOSIS — G89.29 CHRONIC PAIN OF BOTH KNEES: ICD-10-CM

## 2022-12-15 DIAGNOSIS — M25.561 CHRONIC PAIN OF BOTH KNEES: ICD-10-CM

## 2022-12-15 PROCEDURE — 99204 OFFICE O/P NEW MOD 45 MIN: CPT | Mod: S$PBB,,, | Performed by: STUDENT IN AN ORGANIZED HEALTH CARE EDUCATION/TRAINING PROGRAM

## 2022-12-15 PROCEDURE — 73562 X-RAY EXAM OF KNEE 3: CPT | Mod: TC,50

## 2022-12-15 PROCEDURE — 73562 X-RAY EXAM OF KNEE 3: CPT | Mod: 26,50,, | Performed by: RADIOLOGY

## 2022-12-15 PROCEDURE — 99999 PR PBB SHADOW E&M-EST. PATIENT-LVL IV: ICD-10-PCS | Mod: PBBFAC,,, | Performed by: STUDENT IN AN ORGANIZED HEALTH CARE EDUCATION/TRAINING PROGRAM

## 2022-12-15 PROCEDURE — 73562 XR KNEE ORTHO BILAT: ICD-10-PCS | Mod: 26,50,, | Performed by: RADIOLOGY

## 2022-12-15 PROCEDURE — 99999 PR PBB SHADOW E&M-EST. PATIENT-LVL IV: CPT | Mod: PBBFAC,,, | Performed by: STUDENT IN AN ORGANIZED HEALTH CARE EDUCATION/TRAINING PROGRAM

## 2022-12-15 PROCEDURE — 99204 PR OFFICE/OUTPT VISIT, NEW, LEVL IV, 45-59 MIN: ICD-10-PCS | Mod: S$PBB,,, | Performed by: STUDENT IN AN ORGANIZED HEALTH CARE EDUCATION/TRAINING PROGRAM

## 2022-12-15 PROCEDURE — 99214 OFFICE O/P EST MOD 30 MIN: CPT | Mod: PBBFAC | Performed by: STUDENT IN AN ORGANIZED HEALTH CARE EDUCATION/TRAINING PROGRAM

## 2022-12-15 RX ORDER — MELOXICAM 7.5 MG/1
7.5 TABLET ORAL DAILY
Qty: 30 TABLET | Refills: 3 | Status: SHIPPED | OUTPATIENT
Start: 2022-12-15 | End: 2023-03-08

## 2022-12-15 NOTE — PROGRESS NOTES
RHEUMATOLOGY CLINIC INITIAL VISIT    Reason for consult:- muscle pain, joint pain and positive ADELE    Chief complaints, HPI, ROS, EXAM, Assessment & Plans:-    Alison Lobo is a 72 y.o. pleasant female who presents to be evaluated for myalgias, arthralgias as well as positive ADELE.  Patient states she moved here about a year ago and feels the warm humid weather has not been helping with her pains.  States she is started to have pains in most of her joints including her knees, hands, shoulders, hips, neck.  She has been taking Aleve which does provide a lot of relief.  She feels her pain waxes and wanes.  In general she states activity seems to make it a little bit better.  She has about a 1 hour of morning stiffness.  Denies any noticeable joint swelling.  The patient denies fevers, patchy alopecia, oral/nasal ulcers, sicca symptoms, rashes, photosensitivity, pleuritic chest pain, shortness of breath, cough, abdominal pain, diarrhea, bloody stool, weakness, numbness/tingling, and Raynaud's.No evidence of synovitis, dactylitis or enthesitis.  She has some bony enlargement of the joints of her hands.  Crepitus of knees.       Reviewed all available old and outside pertinent medical records.    All lab results personally reviewed and interpreted by me.    1. Chronic pain of both knees    2. Myalgia    3. Arthralgia, unspecified joint    4. ADELE positive        Problem List Items Addressed This Visit          Orthopedic    Myalgia (Chronic)     Other Visit Diagnoses       Chronic pain of both knees    -  Primary    Relevant Medications    meloxicam (MOBIC) 7.5 MG tablet    Other Relevant Orders    X-ray Knee Ortho Bilateral (Completed)    Arthralgia, unspecified joint        Relevant Medications    meloxicam (MOBIC) 7.5 MG tablet    Other Relevant Orders    X-ray Knee Ortho Bilateral (Completed)    Cyclic Citrullinated Peptide Antibody, IgG (Completed)    C-Reactive Protein (Completed)    Sedimentation rate (Completed)     ADELE positive        Relevant Medications    meloxicam (MOBIC) 7.5 MG tablet    Other Relevant Orders    X-ray Knee Ortho Bilateral (Completed)    Cyclic Citrullinated Peptide Antibody, IgG (Completed)    C-Reactive Protein (Completed)    Sedimentation rate (Completed)            Patient presenting to be evaluated for polyarthralgias/myalgias as well as positive ADELE  ADELE positive at 1:80 speckled pattern with negative extended profile  Her inflammatory markers were not elevated  RF negative  normal CK  normal vitamin-D  History, exam and labwork thus far do not raise suspicion for underlying rheumatologic disease  Her pain seems more consistent with osteoarthritis  will check CCP and sed rate  Will obtain x-rays of knees  Trial low-dose meloxicam 7.5 mg daily  Encouraged to try to lose weight to slow progression of osteoarthritis  Recommended Mediterranean diet  Encouraged to try turmeric supplements    # Follow up in about 6 months (around 6/15/2023).    Chronic comorbid conditions affecting medical decision making today:    Past Medical History:   Diagnosis Date    Cancer     Hyperlipidemia        Past Surgical History:   Procedure Laterality Date    BREAST SURGERY       SECTION      HYSTERECTOMY          Social History     Tobacco Use    Smoking status: Never    Smokeless tobacco: Never   Substance Use Topics    Drug use: Never       History reviewed. No pertinent family history.    Review of patient's allergies indicates:  No Known Allergies    Medication List with Changes/Refills   New Medications    MELOXICAM (MOBIC) 7.5 MG TABLET    Take 1 tablet (7.5 mg total) by mouth once daily.   Current Medications    ATORVASTATIN (LIPITOR) 20 MG TABLET    Take 20 mg by mouth once daily.    CETIRIZINE (ZYRTEC) 10 MG TABLET    Take 10 mg by mouth once daily.    FLUTICASONE PROPIONATE (FLONASE) 50 MCG/ACTUATION NASAL SPRAY    1 spray (50 mcg total) by Each Nostril route once daily. Use daily for 28 days then  as needed    LEVOTHYROXINE (SYNTHROID) 25 MCG TABLET    Take 1 tablet (25 mcg total) by mouth before breakfast.    METFORMIN (GLUCOPHAGE) 500 MG TABLET    Take 1,000 mg by mouth once daily. Takes 2.5 500mg QD    METOPROLOL SUCCINATE (TOPROL-XL) 50 MG 24 HR TABLET    Take 1 tablet (50 mg total) by mouth once daily.    MONTELUKAST (SINGULAIR) 10 MG TABLET    Take 10 mg by mouth every evening.    SEMAGLUTIDE (RYBELSUS) 7 MG TABLET    Take 1 tablet (7 mg total) by mouth once daily.    TAMOXIFEN (NOLVADEX) 20 MG TAB    Take 20 mg by mouth once daily.    TRAZODONE (DESYREL) 50 MG TABLET    Take 50 mg by mouth every evening.    VENLAFAXINE (EFFEXOR-XR) 150 MG CP24    Take 150 mg by mouth once daily. Takes 2 pills QD    VITAMIN D (VITAMIN D3) 1000 UNITS TAB    once daily.         Disclaimer: This note was prepared using voice recognition system and is likely to have sound alike errors and is not proofread.  Please message me with any questions.    45 minutes of total time spent on the encounter, which includes face to face time and non-face to face time preparing to see the patient (eg, review of tests), Obtaining and/or reviewing separately obtained history, Documenting clinical information in the electronic or other health record, Independently interpreting results (not separately reported) and communicating results to the patient/family/caregiver, or Care coordination (not separately reported).     Thank you for allowing me to participate in the care of Alison Lashell.    Javi Bess MD

## 2022-12-17 ENCOUNTER — PATIENT MESSAGE (OUTPATIENT)
Dept: PRIMARY CARE CLINIC | Facility: CLINIC | Age: 72
End: 2022-12-17
Payer: MEDICARE

## 2022-12-19 ENCOUNTER — OFFICE VISIT (OUTPATIENT)
Dept: PRIMARY CARE CLINIC | Facility: CLINIC | Age: 72
End: 2022-12-19
Payer: MEDICARE

## 2022-12-19 ENCOUNTER — PATIENT OUTREACH (OUTPATIENT)
Dept: ADMINISTRATIVE | Facility: HOSPITAL | Age: 72
End: 2022-12-19
Payer: MEDICARE

## 2022-12-19 VITALS
DIASTOLIC BLOOD PRESSURE: 67 MMHG | BODY MASS INDEX: 34.29 KG/M2 | TEMPERATURE: 98 F | HEART RATE: 98 BPM | SYSTOLIC BLOOD PRESSURE: 136 MMHG | OXYGEN SATURATION: 98 % | HEIGHT: 63 IN

## 2022-12-19 DIAGNOSIS — F32.5 MAJOR DEPRESSIVE DISORDER IN REMISSION, UNSPECIFIED WHETHER RECURRENT: Primary | Chronic | ICD-10-CM

## 2022-12-19 DIAGNOSIS — F41.1 GAD (GENERALIZED ANXIETY DISORDER): Chronic | ICD-10-CM

## 2022-12-19 DIAGNOSIS — Z17.0 MALIGNANT NEOPLASM OF LEFT BREAST IN FEMALE, ESTROGEN RECEPTOR POSITIVE, UNSPECIFIED SITE OF BREAST: ICD-10-CM

## 2022-12-19 DIAGNOSIS — C50.912 MALIGNANT NEOPLASM OF LEFT BREAST IN FEMALE, ESTROGEN RECEPTOR POSITIVE, UNSPECIFIED SITE OF BREAST: ICD-10-CM

## 2022-12-19 DIAGNOSIS — M15.9 PRIMARY OSTEOARTHRITIS INVOLVING MULTIPLE JOINTS: Chronic | ICD-10-CM

## 2022-12-19 DIAGNOSIS — E66.09 CLASS 1 OBESITY DUE TO EXCESS CALORIES WITH SERIOUS COMORBIDITY AND BODY MASS INDEX (BMI) OF 34.0 TO 34.9 IN ADULT: Chronic | ICD-10-CM

## 2022-12-19 DIAGNOSIS — E03.9 ACQUIRED HYPOTHYROIDISM: Chronic | ICD-10-CM

## 2022-12-19 DIAGNOSIS — H65.91 RECURRENT SEROUS OTITIS MEDIA OF RIGHT EAR: ICD-10-CM

## 2022-12-19 DIAGNOSIS — R73.03 PREDIABETES: Chronic | ICD-10-CM

## 2022-12-19 PROCEDURE — 99213 OFFICE O/P EST LOW 20 MIN: CPT | Mod: PBBFAC,PN | Performed by: INTERNAL MEDICINE

## 2022-12-19 PROCEDURE — 99999 PR PBB SHADOW E&M-EST. PATIENT-LVL III: ICD-10-PCS | Mod: PBBFAC,,, | Performed by: INTERNAL MEDICINE

## 2022-12-19 PROCEDURE — 99999 PR PBB SHADOW E&M-EST. PATIENT-LVL III: CPT | Mod: PBBFAC,,, | Performed by: INTERNAL MEDICINE

## 2022-12-19 PROCEDURE — 99215 PR OFFICE/OUTPT VISIT, EST, LEVL V, 40-54 MIN: ICD-10-PCS | Mod: S$PBB,,, | Performed by: INTERNAL MEDICINE

## 2022-12-19 PROCEDURE — 99215 OFFICE O/P EST HI 40 MIN: CPT | Mod: S$PBB,,, | Performed by: INTERNAL MEDICINE

## 2022-12-19 NOTE — PATIENT INSTRUCTIONS
Recommend cont intranasal steriods and try other maneuvers to open estuchian tubes    Please forward HCV and Cologuard report to us if able    Contact PT about plan to defer until [facility]

## 2022-12-19 NOTE — PROGRESS NOTES
Alison Lobo  12/19/2022  [id number]    Roselia Gillette MD  Patient Care Team:  Roselia Gillette MD as PCP - General (Internal Medicine)  Clarissa Stephenson NP as Nurse Practitioner (Family Medicine)    Visit Type:a scheduled routine follow-up visit    Chief Complaint:  Chief Complaint   Patient presents with    Follow-up     History of Present Illness: Ms. Alison Lobo is a 72 year old female here for f/u. Medical issues include prediabetes, B Chun's neuroma/foot pain, JADA (CPAP), obesity, h/o R breast cancer 2002, L breast cancer 2018 (both small invasive ductal carcinoma), recurrent MDD, HLD, hypothyroid.    S/p hysterectomy and s/p lumpectomies and completed Tx for 2002 R breast cancer. Will be taking tamoxifen for L breast cancer at least through 2023.     Had established care w Gen surg Dr. Nathan at [facility] with whom plan was to f/u Q 6 mos for monitoring of her breast cancer (however may need to establish w a different Breast Oncologist?). Also established w Psychiatrist Dr. Yates at [facility], in addition to Podiatrist, Pulmonologist and Ophthalmologist.    Enjoyed her time away w friends over Thanksgiving.     Repeat sleep study completed. Doing better w CPAP.    Plan to start PT after holidays. Foot feeling better.   Rheum advised tumeric w black pepper and prescribed mobic.  Has found sessions w O65+ Dianna RODRIGES, to be helpful.    HR improved w switch from verapamil to metoprolol.      Hosp/ED/Urgent Care:  8/23/22 [facility]     Recent appointments:   12/15/22 Rheum Dr. Bess +ADELE, OA f/u 6 mos  12/05/22 Card Dr. Reyes d/c Verapamil start Metoprolol, Rybelsus f/u 1 year  12/01/22 O65+ Dianna Mcnulty LCSW (missed 12/12 appt)  11/18/22 O65+ Dr. Gillette   11/14/22 O65+ Dianna Mcnulty LCSW  11/07/22 O65+ Dianna Mcnulty LCSW intake  10/07/22 O65+ Dr. Gillette establish care     Recent L shoulder injection?  Sleep study (w CPAP titration and mask fitting).     Upcoming appointments:  12/23/22  Psychiatry Dr. Yates 3 mos f/u   Future Appointments       Date Provider Specialty Appt Notes    2/6/2023 Clarissa Stephenson NP Primary Care AWV    3/20/2023 Roselia Gillette MD Primary Care 3 month f/u    6/14/2023 Javi Bess MD Rheumatology [email or web address]    12/8/2023 Osmani Reyes MD Cardiology 1 year f/u           The following were reviewed: Active problem list, medication list, allergies, family history, social history, and Health Maintenance.     History:  Past Medical History:   Diagnosis Date    Cancer     Hyperlipidemia      Patient Active Problem List   Diagnosis    Hyperlipidemia    Malignant neoplasm of left breast in female, estrogen receptor positive    History of right breast cancer    Class 1 obesity due to excess calories with serious comorbidity and body mass index (BMI) of 34.0 to 34.9 in adult    Acquired hypothyroidism    JADA on CPAP    Perineal irritation in female    Major depressive disorder in remission    MEHNAZ (generalized anxiety disorder)    Other insomnia    Prediabetes    Chun's neuroma of both feet    Primary osteoarthritis involving multiple joints    Myalgia    Allergies    Recurrent serous otitis media of right ear     Review of patient's allergies indicates:  No Known Allergies    Medications:  Current Outpatient Medications on File Prior to Visit   Medication Sig Dispense Refill    atorvastatin (LIPITOR) 20 MG tablet Take 20 mg by mouth once daily.      cetirizine (ZYRTEC) 10 MG tablet Take 10 mg by mouth once daily.      levothyroxine (SYNTHROID) 25 MCG tablet Take 1 tablet (25 mcg total) by mouth before breakfast. 30 tablet 5    meloxicam (MOBIC) 7.5 MG tablet Take 1 tablet (7.5 mg total) by mouth once daily. 30 tablet 3    metFORMIN (GLUCOPHAGE) 500 MG tablet Take 1,000 mg by mouth once daily. Takes 2.5 500mg QD      metoprolol succinate (TOPROL-XL) 50 MG 24 hr tablet Take 1 tablet (50 mg total) by mouth once daily. 30 tablet 11    montelukast (SINGULAIR) 10 mg tablet Take  10 mg by mouth every evening.      semaglutide (RYBELSUS) 7 mg tablet Take 1 tablet (7 mg total) by mouth once daily. 30 tablet 5    tamoxifen (NOLVADEX) 20 MG Tab Take 20 mg by mouth once daily.      traZODone (DESYREL) 50 MG tablet Take 50 mg by mouth every evening.      venlafaxine (EFFEXOR-XR) 150 MG Cp24 Take 150 mg by mouth once daily. Takes 2 pills QD      vitamin D (VITAMIN D3) 1000 units Tab once daily.       No current facility-administered medications on file prior to visit.       Medications have been reviewed and reconciled with patient at visit today.    Barriers to medications present (no )    Adverse reactions to current medications (no)    Over the counter medications reviewed (Yes) and if needed added to active Medication list.    Review of Systems   Constitutional:  Negative for unexpected weight change.   HENT:  Positive for ear pain. Negative for mouth dryness, sore throat and trouble swallowing.         Intermittent R ear pain   Eyes:  Negative for photophobia and pain.   Respiratory:  Negative for cough, shortness of breath and wheezing.    Cardiovascular:  Negative for chest pain and palpitations.   Gastrointestinal:  Negative for abdominal pain, change in bowel habit and change in bowel habit.   Musculoskeletal:  Positive for arthralgias, gait problem and myalgias.        Foot pain affects gait, mobility but ambulates independently w/o AD   Integumentary:  Negative for rash.   Allergic/Immunologic: Positive for environmental allergies.   Psychiatric/Behavioral:  Positive for sleep disturbance.       Exam:  Vitals:    12/19/22 1549   BP: 136/67   Pulse: 98   Temp: 98.4 °F (36.9 °C)         Body mass index is 34.29 kg/m².    Physical Exam  Vitals reviewed.   Constitutional:       General: She is not in acute distress.     Appearance: Normal appearance. She is obese. She is not ill-appearing.   HENT:      Head: Normocephalic and atraumatic.      Right Ear: Ear canal and external ear normal.       Left Ear: Tympanic membrane, ear canal and external ear normal.      Ears:      Comments: Still w R serous effusion     Nose: Nose normal.   Eyes:      General:         Right eye: No discharge.         Left eye: No discharge.      Extraocular Movements: Extraocular movements intact.      Conjunctiva/sclera: Conjunctivae normal.   Cardiovascular:      Rate and Rhythm: Normal rate.   Pulmonary:      Effort: Pulmonary effort is normal.   Musculoskeletal:         General: Deformity present.      Comments: Arthritic changes in fingers/hands   Skin:     Findings: No bruising or rash.   Neurological:      General: No focal deficit present.      Mental Status: She is alert. Mental status is at baseline.   Psychiatric:         Behavior: Behavior normal.         Thought Content: Thought content normal.         Judgment: Judgment normal.      Laboratory Reviewed: (Yes)  Lab Results   Component Value Date    CHOL 138 11/07/2022    TRIG 150 11/07/2022    HDL 41 11/07/2022    LDLCALC 67.0 11/07/2022     11/07/2022    K 4.4 11/07/2022     11/07/2022    CREATININE 0.6 11/07/2022    BUN 20 11/07/2022    CO2 23 11/07/2022    MG 1.9 11/07/2022    TSH 1.849 11/17/2022    HGBA1C 5.7 (H) 11/17/2022    CRP 1.1 12/15/2022     Labs 12/15/22: ESR 11 CCP < 0.5 wnl  10/07/22: vit D 58    [facility] Labs 5/02/22: CMP wnl x glucose 109 CBC wnl HgbA1c 5.9% TSH 2.85 chol 137  HDL 40 LDL 54  5/19/21: vit D 45    X-ray B knees 12/15/22: There is mild bilateral tricompartmental osteoarthritis.    ECG 12/05/22: Vent. Rate : 107 BPM     Atrial Rate : 107 BPM   P-R Int : 142 ms          QRS Dur : 074 ms   QT Int : 326 ms       P-R-T Axes : 052 -03 046 degrees   QTc Int : 435 ms   Sinus tachycardia. Cannot rule out Anterior infarct, age undetermined    Assessment:   72 y.o. female with multiple co-morbid illnesses here for continued follow up of medical problems.      The primary encounter diagnosis was Major depressive disorder in remission,  unspecified whether recurrent. Diagnoses of MEHNAZ (generalized anxiety disorder), Malignant neoplasm of left breast in female, estrogen receptor positive, unspecified site of breast, Acquired hypothyroidism, Prediabetes, Class 1 obesity due to excess calories with serious comorbidity and body mass index (BMI) of 34.0 to 34.9 in adult, Recurrent serous otitis media of right ear, and Primary osteoarthritis involving multiple joints were also pertinent to this visit.      Plan:     Problem List Items Addressed This Visit          Psychiatric    Major depressive disorder in remission - Primary (Chronic)     Cont current Rx and f/u w Psychiatry -  - also encourage cont  F/u w O65+ LCSW as appropriate and work on increasing daily physical activity/movement and other behavioral/somatic interventions           MEHNAZ (generalized anxiety disorder) (Chronic)     Cont current Rx and f/u w Psychiatry - also encourage cont  F/u w O65+ LCSW as appropriate and work on increasing daily physical activity/movement and other behavioral/somatic interventions            ENT    Recurrent serous otitis media of right ear     Cont daily flonase x 4 weeks to see if helps to drain             Oncology    Malignant neoplasm of left breast in female, estrogen receptor positive     S/p lumpectomy 2018 - cont f/u w Gen Surg q 6 mos - cont surveillance mammograms - cont tamoxifen (may need to connect w new breast surgeon - consider Dr. Araujo? Dr. Santos?)            Endocrine    Class 1 obesity due to excess calories with serious comorbidity and body mass index (BMI) of 34.0 to 34.9 in adult (Chronic)     Cont metformin - cont healthier food and beverage choices - encourage increase movement, daily physical activity.  Rybelsus added by Cardiology, Dr. Ireland, but unclear if insurance will cover w/o diagnosis of Type 2 DM.          Acquired hypothyroidism (Chronic)     TSH wnl cont current Rx         Prediabetes (Chronic)     Cont metformin - cont  healthier food and beverage choices - encourage increase movement, daily physical activity.  Rybelsus added by Cardiology, Dr. Ireland, but unclear if insurance will cover w/o diagnosis of Type 2 DM.  - recheck HgbA1c prior to March appointment?            Orthopedic    Primary osteoarthritis involving multiple joints (Chronic)     Encourage to stay active and mobile to extent able - f/u w PT in Jan - meloxicam, tumeric added/recommended by Rheum            Health Maintenance         Date Due Completion Date    Hepatitis C Screening Never done ---    Colorectal Cancer Screening Never done ---    Shingles Vaccine (1 of 2) 10/27/2009 9/1/2009    Mammogram 09/10/2020 9/10/2019    Hemoglobin A1c (Prediabetes) 11/17/2023 11/17/2022    DEXA Scan 05/12/2025 5/12/2022    Lipid Panel 11/07/2027 11/7/2022    TETANUS VACCINE 05/09/2029 5/9/2019             Cologuard 9/13/22: Negative recommend re-screen 3 years.     DEXA 5/12/22:  L femoral neck T-score -0.9 L1-L4 T-score 4.0 Normal w FRAX score est 10 year hip frx risk 1.8%, any osteopenic frx risk 9.7%     Mammogram 5/05/22: There are scattered fibroglandular elements that could obscure a lesion on mammography.  FINDINGS: Bilateral lumpectomies with associated architectural distortion and focal asymmetry. Benign bilateral breast calcifications. IMPRESSION: No mammographic evidence of malignancy. BI-RADS: 2 - Benign Finding.      -Patient's lab results were reviewed and discussed with patient  -Treatment options and alternatives were discussed with the patient. Patient expressed understanding. Patient was given the opportunity to ask questions and be an active participant in their medical care. Patient had no further questions or concerns at this time.   -Patient is an overall moderate risk for health complications from their medical conditions.     Follow up: Follow up in about 3 months (around 3/19/2023) for Follow Up Dr. Nain mojica labwork prior, Follow Up.    After visit  summary printed and given to patient upon discharge.  Patient care plan included in After visit summary.    TOTAL TIME evaluating and managing this patient for this encounter was greater than 50 minutes. This time was spent personally by me on some of the following activities: review of patient's past medical history, assessing age-appropriate health maintenance needs, review of any interval history, review and interpretation of lab results, review and interpretation of imaging test results, review and interpretation of cardiology test results, reviewing consulting specialist notes, obtaining history from the patient and family, examination of the patient, medication reconciliation, managing and/or ordering prescription medications, ordering imaging tests, ordering referral to subspecialty provider(s), educating patient and answering their questions about diagnosis, treatment plan, and goals of treatment, discussing planned follow-up and final documentation of the visit. This time was exclusive of any separately billable procedures for this patient and exclusive of time spent treating any other patients.     Addendum  Echocardiogram 12/21/22: The left ventricle is normal in size with concentric remodeling and normal systolic function. The estimated ejection fraction is 60%. Normal left ventricular diastolic function. Normal right ventricular size with normal right ventricular systolic function. Normal central venous pressure (3 mmHg). The estimated PA systolic pressure is 21 mmHg.

## 2022-12-21 ENCOUNTER — HOSPITAL ENCOUNTER (OUTPATIENT)
Dept: CARDIOLOGY | Facility: HOSPITAL | Age: 72
Discharge: HOME | End: 2022-12-21
Attending: INTERNAL MEDICINE
Payer: MEDICARE

## 2022-12-21 VITALS
HEIGHT: 63 IN | DIASTOLIC BLOOD PRESSURE: 67 MMHG | SYSTOLIC BLOOD PRESSURE: 136 MMHG | WEIGHT: 193 LBS | HEART RATE: 80 BPM | BODY MASS INDEX: 34.2 KG/M2

## 2022-12-21 DIAGNOSIS — R00.0 TACHYCARDIA: ICD-10-CM

## 2022-12-21 LAB
AORTIC ROOT ANNULUS: 2.36 CM
ASCENDING AORTA: 2.97 CM
AV INDEX (PROSTH): 0.56
AV MEAN GRADIENT: 5 MMHG
AV PEAK GRADIENT: 8 MMHG
AV VALVE AREA: 1.57 CM2
AV VELOCITY RATIO: 0.62
BSA FOR ECHO PROCEDURE: 1.97 M2
CV ECHO LV RWT: 0.68 CM
DOP CALC AO PEAK VEL: 1.41 M/S
DOP CALC AO VTI: 29.6 CM
DOP CALC LVOT AREA: 2.8 CM2
DOP CALC LVOT DIAMETER: 1.89 CM
DOP CALC LVOT PEAK VEL: 0.87 M/S
DOP CALC LVOT STROKE VOLUME: 46.55 CM3
DOP CALC RVOT PEAK VEL: 0.68 M/S
DOP CALC RVOT VTI: 14.9 CM
DOP CALCLVOT PEAK VEL VTI: 16.6 CM
E WAVE DECELERATION TIME: 139.38 MSEC
E/A RATIO: 0.71
E/E' RATIO: 7.88 M/S
ECHO LV POSTERIOR WALL: 1.14 CM (ref 0.6–1.1)
EJECTION FRACTION: 60 %
FRACTIONAL SHORTENING: 32 % (ref 28–44)
INTERVENTRICULAR SEPTUM: 1.27 CM (ref 0.6–1.1)
IVRT: 82.78 MSEC
LA MAJOR: 4.48 CM
LA MINOR: 3.73 CM
LA WIDTH: 2.9 CM
LEFT ATRIUM SIZE: 3.04 CM
LEFT ATRIUM VOLUME INDEX MOD: 13.5 ML/M2
LEFT ATRIUM VOLUME INDEX: 16.1 ML/M2
LEFT ATRIUM VOLUME MOD: 25.66 CM3
LEFT ATRIUM VOLUME: 30.5 CM3
LEFT INTERNAL DIMENSION IN SYSTOLE: 2.28 CM (ref 2.1–4)
LEFT VENTRICLE DIASTOLIC VOLUME INDEX: 23.84 ML/M2
LEFT VENTRICLE DIASTOLIC VOLUME: 45.29 ML
LEFT VENTRICLE MASS INDEX: 67 G/M2
LEFT VENTRICLE SYSTOLIC VOLUME INDEX: 9.3 ML/M2
LEFT VENTRICLE SYSTOLIC VOLUME: 17.74 ML
LEFT VENTRICULAR INTERNAL DIMENSION IN DIASTOLE: 3.34 CM (ref 3.5–6)
LEFT VENTRICULAR MASS: 127.77 G
LV LATERAL E/E' RATIO: 8.38 M/S
LV SEPTAL E/E' RATIO: 7.44 M/S
LVOT MG: 1.97 MMHG
LVOT MV: 0.68 CM/S
MV PEAK A VEL: 0.94 M/S
MV PEAK E VEL: 0.67 M/S
MV STENOSIS PRESSURE HALF TIME: 40.42 MS
MV VALVE AREA P 1/2 METHOD: 5.44 CM2
PISA TR MAX VEL: 2.11 M/S
PULM VEIN S/D RATIO: 1.61
PV MEAN GRADIENT: 1.07 MMHG
PV PEAK D VEL: 0.36 M/S
PV PEAK S VEL: 0.58 M/S
PV PEAK VELOCITY: 1.01 CM/S
RA MAJOR: 3.53 CM
RA PRESSURE: 3 MMHG
RA WIDTH: 3.23 CM
RIGHT VENTRICULAR END-DIASTOLIC DIMENSION: 2.91 CM
SINUS: 2.28 CM
STJ: 1.92 CM
TDI LATERAL: 0.08 M/S
TDI SEPTAL: 0.09 M/S
TDI: 0.09 M/S
TR MAX PG: 18 MMHG
TV REST PULMONARY ARTERY PRESSURE: 21 MMHG

## 2022-12-21 PROCEDURE — 93306 TTE W/DOPPLER COMPLETE: CPT | Mod: 26,,, | Performed by: INTERNAL MEDICINE

## 2022-12-21 PROCEDURE — 93306 ECHO (CUPID ONLY): ICD-10-PCS | Mod: 26,,, | Performed by: INTERNAL MEDICINE

## 2022-12-21 PROCEDURE — 93306 TTE W/DOPPLER COMPLETE: CPT

## 2022-12-22 ENCOUNTER — PATIENT MESSAGE (OUTPATIENT)
Dept: CARDIOLOGY | Facility: CLINIC | Age: 72
End: 2022-12-22
Payer: MEDICARE

## 2022-12-30 PROBLEM — H65.91: Status: ACTIVE | Noted: 2022-12-21

## 2022-12-30 NOTE — ASSESSMENT & PLAN NOTE
Encourage to stay active and mobile to extent able - f/u w PT in [place] - meloxicam, tumeric added/recommended by Rheum

## 2022-12-30 NOTE — ASSESSMENT & PLAN NOTE
Cont current Rx and f/u w Psychiatry - also encourage cont  F/u w O65+ LCSW as appropriate and work on increasing daily physical activity/movement and other behavioral/somatic interventions

## 2022-12-30 NOTE — ASSESSMENT & PLAN NOTE
Cont metformin - cont healthier food and beverage choices - encourage increase movement, daily physical activity.  Rybelsus added by Cardiology, Dr. Ireland, but unclear if insurance will cover w/o diagnosis of Type 2 DM.

## 2022-12-30 NOTE — ASSESSMENT & PLAN NOTE
Cont metformin - cont healthier food and beverage choices - encourage increase movement, daily physical activity.  Rybelsus added by Cardiology, Dr. Ireland, but unclear if insurance will cover w/o diagnosis of Type 2 DM.  - recheck HgbA1c prior to March appointment?

## 2022-12-30 NOTE — ASSESSMENT & PLAN NOTE
Cont current Rx and f/u w Psychiatry -  - also encourage cont  F/u w O65+ LCSW as appropriate and work on increasing daily physical activity/movement and other behavioral/somatic interventions

## 2022-12-30 NOTE — ASSESSMENT & PLAN NOTE
S/p lumpectomy 2018 - cont f/u w Gen Surg q 6 mos - cont surveillance mammograms - cont tamoxifen (may need to connect w new breast surgeon - consider Dr. Araujo? Dr. Santos?)

## 2023-01-03 ENCOUNTER — PATIENT MESSAGE (OUTPATIENT)
Dept: PRIMARY CARE CLINIC | Facility: CLINIC | Age: 73
End: 2023-01-03
Payer: MEDICARE

## 2023-01-04 ENCOUNTER — DOCUMENTATION ONLY (OUTPATIENT)
Dept: PRIMARY CARE CLINIC | Facility: CLINIC | Age: 73
End: 2023-01-04
Payer: MEDICARE

## 2023-01-04 ENCOUNTER — PATIENT MESSAGE (OUTPATIENT)
Dept: PRIMARY CARE CLINIC | Facility: CLINIC | Age: 73
End: 2023-01-04
Payer: MEDICARE

## 2023-01-04 NOTE — PROGRESS NOTES
"  Individual Psychotherapy (LCSW)  Alison Delgadorer,  1/4/2023  DATE:  1/4/2023  TYPE OF VISIT:  {[facility] VISIT TYPE:75983}  LENGTH OF SESSION: {Length of Service:79827}    Therapeutic Intervention: Met with {Relatives of adult:20512::"patient"} for {PSY PLAN PHD/LCSW:50710}.    Chief complaint/reason for encounter: {PSY SW CHIEF COMPLAINTS:18339}       Session Content/Presenting Problem:      Current symptoms:  Depression: {Depression Symptoms:57565}.  Anxiety: {Anxiety Symptoms:20048}.  Insomnia:  denies  .  Heidi:  {Bipolar SX:63426}.  Psychosis: {Psychosis SX:82087}.    Risk parameters:  {parameters:54352::"Patient reports no suicidal ideation","Patient reports no homicidal ideation","Patient reports no self-injurious behavior","Patient reports no violent behavior"}    MENTAL HEALTH STATUS EXAM  General Appearance:  {appearance:12632::"unremarkable","age appropriate"}   Speech: {findings; speech psych:34172::"normal tone","normal rate","normal pitch","normal volume"}      Level of Cooperation: {cooperation:25920}      Thought Processes: {thought process:58196::"normal and logical"}   Mood: {mood:66518}      Thought Content: {thought content:53906::"normal, no suicidality, no homicidality, delusions, or paranoia"}   Affect: {desc; affect:63545::"congruent and appropriate"}   Orientation: {orientation:62734}   Attention Span & Concentration: {attention span:31788}   Fund of General Knowledge: {education:14317}   Judgment & Insight: {judgment/insight:90907}     Language  {PSY LANGUAGE:39519}       STRENGTHS AND LIABILITIES: {PSY STRENGTHS/LIABILITIES:72924}    IMPRESSION:   My diagnostic impression is {BHI DIAGNOSIS LIST:16513}, as evidenced by ***.     TREATMENT GOALS (per patient):    Treatment plan:  Target symptoms: {PSY TARGET SYMPTOMS:59700}  Why chosen therapy is appropriate versus another modality: {PSY THERAPY VS MODALITY:25576}  Outcome monitoring methods: {PSY OUTCOME MONITORING METHODS:97535}  Therapeutic " intervention type: {types:86853}    Patient's response to intervention:  The patient's response to intervention is {response:32139}.    Progress toward goals and other mental status changes:  The patient's progress toward goals is {progress:58245}.    Plan: Pt plans to continue {PSY PLAN PHD/LCSW:61078} {Therapy Types:67326} will be utilized in future {Treatment Types:78248} therapy sessions to increase {Treatment Types:16020}.     Return to clinic: {return:21329}

## 2023-01-09 ENCOUNTER — PATIENT MESSAGE (OUTPATIENT)
Dept: PRIMARY CARE CLINIC | Facility: CLINIC | Age: 73
End: 2023-01-09
Payer: MEDICARE

## 2023-01-12 ENCOUNTER — DOCUMENTATION ONLY (OUTPATIENT)
Dept: PRIMARY CARE CLINIC | Facility: CLINIC | Age: 73
End: 2023-01-12
Payer: MEDICARE

## 2023-01-12 NOTE — PROGRESS NOTES
"Individual Psychotherapy (LCSW) Termination Session  Alison Lobo,  1/12/2023  DATE:  1/12/2023  TYPE OF VISIT:  In person  LENGTH OF SESSION: 60    Therapeutic Intervention: Met with patient for individual psychotherapy.    Chief complaint/reason for encounter: depression     Session Content/Presenting Problem:  LCSW met with patient for a termination session. She is progressing towards her goal of becoming part of the [employer] since her move from [place]. She is more involved with a friend that needs her support and she has a network of other friends that she can call for various social engagements. She would like to find more opportunities to be "relevant" now that she is retired. 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 She has also made significant progress with her sleep hygiene and she exhibited great insights and has modified her behaviors to promote improved sleep. Improved sleep has in turn improved her mental clarify and mood. The end of the session was spent processing her relationship with her daughter and identifying ANTs and behavior patterns that lead to miscommunication with her daughter. She ended the session realizing that she does not have control over her daughters behaviors or mood and she would like to spend more time focusing on her own happiness. She will reach out to new [facility] when she is ready, but for now she will continue to utilizing journaling  to explore what new values and goals she would like to work on. Patient to f/u as needed.     Current symptoms:  Depression: denies.  Anxiety: denies.  Insomnia:  denies .  Heidi:  denies.  Psychosis: denies .      Risk parameters:  Patient reports no suicidal ideation  Patient reports no homicidal ideation  Patient reports no self-injurious behavior  Patient reports no violent behavior    MENTAL HEALTH STATUS EXAM  General Appearance:  unremarkable, age appropriate   Speech: " normal tone, normal rate, normal pitch, normal volume      Level of Cooperation: cooperative      Thought Processes: normal and logical   Mood: steady      Thought Content: normal, no suicidality, no homicidality, delusions, or paranoia   Affect: congruent and appropriate   Orientation: Oriented x3   Attention Span & Concentration: intact   Fund of General Knowledge: intact and appropriate to age and level of education   Judgment & Insight: good     Language  intact       STRENGTHS AND LIABILITIES: Strength: Patient accepts guidance/feedback, Strength: Patient is expressive/articulate.    IMPRESSION:   My diagnostic impression is Anxiety disorders; anxiety, unspecified [F41.9] and Persistent Depressive Disorder, Dysthymia 300.4 (F34.1).    TREATMENT GOALS (per patient):    Treatment plan:  Target symptoms: depression, anxiety   Why chosen therapy is appropriate versus another modality: evidence based practice  Outcome monitoring methods: self-report, observation  Therapeutic intervention type: behavior modifying psychotherapy    Patient's response to intervention:  The patient's response to intervention is accepting.    Progress toward goals and other mental status changes:  The patient's progress toward goals is good.    Plan: Pt plans to continue individual psychotherapy CBT will be utilized in future individual therapy sessions to increase behavior modification.     Return to clinic: as needed

## 2023-01-24 ENCOUNTER — PATIENT MESSAGE (OUTPATIENT)
Dept: PRIMARY CARE CLINIC | Facility: CLINIC | Age: 73
End: 2023-01-24
Payer: MEDICARE

## 2023-02-02 ENCOUNTER — DOCUMENTATION ONLY (OUTPATIENT)
Dept: PRIMARY CARE CLINIC | Facility: CLINIC | Age: 73
End: 2023-02-02
Payer: MEDICARE

## 2023-02-02 LAB
BMD RECOMMENDATION EXT: NORMAL
BMD RECOMMENDATION EXT: NORMAL

## 2023-02-17 ENCOUNTER — PATIENT OUTREACH (OUTPATIENT)
Dept: ADMINISTRATIVE | Facility: HOSPITAL | Age: 73
End: 2023-02-17
Payer: MEDICARE

## 2023-03-08 ENCOUNTER — OFFICE VISIT (OUTPATIENT)
Dept: PRIMARY CARE CLINIC | Facility: CLINIC | Age: 73
End: 2023-03-08
Payer: MEDICARE

## 2023-03-08 VITALS
HEART RATE: 93 BPM | HEIGHT: 63 IN | BODY MASS INDEX: 33.78 KG/M2 | TEMPERATURE: 98 F | WEIGHT: 190.63 LBS | OXYGEN SATURATION: 97 % | SYSTOLIC BLOOD PRESSURE: 135 MMHG | DIASTOLIC BLOOD PRESSURE: 63 MMHG

## 2023-03-08 DIAGNOSIS — M15.9 PRIMARY OSTEOARTHRITIS INVOLVING MULTIPLE JOINTS: Chronic | ICD-10-CM

## 2023-03-08 DIAGNOSIS — G57.63 MORTON'S NEUROMA OF BOTH FEET: Chronic | ICD-10-CM

## 2023-03-08 DIAGNOSIS — E03.9 ACQUIRED HYPOTHYROIDISM: Chronic | ICD-10-CM

## 2023-03-08 DIAGNOSIS — E78.5 HYPERLIPIDEMIA, UNSPECIFIED HYPERLIPIDEMIA TYPE: Chronic | ICD-10-CM

## 2023-03-08 DIAGNOSIS — Z85.3 HISTORY OF RIGHT BREAST CANCER: ICD-10-CM

## 2023-03-08 DIAGNOSIS — H57.9 RIGHT EYE SYMPTOMS: ICD-10-CM

## 2023-03-08 DIAGNOSIS — N39.3 STRESS INCONTINENCE OF URINE: ICD-10-CM

## 2023-03-08 DIAGNOSIS — G47.33 OSA ON CPAP: Chronic | ICD-10-CM

## 2023-03-08 DIAGNOSIS — F41.1 GAD (GENERALIZED ANXIETY DISORDER): Chronic | ICD-10-CM

## 2023-03-08 DIAGNOSIS — R73.03 PREDIABETES: Chronic | ICD-10-CM

## 2023-03-08 DIAGNOSIS — F32.5 MAJOR DEPRESSIVE DISORDER IN REMISSION, UNSPECIFIED WHETHER RECURRENT: Chronic | ICD-10-CM

## 2023-03-08 DIAGNOSIS — M19.90 OSTEOARTHRITIS, UNSPECIFIED OSTEOARTHRITIS TYPE, UNSPECIFIED SITE: ICD-10-CM

## 2023-03-08 DIAGNOSIS — H65.91 RECURRENT SEROUS OTITIS MEDIA OF RIGHT EAR: ICD-10-CM

## 2023-03-08 DIAGNOSIS — R32 URINARY INCONTINENCE, UNSPECIFIED TYPE: ICD-10-CM

## 2023-03-08 DIAGNOSIS — Z00.00 ENCOUNTER FOR PREVENTIVE HEALTH EXAMINATION: Primary | ICD-10-CM

## 2023-03-08 LAB
ALBUMIN SERPL BCP-MCNC: 3.7 G/DL (ref 3.5–5.2)
ALP SERPL-CCNC: 64 U/L (ref 55–135)
ALT SERPL W/O P-5'-P-CCNC: 49 U/L (ref 10–44)
ANION GAP SERPL CALC-SCNC: 8 MMOL/L (ref 8–16)
AST SERPL-CCNC: 56 U/L (ref 10–40)
BASOPHILS # BLD AUTO: 0.04 K/UL (ref 0–0.2)
BASOPHILS NFR BLD: 0.7 % (ref 0–1.9)
BILIRUB SERPL-MCNC: 0.4 MG/DL (ref 0.1–1)
BUN SERPL-MCNC: 20 MG/DL (ref 8–23)
CALCIUM SERPL-MCNC: 9.5 MG/DL (ref 8.7–10.5)
CHLORIDE SERPL-SCNC: 102 MMOL/L (ref 95–110)
CO2 SERPL-SCNC: 26 MMOL/L (ref 23–29)
CREAT SERPL-MCNC: 0.6 MG/DL (ref 0.5–1.4)
DIFFERENTIAL METHOD: ABNORMAL
EOSINOPHIL # BLD AUTO: 0 K/UL (ref 0–0.5)
EOSINOPHIL NFR BLD: 0.5 % (ref 0–8)
ERYTHROCYTE [DISTWIDTH] IN BLOOD BY AUTOMATED COUNT: 13 % (ref 11.5–14.5)
EST. GFR  (NO RACE VARIABLE): >60 ML/MIN/1.73 M^2
ESTIMATED AVG GLUCOSE: 123 MG/DL (ref 68–131)
GLUCOSE SERPL-MCNC: 97 MG/DL (ref 70–110)
HBA1C MFR BLD: 5.9 % (ref 4–5.6)
HCT VFR BLD AUTO: 41 % (ref 37–48.5)
HGB BLD-MCNC: 13.5 G/DL (ref 12–16)
IMM GRANULOCYTES # BLD AUTO: 0.04 K/UL (ref 0–0.04)
IMM GRANULOCYTES NFR BLD AUTO: 0.7 % (ref 0–0.5)
LYMPHOCYTES # BLD AUTO: 1.3 K/UL (ref 1–4.8)
LYMPHOCYTES NFR BLD: 22.5 % (ref 18–48)
MCH RBC QN AUTO: 32.4 PG (ref 27–31)
MCHC RBC AUTO-ENTMCNC: 32.9 G/DL (ref 32–36)
MCV RBC AUTO: 98 FL (ref 82–98)
MONOCYTES # BLD AUTO: 0.7 K/UL (ref 0.3–1)
MONOCYTES NFR BLD: 11.9 % (ref 4–15)
NEUTROPHILS # BLD AUTO: 3.5 K/UL (ref 1.8–7.7)
NEUTROPHILS NFR BLD: 63.7 % (ref 38–73)
NRBC BLD-RTO: 0 /100 WBC
PLATELET # BLD AUTO: 247 K/UL (ref 150–450)
PMV BLD AUTO: 11.3 FL (ref 9.2–12.9)
POTASSIUM SERPL-SCNC: 4.4 MMOL/L (ref 3.5–5.1)
PROT SERPL-MCNC: 6.6 G/DL (ref 6–8.4)
RBC # BLD AUTO: 4.17 M/UL (ref 4–5.4)
SODIUM SERPL-SCNC: 136 MMOL/L (ref 136–145)
WBC # BLD AUTO: 5.56 K/UL (ref 3.9–12.7)

## 2023-03-08 PROCEDURE — 99999 PR PBB SHADOW E&M-EST. PATIENT-LVL IV: ICD-10-PCS | Mod: PBBFAC,,, | Performed by: NURSE PRACTITIONER

## 2023-03-08 PROCEDURE — 85025 COMPLETE CBC W/AUTO DIFF WBC: CPT | Performed by: NURSE PRACTITIONER

## 2023-03-08 PROCEDURE — G0439 PPPS, SUBSEQ VISIT: HCPCS | Mod: ,,, | Performed by: NURSE PRACTITIONER

## 2023-03-08 PROCEDURE — 80053 COMPREHEN METABOLIC PANEL: CPT | Performed by: NURSE PRACTITIONER

## 2023-03-08 PROCEDURE — 99999 PR PBB SHADOW E&M-EST. PATIENT-LVL IV: CPT | Mod: PBBFAC,,, | Performed by: NURSE PRACTITIONER

## 2023-03-08 PROCEDURE — 83036 HEMOGLOBIN GLYCOSYLATED A1C: CPT | Performed by: NURSE PRACTITIONER

## 2023-03-08 PROCEDURE — 81003 URINALYSIS AUTO W/O SCOPE: CPT | Performed by: NURSE PRACTITIONER

## 2023-03-08 PROCEDURE — G0439 PR MEDICARE ANNUAL WELLNESS SUBSEQUENT VISIT: ICD-10-PCS | Mod: ,,, | Performed by: NURSE PRACTITIONER

## 2023-03-08 PROCEDURE — 99214 OFFICE O/P EST MOD 30 MIN: CPT | Mod: PBBFAC,PN | Performed by: NURSE PRACTITIONER

## 2023-03-08 RX ORDER — MELOXICAM 7.5 MG/1
TABLET ORAL
Qty: 374 TABLET | Refills: 0 | Status: SHIPPED | OUTPATIENT
Start: 2023-03-08 | End: 2024-03-06 | Stop reason: SDUPTHER

## 2023-03-08 NOTE — PATIENT INSTRUCTIONS
Counseling and Referral of Other Preventative  (Italic type indicates deductible and co-insurance are waived)    Patient Name: Alison Lobo  Today's Date: 3/8/2023    Health Maintenance       Date Due Completion Date    Hepatitis C Screening Never done ---    Colorectal Cancer Screening Never done ---    Shingles Vaccine (1 of 2) 10/27/2009 9/1/2009    Mammogram 09/10/2020 9/10/2019    Hemoglobin A1c (Prediabetes) 11/17/2023 11/17/2022    DEXA Scan 05/12/2027 5/12/2022    Lipid Panel 11/07/2027 11/7/2022    TETANUS VACCINE 05/09/2029 5/9/2019        No orders of the defined types were placed in this encounter.    The following information is provided to all patients.  This information is to help you find resources for any of the problems found today that may be affecting your health:                Living healthy guide: www.[facility].[place].[facility]      Understanding Diabetes: www.diabetes.org      Eating healthy: www.cdc.gov/healthyweight      [facility] home safety checklist: www.cdc.gov/steadi/patient.html      Agency on Aging: www.goea.[place].[facility]      Alcoholics anonymous (AA): www.aa.org      Physical Activity: www.arsh.nih.gov/zd5xunx      Tobacco use: www.quitwithusla.org

## 2023-03-08 NOTE — ASSESSMENT & PLAN NOTE
Continues venlafaxine, trazodone.   Followed by psychiatry.   Lab Results   Component Value Date    TSH 1.849 11/17/2022

## 2023-03-08 NOTE — ASSESSMENT & PLAN NOTE
Previously had pelvic floor training that was effective but since stopped exercises.   Check UA. Resume home exercises.

## 2023-03-08 NOTE — ASSESSMENT & PLAN NOTE
Lab Results   Component Value Date    HGBA1C 5.7 (H) 11/17/2022     Encourage lower carb, balanced diet.

## 2023-03-08 NOTE — PROGRESS NOTES
"  Alison Lobo presented for a follow-up Medicare AWV today. The following components were reviewed and updated:    Medical history  Family History  Social history  Allergies and Current Medications  Health Risk Assessment  Health Maintenance  Care Team    **See Completed Assessments for Annual Wellness visit with in the encounter summary    The following assessments were completed:  Depression Screening  Cognitive function Screening  Timed Get Up Test  Whisper Test  Nutrition Screening  PAQ      Vitals:    03/08/23 1112   BP: 135/63   Pulse: 93   Temp: 98.4 °F (36.9 °C)   TempSrc: Oral   SpO2: 97%   Weight: 86.5 kg (190 lb 9.6 oz)   Height: 5' 3" (1.6 m)     Body mass index is 33.76 kg/m².     Previously seeing Dianna for MDD. Interested in meeting with Madison.     PT for shoulder pain? Did not start PT. Feeling better, started meloxicam and turmeric, doesn't feel like she needs PT. "Flair up" bilateral knee and bilateral shoulder pain. Chronic pain r/t injury, "overhead bend" injury, and old skiing injury. Pain affected by sleep. Trying different pillows.     [place]ode on [place] float in [place]!!!    Flonase for serous otitis.     Anxiety not as controlled as she would hopes. On venlafaxine "a long time."  Depressive sx since 16 years old. Previous Prozac managed sx, no longer working. Believes venlafaxine working for depressive sx. Sees psychiatry at [facility] q 3 months. Has appt this month.     Plans to start exercising here. Not sure she will commit!     Some urinary incontinence. Recent onset. Previously had pelvic floor training. No longer doing exercises.     Advance Care Planning     Date: 03/08/2023  Has advanced directive and she will bring a copy. Desires DNR and not to be on feeding tube or breathing tube if at all possible. If she has a terminal illness she would not want to suffer. Lots of chocolate!! HC POA is Edu Cespedes, cousin. Lives in [place]. [phone].       Difficulty with gait, feels " off balance. Wears New Balance sneakers at home with better balance.     Review of Systems   Constitutional:  Negative for activity change, appetite change and unexpected weight change.   HENT:  Negative for dental problem.    Respiratory:  Negative for cough and shortness of breath.    Cardiovascular:  Negative for chest pain.   Gastrointestinal:  Negative for abdominal pain, change in bowel habit, nausea, vomiting and change in bowel habit.   Genitourinary:  Positive for bladder incontinence. Negative for dysuria, flank pain and hematuria.   Neurological:  Negative for dizziness.    Physical Exam  Constitutional:       General: She is not in acute distress.  HENT:      Head: Normocephalic.      Right Ear: Tympanic membrane normal.      Left Ear: Tympanic membrane normal.      Nose: Nose normal.      Mouth/Throat:      Mouth: Mucous membranes are moist.      Pharynx: No oropharyngeal exudate or posterior oropharyngeal erythema.   Eyes:      General: No scleral icterus.     Extraocular Movements: Extraocular movements intact.      Pupils: Pupils are equal, round, and reactive to light.   Neck:      Vascular: No carotid bruit.   Cardiovascular:      Rate and Rhythm: Normal rate and regular rhythm.      Heart sounds: Normal heart sounds.   Pulmonary:      Effort: No respiratory distress.      Breath sounds: Normal breath sounds.   Abdominal:      General: There is no distension.      Palpations: Abdomen is soft.      Tenderness: There is no abdominal tenderness.   Musculoskeletal:         General: No swelling.   Lymphadenopathy:      Cervical: No cervical adenopathy.   Skin:     General: Skin is warm and dry.   Neurological:      General: No focal deficit present.      Mental Status: She is alert.      Cranial Nerves: No cranial nerve deficit.      Gait: Gait normal.   Psychiatric:         Mood and Affect: Mood normal.         Behavior: Behavior normal.         Thought Content: Thought content normal.          Health  Maintenance         Date Due Completion Date    Hepatitis C Screening Never done ---    Colorectal Cancer Screening Never done ---    Shingles Vaccine (1 of 2) 10/27/2009 9/1/2009    Mammogram 09/10/2020 9/10/2019    Hemoglobin A1c (Prediabetes) 11/17/2023 11/17/2022    DEXA Scan 05/12/2027 5/12/2022    Lipid Panel 11/07/2027 11/7/2022    TETANUS VACCINE 05/09/2029 5/9/2019            Seeing Dr Whatley at [facility] in April. Plans to have mammogram prior. Completed Tamoxifen x5 years a few weeks ago.     Had Shingrix previously. [facility] in [place]. 8/6/2019, 11/1/2019.    Hepatitis C screen [facility] [place] [facility].     Cologuard 2 years ago. Had colonoscopy previously, no polyps.       Diagnoses and health risks identified today and associated recommendations/orders:  Chun's neuroma of both feet  Followed by podiatry, Dr Coronado.     Major depressive disorder in remission  Continues venlafaxine, trazodone.   Followed by psychiatry.   Lab Results   Component Value Date    TSH 1.849 11/17/2022         Hyperlipidemia  Lab Results   Component Value Date    LDLCALC 67.0 11/07/2022     Continues atorvastatin.     Acquired hypothyroidism  Lab Results   Component Value Date    TSH 1.849 11/17/2022     Continues levothyroxine.    Prediabetes  Lab Results   Component Value Date    HGBA1C 5.7 (H) 11/17/2022     Encourage lower carb, balanced diet.     Primary osteoarthritis involving multiple joints  Meloxicam, APAP, PRN.   Takes tumeric also.     JADA on CPAP  Encourage to continue CPAP use.     Stress incontinence of urine  Previously had pelvic floor training that was effective but since stopped exercises.   Check UA. Resume home exercises.    Recurrent serous otitis media of right ear  Continue Flonase, no sx today, normal exam.     MEHNAZ (generalized anxiety disorder)  Encouraged to discuss sx with psychiatrist.   Referral to LCSW.    History of right breast cancer  Recently completed Tamoxifen.   Has appt with   Phylicia in April.        Problem List Items Addressed This Visit          Neuro    Chun's neuroma of both feet (Chronic)     Followed by podiatry, Dr Coronado.             Psychiatric    Major depressive disorder in remission (Chronic)     Continues venlafaxine, trazodone.   Followed by psychiatry.   Lab Results   Component Value Date    TSH 1.849 11/17/2022              Relevant Orders    CBC Auto Differential    Ambulatory referral/consult to [facility]    MEHNAZ (generalized anxiety disorder) (Chronic)     Encouraged to discuss sx with psychiatrist.   [facility]            ENT    Recurrent serous otitis media of right ear     Continue Flonase, no sx today, normal exam.             Cardiac/Vascular    Hyperlipidemia (Chronic)     Lab Results   Component Value Date    LDLCALC 67.0 11/07/2022   Continues atorvastatin.             Renal/    Stress incontinence of urine     Previously had pelvic floor training that was effective but since stopped exercises.   Check UA. Resume home exercises.            Oncology    History of right breast cancer     Recently completed Tamoxifen.   Has appt with Dr Whatley in April.             Endocrine    Prediabetes (Chronic)     Lab Results   Component Value Date    HGBA1C 5.7 (H) 11/17/2022   Encourage lower carb, balanced diet.          Relevant Orders    Comprehensive Metabolic Panel    Hemoglobin A1C    Acquired hypothyroidism (Chronic)     Lab Results   Component Value Date    TSH 1.849 11/17/2022   Continues levothyroxine.            Orthopedic    Primary osteoarthritis involving multiple joints (Chronic)     Meloxicam, APAP, PRN.   Takes tumeric also.             Other    JADA on CPAP (Chronic)     Encourage to continue CPAP use.           Other Visit Diagnoses       Encounter for preventive health examination    -  Primary    Osteoarthritis, unspecified osteoarthritis type, unspecified site        Relevant Medications    meloxicam (MOBIC)  7.5 MG tablet    Urinary incontinence, unspecified type        Relevant Orders    Urinalysis, Reflex to Urine Culture Urine, Clean Catch    Right eye symptoms        Relevant Orders    Ambulatory referral/consult to Ophthalmology            Provided Alison with a 5-10 year written screening schedule and personal prevention plan. Recommendations were developed using the USPSTF age appropriate recommendations. Education, counseling, and referrals were provided as needed.  After Visit Summary printed and given to patient which includes a list of additional screenings\tests needed.      Follow up if symptoms worsen or fail to improve.      RAYMOND Somers FNP-RENAE offered to discuss advanced care planning, including how to pick a person who would make decisions for you if you were unable to make them for yourself, called a health care power of , and what kind of decisions you might make such as use of life sustaining treatments such as ventilators and tube feeding when faced with a life limiting illness recorded on a living will that they will need to know. (How you want to be cared for as you near the end of your natural life)     X Patient is interested in learning more about how to make advanced directives.  I provided them paperwork and offered to discuss this with them.

## 2023-03-09 DIAGNOSIS — K76.0 NAFLD (NONALCOHOLIC FATTY LIVER DISEASE): Primary | ICD-10-CM

## 2023-03-09 LAB
BILIRUB UR QL STRIP: NEGATIVE
CLARITY UR REFRACT.AUTO: ABNORMAL
COLOR UR AUTO: YELLOW
GLUCOSE UR QL STRIP: NEGATIVE
HGB UR QL STRIP: NEGATIVE
KETONES UR QL STRIP: NEGATIVE
LEUKOCYTE ESTERASE UR QL STRIP: NEGATIVE
NITRITE UR QL STRIP: NEGATIVE
PH UR STRIP: 5 [PH] (ref 5–8)
PROT UR QL STRIP: NEGATIVE
SP GR UR STRIP: 1.02 (ref 1–1.03)
URN SPEC COLLECT METH UR: ABNORMAL

## 2023-03-10 ENCOUNTER — TELEPHONE (OUTPATIENT)
Dept: PRIMARY CARE CLINIC | Facility: CLINIC | Age: 73
End: 2023-03-10
Payer: MEDICARE

## 2023-03-10 NOTE — PROGRESS NOTES
My chart message sent to patient stating: Mildly elevated liver enzymes on recent lab, otherwise results okay. Will check HFP in 3 months. Waiting on patients response to see when she would like to schedule this lab for.

## 2023-03-11 ENCOUNTER — PATIENT MESSAGE (OUTPATIENT)
Dept: PRIMARY CARE CLINIC | Facility: CLINIC | Age: 73
End: 2023-03-11
Payer: MEDICARE

## 2023-03-13 ENCOUNTER — PATIENT MESSAGE (OUTPATIENT)
Dept: PRIMARY CARE CLINIC | Facility: CLINIC | Age: 73
End: 2023-03-13
Payer: MEDICARE

## 2023-03-13 ENCOUNTER — TELEPHONE (OUTPATIENT)
Dept: PRIMARY CARE CLINIC | Facility: CLINIC | Age: 73
End: 2023-03-13
Payer: MEDICARE

## 2023-03-20 ENCOUNTER — DOCUMENTATION ONLY (OUTPATIENT)
Dept: PRIMARY CARE CLINIC | Facility: CLINIC | Age: 73
End: 2023-03-20
Payer: MEDICARE

## 2023-03-20 ENCOUNTER — OFFICE VISIT (OUTPATIENT)
Dept: PRIMARY CARE CLINIC | Facility: CLINIC | Age: 73
End: 2023-03-20
Payer: MEDICARE

## 2023-03-20 VITALS
HEIGHT: 63 IN | HEART RATE: 115 BPM | SYSTOLIC BLOOD PRESSURE: 138 MMHG | DIASTOLIC BLOOD PRESSURE: 61 MMHG | TEMPERATURE: 99 F | WEIGHT: 192.19 LBS | OXYGEN SATURATION: 95 % | BODY MASS INDEX: 34.05 KG/M2

## 2023-03-20 DIAGNOSIS — B34.9 VIRAL ILLNESS: ICD-10-CM

## 2023-03-20 DIAGNOSIS — C50.912 MALIGNANT NEOPLASM OF LEFT BREAST IN FEMALE, ESTROGEN RECEPTOR POSITIVE, UNSPECIFIED SITE OF BREAST: ICD-10-CM

## 2023-03-20 DIAGNOSIS — R50.9 FEVER, UNSPECIFIED FEVER CAUSE: ICD-10-CM

## 2023-03-20 DIAGNOSIS — Z17.0 MALIGNANT NEOPLASM OF LEFT BREAST IN FEMALE, ESTROGEN RECEPTOR POSITIVE, UNSPECIFIED SITE OF BREAST: ICD-10-CM

## 2023-03-20 DIAGNOSIS — Z11.59 NEED FOR HEPATITIS C SCREENING TEST: Primary | ICD-10-CM

## 2023-03-20 DIAGNOSIS — E66.09 CLASS 1 OBESITY DUE TO EXCESS CALORIES WITH SERIOUS COMORBIDITY AND BODY MASS INDEX (BMI) OF 34.0 TO 34.9 IN ADULT: Chronic | ICD-10-CM

## 2023-03-20 LAB
CTP QC/QA: YES
CTP QC/QA: YES
POC MOLECULAR INFLUENZA A AGN: NEGATIVE
POC MOLECULAR INFLUENZA B AGN: NEGATIVE
SARS-COV-2 AG RESP QL IA.RAPID: NEGATIVE

## 2023-03-20 PROCEDURE — 87502 INFLUENZA DNA AMP PROBE: CPT | Mod: PBBFAC,PN | Performed by: INTERNAL MEDICINE

## 2023-03-20 PROCEDURE — 99215 OFFICE O/P EST HI 40 MIN: CPT | Mod: S$PBB,,, | Performed by: INTERNAL MEDICINE

## 2023-03-20 PROCEDURE — 99497 ADVNCD CARE PLAN 30 MIN: CPT | Mod: PBBFAC,27,PN | Performed by: INTERNAL MEDICINE

## 2023-03-20 PROCEDURE — 99215 PR OFFICE/OUTPT VISIT, EST, LEVL V, 40-54 MIN: ICD-10-PCS | Mod: S$PBB,,, | Performed by: INTERNAL MEDICINE

## 2023-03-20 PROCEDURE — 99999 PR PBB SHADOW E&M-EST. PATIENT-LVL III: ICD-10-PCS | Mod: PBBFAC,,, | Performed by: INTERNAL MEDICINE

## 2023-03-20 PROCEDURE — 99497 ADVNCD CARE PLAN 30 MIN: CPT | Mod: S$PBB,,, | Performed by: INTERNAL MEDICINE

## 2023-03-20 PROCEDURE — 99497 PR ADVNCD CARE PLAN 30 MIN: ICD-10-PCS | Mod: S$PBB,,, | Performed by: INTERNAL MEDICINE

## 2023-03-20 PROCEDURE — 69209 REMOVE IMPACTED EAR WAX UNI: CPT | Mod: PBBFAC,PN | Performed by: INTERNAL MEDICINE

## 2023-03-20 PROCEDURE — 87811 SARS-COV-2 COVID19 W/OPTIC: CPT | Mod: PBBFAC,PN | Performed by: INTERNAL MEDICINE

## 2023-03-20 PROCEDURE — 99999 PR PBB SHADOW E&M-EST. PATIENT-LVL III: CPT | Mod: PBBFAC,,, | Performed by: INTERNAL MEDICINE

## 2023-03-20 PROCEDURE — 99213 OFFICE O/P EST LOW 20 MIN: CPT | Mod: PBBFAC,25,PN | Performed by: INTERNAL MEDICINE

## 2023-03-20 NOTE — PATIENT INSTRUCTIONS
Recommend repeat CoVid home test in about 36 hrs - recommend isolation and mask until second neg CoVid test    Let us know if CoVid positive or if no improvement in 7-10 days

## 2023-03-20 NOTE — PROGRESS NOTES
"Alison Lobo  03/20/2023  [id number]    Roselia Gillette MD  Patient Care Team:  Roselia Gillette MD as PCP - General (Internal Medicine)  Clarissa Stephenson NP as Nurse Practitioner (Family Medicine)    Visit Type: Follow-up    Chief Complaint:  Chief Complaint   Patient presents with    3 mth f/u      History of Present Illness: Ms. Alison Lobo is a 72 year old female here for f/u. Medical issues include prediabetes, B Chun's neuroma/foot pain, JADA (CPAP), obesity, h/o R breast cancer 2002, L breast cancer 2018 (both small invasive ductal carcinoma), recurrent MDD, HLD, hypothyroid.    Started feeling bad yesterday - cough, cold symptoms  Similar "bout" about 2 weeks ago  Can develop bronchitis   POCT CoVid/Flu negative in clinic here today    Gave up diet coke, cut back coffee to 1/2 cup in am, added oatmeal    Didn't feel she had time to follow through on PT at the [facility] - would prefer HEP and work w [employer] in [place]    Advance Care Planning   Date: 03/20/2023  Living Will  During this visit, I engaged the patient  in the advance care planning process.  The patient and I reviewed the role for advance directives and their purpose in directing future healthcare if the patient's unable to speak for him/herself.  At this point in time, the patient does have full decision-making capacity.  We discussed different extreme health states that she could experience, and reviewed what kind of medical care she would want in those situations.  The patient communicated that if she were comatose and had little chance of a meaningful recovery, she would not want machines/life-sustaining treatments used. In addition to the above preference, other important end-of-life issues for the patient include  that she would not want nutrition or hydration to be administered invasively  The patient has completed a living will to reflect these preferences.    Advance Care Planning   Power of   The patient " received detailed information about the importance of designating a Health Care Power of  (HCPOA). She was also instructed to communicate with this person about their wishes for future healthcare, should she become sick and lose decision-making capacity. The patient has appointed a HCPOA and has appointed her  cousin , health care agent:  Edu Cespedes  & health care agent number:          I spent a total of 16 minutes engaging the patient in this advance care planning discussion.         From last visit 12/19/22:  S/p hysterectomy and s/p lumpectomies and completed Tx for 2002 R breast cancer. Will be taking tamoxifen for L breast cancer at least through 2023.  Had established care w Gen Surg Dr. Nathan at [facility] with whom plan was to f/u Q 6 mos for monitoring of her breast cancer (however may need to establish w a different Breast Oncologist?). Also established w Psychiatrist Dr. Yates at [facility], in addition to Podiatrist, Pulmonologist and Ophthalmologist.   Repeat sleep study completed. Doing better w CPAP.  HR improved w switch from verapamil to metoprolol.  Plan to start PT after holidays. Foot feeling better.   Rheum advised tumeric w black pepper and prescribed mobic.  Found sessions w [facility] ALEXA[facility]ianna, to be helpful.  PHQ4: 7    Hosp/ED/Urgent Care:  8/23/22 [facility]    Recent appointments:   3/08/23 [facility] Clarissa Stephenson, NP AWV  12/19/22 [facility] Dr. Gillette  12/15/22 Rheum Dr. Bess +ADELE, OA f/u 6 mos  12/05/22 Card Dr. Reyes d/c Verapamil start Metoprolol, Rybelsus f/u 1 year  12/01/22 [facility] Dianna Mcnulty LCSW (missed 12/12 appt)  11/18/22 O[facility]+ Dr. Gillette   11/14/22 [facility] Dianna Mcnulty LCSW  11/07/22 [facility] Dianna Mcnulty LCSW intake  10/07/22 O65+ Dr. Gillette establish care     Dec 2022 L shoulder injection?  Sleep study (w CPAP titration and mask fitting)?   12/23/22 Psychiatry Dr. Yates 3 mos f/u ?    Upcoming appointments:  Future Appointments       Date Provider Specialty Appt  Notes    [facility]    4/18/2023 Nguyen Goodrich MD Ophthalmology Right eye symptoms    6/12/2023  Primary Care labs    6/14/2023 Javi Bess MD Rheumatology [email or web address]    12/8/2023 Osmani Reyes MD Cardiology 1 year f/u     The following were reviewed: Active problem list, medication list, allergies, family history, social history, and Health Maintenance.     Medications have been reviewed and reconciled with patient at visit today.    Review of Systems   See HPI above    Exam:  Vitals:    03/20/23 1508   BP: 138/61   Pulse: (!) 115   Temp: 99.4 °F (37.4 °C)     Weight: 87.2 kg (192 lb 3.2 oz)   Body mass index is 34.05 kg/m².    BP Readings from Last 3 Encounters:   03/20/23 138/61   03/08/23 135/63   12/21/22 136/67      Wt Readings from Last 3 Encounters:   03/20/23 1508 87.2 kg (192 lb 3.2 oz)   03/08/23 1112 86.5 kg (190 lb 9.6 oz)   12/21/22 1003 87.5 kg (193 lb)     Physical Exam  Vitals reviewed. Nursing note reviewed: low grade temp.  Constitutional:       General: She is not in acute distress.     Appearance: She is obese. She is ill-appearing.   HENT:      Head: Normocephalic and atraumatic.      Nose: Nose normal.      Mouth/Throat:      Mouth: Mucous membranes are moist.      Pharynx: Oropharynx is clear.   Eyes:      Extraocular Movements: Extraocular movements intact.      Conjunctiva/sclera: Conjunctivae normal.   Cardiovascular:      Rate and Rhythm: Regular rhythm. Tachycardia present.   Pulmonary:      Effort: No respiratory distress.      Comments: cough  Abdominal:      General: Bowel sounds are normal.      Palpations: Abdomen is soft.      Tenderness: There is no abdominal tenderness. There is no guarding.   Skin:     General: Skin is warm and dry.   Neurological:      General: No focal deficit present.      Mental Status: She is alert.      Laboratory Reviewed    Lab Results   Component Value Date    WBC 5.56 03/08/2023    HGB 13.5  03/08/2023    HCT 41.0 03/08/2023     03/08/2023    MCV 98 03/08/2023    CHOL 138 11/07/2022    TRIG 150 11/07/2022    HDL 41 11/07/2022    LDLCALC 67.0 11/07/2022    ALT 49 (H) 03/08/2023    AST 56 (H) 03/08/2023     03/08/2023    K 4.4 03/08/2023     03/08/2023    CREATININE 0.6 03/08/2023    BUN 20 03/08/2023    CO2 26 03/08/2023    MG 1.9 11/07/2022    TSH 1.849 11/17/2022    HGBA1C 5.9 (H) 03/08/2023    CRP 1.1 12/15/2022       3/08/23 eGRF > 60 urinalysis unremarkable    Assessment:   72 y.o. female with multiple co-morbid illnesses here for continued follow up of medical problems.      The primary encounter diagnosis was Need for hepatitis C screening test. Diagnoses of Fever, unspecified fever cause, Malignant neoplasm of left breast in female, estrogen receptor positive, unspecified site of breast, Class 1 obesity due to excess calories with serious comorbidity and body mass index (BMI) of 34.0 to 34.9 in adult, and Viral illness were also pertinent to this visit.      Plan:   1. Need for hepatitis C screening test  -     HEPATITIS C ANTIBODY; Future; Expected date: 06/12/2023    2. Fever, unspecified fever cause  -     SARS Coronavirus 2 Antigen, POCT Manual Read  -     POCT Influenza A/B Molecular    3. Malignant neoplasm of left breast in female, estrogen receptor positive, unspecified site of breast  Assessment & Plan:  F/u w breast surgeon as scheduled      4. Class 1 obesity due to excess calories with serious comorbidity and body mass index (BMI) of 34.0 to 34.9 in adult  Assessment & Plan:  Referral to [facility] - encourage to take advantage of [place] once oriented - work on incorporating more enjoyable movement and healthy nutritional choices into daily routine      5. Viral illness         Health Maintenance         Date Due Completion Date    Hepatitis C Screening Never done ---    Colorectal Cancer Screening Never done ---    Shingles Vaccine (1 of  2) 10/27/2009 9/1/2009    Mammogram 09/10/2020 9/10/2019    Hemoglobin A1c (Prediabetes) 03/08/2024 3/8/2023    DEXA Scan 05/12/2027 5/12/2022    Lipid Panel 11/07/2027 11/7/2022    TETANUS VACCINE 05/09/2029 5/9/2019            -Patient's lab results were reviewed and discussed with patient  -Treatment options and alternatives were discussed with the patient. Patient expressed understanding. Patient was given the opportunity to ask questions and be an active participant in their medical care. Patient had no further questions or concerns at this time.   -Patient is an overall moderate risk for health complications from their medical conditions.     Follow up: Follow up in about 3 months (around 6/20/2023) for Follow Up.      After visit summary printed and given to patient upon discharge.  Patientcare plan are included in After visit summary.    TOTAL TIME evaluating and managing this patient for this encounter was greater than 60 minutes. The remainder of this time was spent personally by me on some of the following activities: review of patient's past medical history, assessing age-appropriate health maintenance needs, review of any interval history, review and interpretation of lab results, review and interpretation of imaging test results, review and interpretation of cardiology test results, reviewing consulting specialist notes, obtaining history from the patient and family, examination of the patient, medication reconciliation, managing and/or ordering prescription medications, ordering imaging tests, ordering referral to subspecialty provider(s), educating patient and answering their questions about diagnosis, treatment plan, and goals of treatment, discussing planned follow-up and final documentation of the visit. This time was exclusive of any separately billable procedures for this patient and exclusive of time spent treating any other patients.

## 2023-03-27 ENCOUNTER — CLINICAL SUPPORT (OUTPATIENT)
Dept: PRIMARY CARE CLINIC | Facility: CLINIC | Age: 73
End: 2023-03-27
Payer: MEDICARE

## 2023-03-27 DIAGNOSIS — F32.5 MAJOR DEPRESSIVE DISORDER IN REMISSION, UNSPECIFIED WHETHER RECURRENT: Chronic | ICD-10-CM

## 2023-03-27 PROCEDURE — 99211 OFF/OP EST MAY X REQ PHY/QHP: CPT | Mod: PBBFAC,PN

## 2023-03-27 PROCEDURE — 99499 UNLISTED E&M SERVICE: CPT | Mod: S$PBB,,,

## 2023-03-27 PROCEDURE — 99999 PR PBB SHADOW E&M-EST. PATIENT-LVL I: ICD-10-PCS | Mod: PBBFAC,,,

## 2023-03-27 PROCEDURE — 99499 NO LOS: ICD-10-PCS | Mod: S$PBB,,,

## 2023-03-27 PROCEDURE — 99999 PR PBB SHADOW E&M-EST. PATIENT-LVL I: CPT | Mod: PBBFAC,,,

## 2023-03-27 NOTE — PROGRESS NOTES
"Individual Psychotherapy (LCSW)  Alison Lobo,  3/27/2023  DATE:  3/27/2023  TYPE OF VISIT:  In person  LENGTH OF SESSION: 45    Therapeutic Intervention: Met with patient for individual psychotherapy.    Chief complaint/reason for encounter: anxiety       Session Content/Presenting Problem:    Patient has been seen before by the previous LCSW.  She presents today to meet the current LCSW. Patient is originally from [place], but left when she was 18. 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 She retired to [place] for a while; [place] had long been an escape for her and she enjoyed living there. She was persuaded to move back to [facility] by her cousin, Beatriz. She considers her cousin to be more like a sister. She says the decision to move was a "fear based decision" made partly due to Covid and the death of some close friends and her long time therapist. The move was very stressful and she cannot stand to think of moving again. She is not happy being in  but she has begun painting for fun, and is now on the board of the [employer]. She finds a lack of intellectual stimulation here. She says she would like to be back in [place] "where my soul was"; she would like to feel relevant, and she would like to feel more at peace with life in general. She is hurt by her daughter's estrangement from her for the last 8 years; this is her only child. Patient is also an only child; her parents are both . She has cousins and good friends as a social support system.  Her daughter does allow her to spend time with the grandkids.  Discussed the difficulty that can arise when a parent has trouble relating to a child. She and her spouse were career minded and focused on success; she describes her daughter as "weird". The daughter does maintain a relationship with patient's ex-. They  when the daughter was 2 years old.  Patient reports a long history of " "depression; she spent two months away from work in 2010 when her symptoms became too much to manage while working.      Current symptoms:  Depression: anhedonia, fatigue, and increased appetite.  Anxiety: excessive worrying.  Insomnia: non-restful sleep.  Heidi:  denies.  Psychosis: denies .      Risk parameters:  Patient reports no suicidal ideation  Patient reports no homicidal ideation  Patient reports no self-injurious behavior  Patient reports no violent behavior    MENTAL HEALTH STATUS EXAM  General Appearance:  unremarkable, age appropriate   Speech: normal tone, normal rate, normal pitch, normal volume      Level of Cooperation: cooperative      Thought Processes: normal and logical   Mood: steady      Thought Content: normal, no suicidality, no homicidality, delusions, or paranoia   Affect: congruent and appropriate   Orientation: Oriented x3   Attention Span & Concentration: intact   Fund of General Knowledge: intact and appropriate to age and level of education   Judgment & Insight: good     Language  intact       STRENGTHS AND LIABILITIES: Strength: Patient accepts guidance/feedback, Strength: Patient is expressive/articulate., Strength: Patient is intelligent.    IMPRESSION:   My diagnostic impression is Anxiety disorders; generalized anxiety disorder [F41.1] and Persistent Depressive Disorder, Dysthymia 300.4 (F34.1), as evidenced by patient report of feeling tired, having little energy, feeling down, feeling nervous and worrying too much.     TREATMENT GOALS (per patient):    Patient says she would like to have "a back up plan for anxiety". She would like to feel more peaceful, with less anxiety. She would like to feel more motivated to exercise.     Treatment plan:  Target symptoms: depression, anxiety   Why chosen therapy is appropriate versus another modality: relevant to diagnosis, patient responds to this modality, evidence based practice  Outcome monitoring methods: self-report, observation, " "checklist/rating scale  Therapeutic intervention type: behavior modifying psychotherapy    Patient's response to intervention:  The patient's response to intervention is accepting.    Progress toward goals and other mental status changes:  The patient's progress toward goals is good.    Plan: Pt plans to continue individual psychotherapy CBT will be utilized in future individual therapy sessions to increase interaction, insight, support, and behavior modification.     Return to clinic: as needed. Patient reports feeling "doctored out"; she will follow up when ready to continue with counseling.                 "

## 2023-03-31 ENCOUNTER — DOCUMENTATION ONLY (OUTPATIENT)
Dept: REHABILITATION | Facility: HOSPITAL | Age: 73
End: 2023-03-31
Payer: MEDICARE

## 2023-03-31 NOTE — CONSULTS
Health  Consult Note    Name: Alison Lobo  Clinic Number: [id number]  Physician: No ref. provider found  Past Medical History:   Diagnosis Date    Cancer     Hyperlipidemia        Coaching performed performed: yes    Subjective:   Patient reports desire to lose weight.    Alison was instructed to start preparing healthy meals at the beginning of the week. She was also instructed to exercise at least 3x a week.       INITIAL date 3/31/23  One a scale of 1-10, with 10 being 100% happy, how would you rate your happiness in each of the wellness areas below?    Happiness:         1     2     3     4     5     6     7     8     9     10    Initial Date: DC Date: +/- Total Change   Exercise/Movement 5     Physical Health 7     Stress Level 9     Nutrition 6     Sleep 9     Play 8     Body Image 9     Relationships 10     Energy/Vitality 8       Assessment:   Patient is adjusting to living in a new state and would like to be consistent with a new healthy routine.    Plan:  Patient goals for next consult include going over machines, improving diet, and increasing exercise.     Health : Janette Rose MA  3/31/2023

## 2023-04-05 ENCOUNTER — OFFICE VISIT (OUTPATIENT)
Dept: SURGERY | Facility: CLINIC | Age: 73
End: 2023-04-05
Payer: MEDICARE

## 2023-04-05 VITALS — BODY MASS INDEX: 34.06 KG/M2 | WEIGHT: 192.25 LBS | HEIGHT: 63 IN

## 2023-04-05 DIAGNOSIS — Z17.0 MALIGNANT NEOPLASM OF UPPER-OUTER QUADRANT OF LEFT BREAST IN FEMALE, ESTROGEN RECEPTOR POSITIVE: ICD-10-CM

## 2023-04-05 DIAGNOSIS — C50.012 MALIGNANT NEOPLASM OF NIPPLE OF LEFT BREAST IN FEMALE, ESTROGEN RECEPTOR POSITIVE: Primary | ICD-10-CM

## 2023-04-05 DIAGNOSIS — Z85.3 HISTORY OF RIGHT BREAST CANCER: ICD-10-CM

## 2023-04-05 DIAGNOSIS — C50.412 MALIGNANT NEOPLASM OF UPPER-OUTER QUADRANT OF LEFT BREAST IN FEMALE, ESTROGEN RECEPTOR POSITIVE: ICD-10-CM

## 2023-04-05 DIAGNOSIS — Z17.0 MALIGNANT NEOPLASM OF NIPPLE OF LEFT BREAST IN FEMALE, ESTROGEN RECEPTOR POSITIVE: Primary | ICD-10-CM

## 2023-04-05 LAB
ALBUMIN SERPL BCP-MCNC: 3.9 G/DL (ref 3.5–5.2)
ALP SERPL-CCNC: 79 U/L (ref 55–135)
ALT SERPL W/O P-5'-P-CCNC: 51 U/L (ref 10–44)
ANION GAP SERPL CALC-SCNC: 11 MMOL/L (ref 8–16)
AST SERPL-CCNC: 56 U/L (ref 10–40)
BASOPHILS # BLD AUTO: 0.03 K/UL (ref 0–0.2)
BASOPHILS NFR BLD: 0.6 % (ref 0–1.9)
BILIRUB SERPL-MCNC: 0.3 MG/DL (ref 0.1–1)
BUN SERPL-MCNC: 18 MG/DL (ref 8–23)
CALCIUM SERPL-MCNC: 9.6 MG/DL (ref 8.7–10.5)
CHLORIDE SERPL-SCNC: 108 MMOL/L (ref 95–110)
CO2 SERPL-SCNC: 23 MMOL/L (ref 23–29)
CREAT SERPL-MCNC: 0.7 MG/DL (ref 0.5–1.4)
DIFFERENTIAL METHOD: ABNORMAL
EOSINOPHIL # BLD AUTO: 0.1 K/UL (ref 0–0.5)
EOSINOPHIL NFR BLD: 1 % (ref 0–8)
ERYTHROCYTE [DISTWIDTH] IN BLOOD BY AUTOMATED COUNT: 12.8 % (ref 11.5–14.5)
EST. GFR  (NO RACE VARIABLE): >60 ML/MIN/1.73 M^2
GLUCOSE SERPL-MCNC: 113 MG/DL (ref 70–110)
HCT VFR BLD AUTO: 40.9 % (ref 37–48.5)
HGB BLD-MCNC: 13.4 G/DL (ref 12–16)
IMM GRANULOCYTES # BLD AUTO: 0.01 K/UL (ref 0–0.04)
IMM GRANULOCYTES NFR BLD AUTO: 0.2 % (ref 0–0.5)
LYMPHOCYTES # BLD AUTO: 1.5 K/UL (ref 1–4.8)
LYMPHOCYTES NFR BLD: 32.3 % (ref 18–48)
MCH RBC QN AUTO: 32 PG (ref 27–31)
MCHC RBC AUTO-ENTMCNC: 32.8 G/DL (ref 32–36)
MCV RBC AUTO: 98 FL (ref 82–98)
MONOCYTES # BLD AUTO: 0.5 K/UL (ref 0.3–1)
MONOCYTES NFR BLD: 11.3 % (ref 4–15)
NEUTROPHILS # BLD AUTO: 2.6 K/UL (ref 1.8–7.7)
NEUTROPHILS NFR BLD: 54.6 % (ref 38–73)
NRBC BLD-RTO: 0 /100 WBC
PLATELET # BLD AUTO: 258 K/UL (ref 150–450)
PMV BLD AUTO: 10.9 FL (ref 9.2–12.9)
POTASSIUM SERPL-SCNC: 4.4 MMOL/L (ref 3.5–5.1)
PROT SERPL-MCNC: 6.6 G/DL (ref 6–8.4)
RBC # BLD AUTO: 4.19 M/UL (ref 4–5.4)
SODIUM SERPL-SCNC: 142 MMOL/L (ref 136–145)
WBC # BLD AUTO: 4.77 K/UL (ref 3.9–12.7)

## 2023-04-05 PROCEDURE — 82378 CARCINOEMBRYONIC ANTIGEN: CPT | Performed by: SPECIALIST

## 2023-04-05 PROCEDURE — 80053 COMPREHEN METABOLIC PANEL: CPT | Performed by: SPECIALIST

## 2023-04-05 PROCEDURE — 99203 PR OFFICE/OUTPT VISIT, NEW, LEVL III, 30-44 MIN: ICD-10-PCS | Mod: S$GLB,,, | Performed by: SPECIALIST

## 2023-04-05 PROCEDURE — 85025 COMPLETE CBC W/AUTO DIFF WBC: CPT | Performed by: SPECIALIST

## 2023-04-05 PROCEDURE — 83615 LACTATE (LD) (LDH) ENZYME: CPT | Performed by: SPECIALIST

## 2023-04-05 PROCEDURE — 99203 OFFICE O/P NEW LOW 30 MIN: CPT | Mod: S$GLB,,, | Performed by: SPECIALIST

## 2023-04-05 NOTE — ASSESSMENT & PLAN NOTE
Patient is doing well and is being followed according to NCCN Guidelines. She understands that her imaging and exams have remained stable and is comfortable being followed in a conservative fashion. She understands the importance of monthly self-breast examination and knows to report any and all changes as they occur.    We discussed current data that demonstrates better outcomes if Tamoxifen is taken for 10 years instead of 5 years.  R/B/I and alternatives were discussed. She has decided to continue her Hormonal Therapy in the extended adjuvant setting.

## 2023-04-05 NOTE — PROGRESS NOTES
[facility]  MD Cailin Russell, NP-C        Chief Complaint:   Alison Lobo is a 72 y.o. female presenting today to establish care given her past bilateral breast cancers.    History of Present Illness:   Mrs. Lobo presents on 2023 to establish care.  She has a history of bilateral breast cancer.  Her right breast cancer was diagnosed in 2022 and she is s/p Lumpectomy and Radiation.  She was placed on Arimidex but did not tolerate this.  She then completed Tamoxifen.  Her left breast diagnosis was in 2018 and she also elected lumpectomy and radiation.  She was placed on Tamoxifen and recently stopped this as she completed 5 years of treatment.    Past Medical History:   Diagnosis Date    Cancer     Hyperlipidemia       Past Surgical History:   Procedure Laterality Date    BREAST SURGERY       SECTION      HYSTERECTOMY          Current Outpatient Medications:     atorvastatin (LIPITOR) 20 MG tablet, TAKE 1 TABLET BY MOUTH DAILY, Disp: 90 tablet, Rfl: 1    cetirizine (ZYRTEC) 10 MG tablet, Take 10 mg by mouth once daily., Disp: , Rfl:     fluticasone propionate (FLONASE) 50 mcg/actuation nasal spray, 1 spray (50 mcg total) by Each Nostril route once daily., Disp: 11 mL, Rfl: 5    levothyroxine (SYNTHROID) 25 MCG tablet, Take 1 tablet (25 mcg total) by mouth before breakfast., Disp: 30 tablet, Rfl: 5    meloxicam (MOBIC) 7.5 MG tablet, Take 2 tablets (15 mg total) by mouth once daily for 7 days, THEN 1 tablet (7.5 mg total) once daily., Disp: 374 tablet, Rfl: 0    metFORMIN (GLUCOPHAGE) 500 MG tablet, Take 1,000 mg by mouth once daily. Takes 2.5 500mg QD, Disp: , Rfl:     metoprolol succinate (TOPROL-XL) 50 MG 24 hr tablet, Take 1 tablet (50 mg total) by mouth once daily., Disp: 30 tablet, Rfl: 11    montelukast (SINGULAIR) 10 mg tablet, Take 10 mg by mouth every evening., Disp: , Rfl:     tamoxifen (NOLVADEX) 20 MG Tab, Take  20 mg by mouth once daily., Disp: , Rfl:     traZODone (DESYREL) 50 MG tablet, Take 50 mg by mouth every evening., Disp: , Rfl:     venlafaxine (EFFEXOR-XR) 150 MG Cp24, Take 150 mg by mouth once daily. Takes 2 pills QD, Disp: , Rfl:     vitamin D (VITAMIN D3) 1000 units Tab, once daily., Disp: , Rfl:    Review of patient's allergies indicates:  No Known Allergies   Social History     Tobacco Use    Smoking status: Never    Smokeless tobacco: Never   Substance Use Topics    Alcohol use: Never      Family History   Problem Relation Age of Onset    Hypertension Mother     Diabetes Mother     Heart failure Mother     Angina Father         Review of Systems   Integumentary:  Negative for color change, rash, mole/lesion, breast mass, breast discharge and breast tenderness.   Breast: Negative for mass and tenderness     Physical Exam   Constitutional: She appears well-developed. She is cooperative.   HENT:   Head: Normocephalic.   Cardiovascular:  Normal rate and regular rhythm.            Pulmonary/Chest: She exhibits no tenderness and no bony tenderness. Right breast exhibits no mass (density in scar), no nipple discharge, no skin change and no tenderness. Left breast exhibits no mass (density in scar), no nipple discharge, no skin change and no tenderness.       Abdominal: Soft. Normal appearance.   Musculoskeletal: Lymphadenopathy:      Upper Body:      Right upper body: No supraclavicular or axillary adenopathy.      Left upper body: No supraclavicular or axillary adenopathy.     Neurological: She is alert.   Skin: No rash noted.        ASSESSMENT and PLAN of CARE    1. Malignant neoplasm of nipple of left breast in female, estrogen receptor positive  Assessment & Plan:   Patient is doing well and is being followed according to NCCN Guidelines. She understands that her imaging and exams have remained stable and is comfortable being followed in a conservative fashion. She understands the importance of monthly  self-breast examination and knows to report any and all changes as they occur.    She is status post Tamoxifen.        2. History of right breast cancer  Assessment & Plan:  Same as above.      Medical Decision Making:  It is my impression that this patient suffers all conditions contained in this medical document.  Each of these conditions did affect our plan of care and my medical decision making today.  It is my opinion that the medical decision making concerning this patient was of moderate difficulty based on the aforementioned conditions.  Any further recommendations will be communicated to the patient by me.  I have reviewed and verified her allergies, list of medications, medical and surgical histories, social history, and a pertinent review of symptoms.    Follow up:  6 months and prn    For:  PE and US with me        ADDENDUM: Her CXR and MGM are normal.

## 2023-04-06 ENCOUNTER — PATIENT MESSAGE (OUTPATIENT)
Dept: PRIMARY CARE CLINIC | Facility: CLINIC | Age: 73
End: 2023-04-06
Payer: MEDICARE

## 2023-04-06 ENCOUNTER — DOCUMENTATION ONLY (OUTPATIENT)
Dept: REHABILITATION | Facility: HOSPITAL | Age: 73
End: 2023-04-06
Payer: MEDICARE

## 2023-04-06 LAB
CEA SERPL-MCNC: <1.7 NG/ML (ref 0–5)
LDH SERPL L TO P-CCNC: 184 U/L (ref 110–260)

## 2023-04-06 RX ORDER — MONTELUKAST SODIUM 10 MG/1
10 TABLET ORAL NIGHTLY
Qty: 90 TABLET | Refills: 1 | Status: SHIPPED | OUTPATIENT
Start: 2023-04-06 | End: 2023-07-13

## 2023-04-06 NOTE — PROGRESS NOTES
My chart message sent to patient informing of her montelukast (SINGULAIR) 10 mg tablet  being sent to the pharmacy for her at a 90 day supply.

## 2023-04-06 NOTE — PROGRESS NOTES
HC went over the machines with the patient. Patient said she felt comfortable and is ready to start working out independently in the gym.

## 2023-04-07 LAB — CANCER AG27-29 SERPL-ACNC: 21.8 U/ML

## 2023-04-14 RX ORDER — LEVOTHYROXINE SODIUM 25 UG/1
TABLET ORAL
Qty: 30 TABLET | Refills: 5 | Status: SHIPPED | OUTPATIENT
Start: 2023-04-14 | End: 2023-10-13 | Stop reason: SDUPTHER

## 2023-04-18 ENCOUNTER — OFFICE VISIT (OUTPATIENT)
Dept: OPHTHALMOLOGY | Facility: CLINIC | Age: 73
End: 2023-04-18
Payer: MEDICARE

## 2023-04-18 DIAGNOSIS — H02.135 SENILE ECTROPION OF LEFT LOWER EYELID: ICD-10-CM

## 2023-04-18 DIAGNOSIS — Z96.1 PSEUDOPHAKIA OF BOTH EYES: Primary | ICD-10-CM

## 2023-04-18 DIAGNOSIS — H57.9 RIGHT EYE SYMPTOMS: ICD-10-CM

## 2023-04-18 PROCEDURE — 99204 PR OFFICE/OUTPT VISIT, NEW, LEVL IV, 45-59 MIN: ICD-10-PCS | Mod: S$PBB,,, | Performed by: STUDENT IN AN ORGANIZED HEALTH CARE EDUCATION/TRAINING PROGRAM

## 2023-04-18 PROCEDURE — 99213 OFFICE O/P EST LOW 20 MIN: CPT | Mod: PBBFAC | Performed by: STUDENT IN AN ORGANIZED HEALTH CARE EDUCATION/TRAINING PROGRAM

## 2023-04-18 PROCEDURE — 99204 OFFICE O/P NEW MOD 45 MIN: CPT | Mod: S$PBB,,, | Performed by: STUDENT IN AN ORGANIZED HEALTH CARE EDUCATION/TRAINING PROGRAM

## 2023-04-18 PROCEDURE — 99999 PR PBB SHADOW E&M-EST. PATIENT-LVL III: CPT | Mod: PBBFAC,,, | Performed by: STUDENT IN AN ORGANIZED HEALTH CARE EDUCATION/TRAINING PROGRAM

## 2023-04-18 PROCEDURE — 99999 PR PBB SHADOW E&M-EST. PATIENT-LVL III: ICD-10-PCS | Mod: PBBFAC,,, | Performed by: STUDENT IN AN ORGANIZED HEALTH CARE EDUCATION/TRAINING PROGRAM

## 2023-04-18 NOTE — PROGRESS NOTES
HPI     Eye Problem     Additional comments: Patient states she has been having some irritation   in her left eye for about 1 year. Patient states left eye mares and   itches sometimes. Patient states she has been using pred for about 1 year.   Patient states she has not seen any improvement.           Last edited by Meena Bradley on 4/18/2023  9:55 AM.            Assessment /Plan     For exam results, see Encounter Report.    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 +distraction. Poor snap back.Do not recommend surgery at this time as it is mild. If worsens, can consider surgeyr  ATs as needed    KENNETH OU:  - ATs QID and lid hygiene w/ baby shampoo  - [place] 3 Fish Oils    Pseudophakia of both eyes  monitor  Right eye symptoms  -     Ambulatory referral/consult to Ophthalmology      RTC PRN

## 2023-04-19 ENCOUNTER — TELEPHONE (OUTPATIENT)
Dept: PRIMARY CARE CLINIC | Facility: CLINIC | Age: 73
End: 2023-04-19
Payer: MEDICARE

## 2023-04-19 NOTE — TELEPHONE ENCOUNTER
Pt states that she saw Ophthalmologist yesterday and is doing better.  Advised to let us know if she needs anything.

## 2023-04-19 NOTE — TELEPHONE ENCOUNTER
----- Message from Roselia Gillette MD sent at 4/18/2023  5:36 PM CDT -----  Regarding: L eye irritation  Please call Ms. Delgadorer to see if she wants me to enter referral to [facility] or has someone of her own she prefers to f/u with (if [facility] prefers [facility] or [facility] or doesn't matter?)

## 2023-04-20 NOTE — ASSESSMENT & PLAN NOTE
Referral to [facility] Janette Rose - encourage to take advantage of Fitness Area once oriented - work on incorporating more enjoyable movement and healthy nutritional choices into daily routine

## 2023-04-27 ENCOUNTER — PATIENT MESSAGE (OUTPATIENT)
Dept: ADMINISTRATIVE | Facility: HOSPITAL | Age: 73
End: 2023-04-27
Payer: MEDICARE

## 2023-05-06 ENCOUNTER — PATIENT MESSAGE (OUTPATIENT)
Dept: ADMINISTRATIVE | Facility: HOSPITAL | Age: 73
End: 2023-05-06
Payer: MEDICARE

## 2023-06-12 ENCOUNTER — LAB VISIT (OUTPATIENT)
Dept: LAB | Facility: HOSPITAL | Age: 73
End: 2023-06-12
Attending: INTERNAL MEDICINE
Payer: MEDICARE

## 2023-06-12 DIAGNOSIS — K76.0 NAFLD (NONALCOHOLIC FATTY LIVER DISEASE): Primary | ICD-10-CM

## 2023-06-12 DIAGNOSIS — K76.0 NAFLD (NONALCOHOLIC FATTY LIVER DISEASE): ICD-10-CM

## 2023-06-12 LAB
ALBUMIN SERPL BCP-MCNC: 3.6 G/DL (ref 3.5–5.2)
ALP SERPL-CCNC: 92 U/L (ref 55–135)
ALT SERPL W/O P-5'-P-CCNC: 30 U/L (ref 10–44)
APTT PPP: 26.1 SEC (ref 21–32)
AST SERPL-CCNC: 25 U/L (ref 10–40)
BILIRUB DIRECT SERPL-MCNC: 0.1 MG/DL (ref 0.1–0.3)
BILIRUB SERPL-MCNC: 0.2 MG/DL (ref 0.1–1)
PROT SERPL-MCNC: 6.1 G/DL (ref 6–8.4)

## 2023-06-12 PROCEDURE — 85730 THROMBOPLASTIN TIME PARTIAL: CPT | Performed by: INTERNAL MEDICINE

## 2023-06-12 PROCEDURE — 80076 HEPATIC FUNCTION PANEL: CPT | Performed by: INTERNAL MEDICINE

## 2023-06-12 PROCEDURE — 36415 COLL VENOUS BLD VENIPUNCTURE: CPT | Mod: PO | Performed by: INTERNAL MEDICINE

## 2023-06-14 ENCOUNTER — PATIENT MESSAGE (OUTPATIENT)
Dept: PRIMARY CARE CLINIC | Facility: CLINIC | Age: 73
End: 2023-06-14
Payer: MEDICARE

## 2023-06-14 ENCOUNTER — OFFICE VISIT (OUTPATIENT)
Dept: RHEUMATOLOGY | Facility: CLINIC | Age: 73
End: 2023-06-14
Payer: MEDICARE

## 2023-06-14 VITALS
BODY MASS INDEX: 34.3 KG/M2 | HEIGHT: 63 IN | HEART RATE: 105 BPM | WEIGHT: 193.56 LBS | SYSTOLIC BLOOD PRESSURE: 109 MMHG | DIASTOLIC BLOOD PRESSURE: 64 MMHG

## 2023-06-14 DIAGNOSIS — M25.50 ARTHRALGIA, UNSPECIFIED JOINT: ICD-10-CM

## 2023-06-14 DIAGNOSIS — M15.9 PRIMARY OSTEOARTHRITIS INVOLVING MULTIPLE JOINTS: Primary | Chronic | ICD-10-CM

## 2023-06-14 PROCEDURE — 99214 OFFICE O/P EST MOD 30 MIN: CPT | Mod: S$PBB,,, | Performed by: STUDENT IN AN ORGANIZED HEALTH CARE EDUCATION/TRAINING PROGRAM

## 2023-06-14 PROCEDURE — 99999 PR PBB SHADOW E&M-EST. PATIENT-LVL IV: CPT | Mod: PBBFAC,,, | Performed by: STUDENT IN AN ORGANIZED HEALTH CARE EDUCATION/TRAINING PROGRAM

## 2023-06-14 PROCEDURE — 99999 PR PBB SHADOW E&M-EST. PATIENT-LVL IV: ICD-10-PCS | Mod: PBBFAC,,, | Performed by: STUDENT IN AN ORGANIZED HEALTH CARE EDUCATION/TRAINING PROGRAM

## 2023-06-14 PROCEDURE — 99214 OFFICE O/P EST MOD 30 MIN: CPT | Mod: PBBFAC | Performed by: STUDENT IN AN ORGANIZED HEALTH CARE EDUCATION/TRAINING PROGRAM

## 2023-06-14 PROCEDURE — 99214 PR OFFICE/OUTPT VISIT, EST, LEVL IV, 30-39 MIN: ICD-10-PCS | Mod: S$PBB,,, | Performed by: STUDENT IN AN ORGANIZED HEALTH CARE EDUCATION/TRAINING PROGRAM

## 2023-06-14 NOTE — PROGRESS NOTES
Pt has [facility] - if message below not viewed please call w information below:     Liver function back to normal and coag level normal    Please message or call with any questions or concerns!  Thank you!  Roselia Gillette MD, MPH  [facility]

## 2023-06-15 NOTE — PROGRESS NOTES
RHEUMATOLOGY CLINIC established patient VISIT    Reason for consult:- osteoarthritis    Chief complaints, HPI, ROS, EXAM, Assessment & Plans:-    Alison Lobo is a 72 y.o. pleasant female who presents to follow-up from her initial visit with us on  for osteoarthritis.  At that last visit we started her on meloxicam 7.5 mg daily.  She is also started taking turmeric as well.  She states that she is had very good relief of her symptoms.  Basically is pain-free today.  She is been working on weight loss but is struggling with this aspect.  Rheumatologic review of systems otherwise negative.  Physical exam is unremarkable.      Reviewed all available old and outside pertinent medical records.    All lab results personally reviewed and interpreted by me.    1. Primary osteoarthritis involving multiple joints    2. Arthralgia, unspecified joint        Problem List Items Addressed This Visit          Orthopedic    Primary osteoarthritis involving multiple joints - Primary (Chronic)     Other Visit Diagnoses       Arthralgia, unspecified joint                Patient following up today for treatment of osteoarthritis  She is had excellent relief with meloxicam 7.5 mg daily as well as turmeric supplements  Tolerating without side effects recommend continuing therapy  Encouraged to try to continue to lose weight  provided handout on Mediterranean diet    # Follow up in about 1 year (around 2024).    Chronic comorbid conditions affecting medical decision making today:    Past Medical History:   Diagnosis Date    Cancer     Hyperlipidemia        Past Surgical History:   Procedure Laterality Date    BREAST SURGERY       SECTION      HYSTERECTOMY          Social History     Tobacco Use    Smoking status: Never    Smokeless tobacco: Never   Substance Use Topics    Alcohol use: Never    Drug use: Never       Family History   Problem Relation Age of Onset    Hypertension Mother     Diabetes Mother      Heart failure Mother     Angina Father        Review of patient's allergies indicates:  No Known Allergies    Medication List with Changes/Refills   Current Medications    ATORVASTATIN (LIPITOR) 20 MG TABLET    TAKE 1 TABLET BY MOUTH DAILY    CETIRIZINE (ZYRTEC) 10 MG TABLET    Take 10 mg by mouth once daily.    FLUTICASONE PROPIONATE (FLONASE) 50 MCG/ACTUATION NASAL SPRAY    1 spray (50 mcg total) by Each Nostril route once daily.    LEVOTHYROXINE (SYNTHROID) 25 MCG TABLET    TAKE 1 TABLET(25 MCG) BY MOUTH BEFORE BREAKFAST    MELOXICAM (MOBIC) 7.5 MG TABLET    Take 2 tablets (15 mg total) by mouth once daily for 7 days, THEN 1 tablet (7.5 mg total) once daily.    METFORMIN (GLUCOPHAGE) 500 MG TABLET    Take 1,000 mg by mouth once daily. Takes 2.5 500mg QD    METOPROLOL SUCCINATE (TOPROL-XL) 50 MG 24 HR TABLET    Take 1 tablet (50 mg total) by mouth once daily.    MONTELUKAST (SINGULAIR) 10 MG TABLET    Take 1 tablet (10 mg total) by mouth every evening.    TAMOXIFEN (NOLVADEX) 20 MG TAB    Take 20 mg by mouth once daily.    TRAZODONE (DESYREL) 50 MG TABLET    Take 50 mg by mouth every evening.    VENLAFAXINE (EFFEXOR-XR) 150 MG CP24    Take 150 mg by mouth once daily. Takes 2 pills QD    VITAMIN D (VITAMIN D3) 1000 UNITS TAB    once daily.         Disclaimer: This note was prepared using voice recognition system and is likely to have sound alike errors and is not proofread.  Please message me with any questions.    31 minutes of total time spent on the encounter, which includes face to face time and non-face to face time preparing to see the patient (eg, review of tests), Obtaining and/or reviewing separately obtained history, Documenting clinical information in the electronic or other health record, Independently interpreting results (not separately reported) and communicating results to the patient/family/caregiver, or Care coordination (not separately reported).     Thank you for allowing me to participate in  the care of Alison Lobo.    Javi Bess MD

## 2023-06-20 ENCOUNTER — TELEPHONE (OUTPATIENT)
Dept: PRIMARY CARE CLINIC | Facility: CLINIC | Age: 73
End: 2023-06-20
Payer: MEDICARE

## 2023-06-21 ENCOUNTER — TELEPHONE (OUTPATIENT)
Dept: PRIMARY CARE CLINIC | Facility: CLINIC | Age: 73
End: 2023-06-21
Payer: MEDICARE

## 2023-07-13 RX ORDER — MONTELUKAST SODIUM 10 MG/1
TABLET ORAL
Qty: 90 TABLET | Refills: 1 | Status: SHIPPED | OUTPATIENT
Start: 2023-07-13 | End: 2023-10-13 | Stop reason: SDUPTHER

## 2023-08-03 ENCOUNTER — PATIENT MESSAGE (OUTPATIENT)
Dept: PRIMARY CARE CLINIC | Facility: CLINIC | Age: 73
End: 2023-08-03
Payer: MEDICARE

## 2023-08-03 DIAGNOSIS — R73.03 PREDIABETES: Primary | Chronic | ICD-10-CM

## 2023-08-03 DIAGNOSIS — T75.3XXA MOTION SICKNESS, INITIAL ENCOUNTER: ICD-10-CM

## 2023-08-03 RX ORDER — SCOLOPAMINE TRANSDERMAL SYSTEM 1 MG/1
1 PATCH, EXTENDED RELEASE TRANSDERMAL
Qty: 2 PATCH | Refills: 0 | Status: SHIPPED | OUTPATIENT
Start: 2023-08-03 | End: 2023-08-03

## 2023-08-03 RX ORDER — SCOLOPAMINE TRANSDERMAL SYSTEM 1 MG/1
1 PATCH, EXTENDED RELEASE TRANSDERMAL
Qty: 4 PATCH | Refills: 0 | Status: SHIPPED | OUTPATIENT
Start: 2023-08-03 | End: 2023-08-15

## 2023-08-03 RX ORDER — METFORMIN HYDROCHLORIDE 500 MG/1
1250 TABLET ORAL DAILY
Qty: 225 TABLET | Refills: 1 | Status: SHIPPED | OUTPATIENT
Start: 2023-08-03 | End: 2024-01-26

## 2023-08-04 ENCOUNTER — PATIENT MESSAGE (OUTPATIENT)
Dept: PRIMARY CARE CLINIC | Facility: CLINIC | Age: 73
End: 2023-08-04
Payer: MEDICARE

## 2023-08-29 RX ORDER — METOPROLOL SUCCINATE 50 MG/1
50 TABLET, EXTENDED RELEASE ORAL
Qty: 30 TABLET | Refills: 11 | Status: SHIPPED | OUTPATIENT
Start: 2023-08-29

## 2023-09-25 PROBLEM — C50.411 MALIGNANT NEOPLASM OF UPPER-OUTER QUADRANT OF RIGHT BREAST IN FEMALE, ESTROGEN RECEPTOR POSITIVE: Status: ACTIVE | Noted: 2023-09-25

## 2023-09-25 PROBLEM — Z17.0 MALIGNANT NEOPLASM OF UPPER-OUTER QUADRANT OF RIGHT BREAST IN FEMALE, ESTROGEN RECEPTOR POSITIVE: Status: ACTIVE | Noted: 2023-09-25

## 2023-09-25 NOTE — PROGRESS NOTES
[facility]  Ishan Whatley MD, FACS  Cailin Grier NP-MARLEEN      Date of Service: 10/9/2023    Chief Complaint:   Alison Lobo is a 73 y.o. female presenting today for her 6-month evaluation given her previous diagnosis of breast cancer. She is due for an ultrasound.  She reports no interval changes.     History of Present Illness:   Mrs. Lobo presents on 2023 to establish care.  She has a history of bilateral breast cancer.  Her right breast cancer was diagnosed in 2022 and she is s/p Lumpectomy and Radiation.  She was placed on Arimidex but did not tolerate this.  She then started Tamoxifen and should remain on this until .  Her left breast cancer diagnosis was in 2018. She elected lumpectomy and radiation.  She completed 5 years of Tamoxifen.    Past Medical History:   Diagnosis Date    Cancer     Hyperlipidemia     Malignant neoplasm of upper-outer quadrant of right breast in female, estrogen receptor positive 2023      Past Surgical History:   Procedure Laterality Date    BREAST SURGERY       SECTION      HYSTERECTOMY          Current Outpatient Medications:     cetirizine (ZYRTEC) 10 MG tablet, Take 10 mg by mouth once daily., Disp: , Rfl:     levothyroxine (SYNTHROID) 25 MCG tablet, TAKE 1 TABLET(25 MCG) BY MOUTH BEFORE BREAKFAST, Disp: 30 tablet, Rfl: 5    meloxicam (MOBIC) 7.5 MG tablet, Take 2 tablets (15 mg total) by mouth once daily for 7 days, THEN 1 tablet (7.5 mg total) once daily., Disp: 374 tablet, Rfl: 0    metFORMIN (GLUCOPHAGE) 500 MG tablet, Take 2.5 tablets (1,250 mg total) by mouth once daily. Takes 2.5 500mg QD, Disp: 225 tablet, Rfl: 1    metoprolol succinate (TOPROL-XL) 50 MG 24 hr tablet, TAKE 1 TABLET(50 MG) BY MOUTH EVERY DAY, Disp: 30 tablet, Rfl: 11    montelukast (SINGULAIR) 10 mg tablet, TAKE 1 TABLET(10 MG) BY MOUTH EVERY EVENING, Disp: 90 tablet, Rfl: 1    tamoxifen (NOLVADEX) 20 MG Tab, Take 20  mg by mouth once daily., Disp: , Rfl:     traZODone (DESYREL) 50 MG tablet, Take 50 mg by mouth every evening., Disp: , Rfl:     venlafaxine (EFFEXOR-XR) 150 MG Cp24, Take 150 mg by mouth once daily. Takes 2 pills QD, Disp: , Rfl:     vitamin D (VITAMIN D3) 1000 units Tab, once daily., Disp: , Rfl:     atorvastatin (LIPITOR) 20 MG tablet, TAKE 1 TABLET BY MOUTH DAILY, Disp: 90 tablet, Rfl: 1   Review of patient's allergies indicates:  No Known Allergies   Social History     Tobacco Use    Smoking status: Never    Smokeless tobacco: Never   Substance Use Topics    Alcohol use: Never      Family History   Problem Relation Age of Onset    Hypertension Mother     Diabetes Mother     Heart failure Mother     Angina Father         Review of Systems   Integumentary:  Negative for color change, rash, mole/lesion, breast mass, breast discharge and breast tenderness.   Breast: Negative for mass and tenderness       Physical Exam   Constitutional: She is cooperative.   HENT:   Head: Normocephalic.   Pulmonary/Chest: She exhibits no mass. Right breast exhibits no inverted nipple, no mass, no nipple discharge, no skin change and no tenderness. Left breast exhibits no inverted nipple, no mass, no nipple discharge, no skin change and no tenderness.   Abdominal: Normal appearance.   Musculoskeletal: Lymphadenopathy:      Upper Body:      Right upper body: No supraclavicular or axillary adenopathy.      Left upper body: No supraclavicular or axillary adenopathy.     Neurological: She is alert.   Skin: No rash noted.        ULTRASOUND EVALUATION and REPORT    Bilateral real-time Ultrasound was performed by me.  All four quadrants of the breast, the subareolar and axillary basins were scanned.    She has some heterogeneous dense fibroglandular tissue bilaterally.  We discussed  this dense tissue and its potential implications.    Right Breast: She has normal tissues in the right breast; there's no disruption of the tissue planes  (except at the site of her scar(s)) and no abnormal shadowing; she has normal skin thickness with no subcutaneous nodules of skin thickening; NEM     Left Breast: She has normal tissues in the left breast; there's no disruption of the tissue planes except at the site of her scar(s)) and no abnormal shadowing; she has normal skin thickness with no subcutaneous nodules or skin thickening; NEM     Axillae: There are no abnormal lymph nodes seen bilaterally.     Impression: Some dense but normal tissue bilaterally with no solid/suspicious masses noted. No LAD in bilateral axillae.      BIRADS: Category 2 - Benign Finding.    Findings were discussed with patient in real time and a hand written report was given to her at the conclusion of the exam.        ASSESSMENT and PLAN OF CARE     1. Malignant neoplasm of upper-outer quadrant of right breast in female, estrogen receptor positive  Assessment & Plan:  Patient is doing well and is being followed according to NCCN Guidelines. She understands that her imaging and exams have remained stable and is comfortable being followed in a conservative fashion. She understands the importance of monthly self-breast examination and knows to report any and all changes as they occur.    NOTE:::We viewed her films together at today's visit.  We discussed the multiple views obtained and the important findings.  Even benign changes were mentioned and her questions were answered.  She knows that she may receive a formal letter or report from the Radiologist.  She is to contact us if she has questions.    She should continue her Tamoxifen as directed.  We discussed some of the side effects and methods to combat these.  Should she have any issues, she can contact us as needed.    Labs obtained today: CBC, Chem 12, CEA, LDH, CA27/29 - after resulted, these will be compared to her previous values and all abnormal values will be phoned to her for discussion.      Orders:  -     Lactate  Dehydrogenase  -     Comprehensive Metabolic Panel  -     CEA  -     CBC Auto Differential  -     Cancer Antigen 27-29    Medical Decision Making: HT - It is my impression that the patient suffers from all the listed conditions.  Each of these conditions did affect my Plan of Care and all medical decisions that were rendered.  The medical decision making was of high difficulty based on her comorbidities and her previous diagnosis of breast cancer, which can pose a direct threat to her life.  This patient is currently being treated with ongoing Hormonal Agents that pose inherent risks. Laboratory evaluations are currently pending but will be reviewed in the next 24 hours. Any further recommendations will be communicated to the patient by me.  I have reviewed and verified her allergies, list of medications, medical and surgical histories, social history, and a pertinent review of symptoms.      Follow up: 6 months and prn     For:  Physical Examination, MGM (D) at , CXR, and LABS

## 2023-10-09 ENCOUNTER — OFFICE VISIT (OUTPATIENT)
Dept: SURGERY | Facility: CLINIC | Age: 73
End: 2023-10-09
Payer: MEDICARE

## 2023-10-09 DIAGNOSIS — Z17.0 MALIGNANT NEOPLASM OF UPPER-OUTER QUADRANT OF RIGHT BREAST IN FEMALE, ESTROGEN RECEPTOR POSITIVE: ICD-10-CM

## 2023-10-09 DIAGNOSIS — C50.411 MALIGNANT NEOPLASM OF UPPER-OUTER QUADRANT OF RIGHT BREAST IN FEMALE, ESTROGEN RECEPTOR POSITIVE: ICD-10-CM

## 2023-10-09 PROCEDURE — 99213 OFFICE O/P EST LOW 20 MIN: CPT | Mod: PBBFAC,PN | Performed by: SPECIALIST

## 2023-10-09 PROCEDURE — 82378 CARCINOEMBRYONIC ANTIGEN: CPT | Performed by: SPECIALIST

## 2023-10-09 PROCEDURE — 99999 PR PBB SHADOW E&M-EST. PATIENT-LVL III: CPT | Mod: PBBFAC,,, | Performed by: SPECIALIST

## 2023-10-09 PROCEDURE — 85025 COMPLETE CBC W/AUTO DIFF WBC: CPT | Performed by: SPECIALIST

## 2023-10-09 PROCEDURE — 99999 PR PBB SHADOW E&M-EST. PATIENT-LVL III: ICD-10-PCS | Mod: PBBFAC,,, | Performed by: SPECIALIST

## 2023-10-09 PROCEDURE — 80053 COMPREHEN METABOLIC PANEL: CPT | Performed by: SPECIALIST

## 2023-10-09 PROCEDURE — 99214 PR OFFICE/OUTPT VISIT, EST, LEVL IV, 30-39 MIN: ICD-10-PCS | Mod: S$PBB,,, | Performed by: SPECIALIST

## 2023-10-09 PROCEDURE — 99214 OFFICE O/P EST MOD 30 MIN: CPT | Mod: S$PBB,,, | Performed by: SPECIALIST

## 2023-10-09 PROCEDURE — 83615 LACTATE (LD) (LDH) ENZYME: CPT | Performed by: SPECIALIST

## 2023-10-10 LAB
ALBUMIN SERPL BCP-MCNC: 3.8 G/DL (ref 3.5–5.2)
ALP SERPL-CCNC: 70 U/L (ref 55–135)
ALT SERPL W/O P-5'-P-CCNC: 47 U/L (ref 10–44)
ANION GAP SERPL CALC-SCNC: 13 MMOL/L (ref 8–16)
AST SERPL-CCNC: 53 U/L (ref 10–40)
BASOPHILS # BLD AUTO: 0.05 K/UL (ref 0–0.2)
BASOPHILS NFR BLD: 0.9 % (ref 0–1.9)
BILIRUB SERPL-MCNC: 0.2 MG/DL (ref 0.1–1)
BUN SERPL-MCNC: 19 MG/DL (ref 8–23)
CALCIUM SERPL-MCNC: 9.8 MG/DL (ref 8.7–10.5)
CEA SERPL-MCNC: 1.9 NG/ML (ref 0–5)
CHLORIDE SERPL-SCNC: 103 MMOL/L (ref 95–110)
CO2 SERPL-SCNC: 23 MMOL/L (ref 23–29)
CREAT SERPL-MCNC: 0.6 MG/DL (ref 0.5–1.4)
DIFFERENTIAL METHOD: ABNORMAL
EOSINOPHIL # BLD AUTO: 0 K/UL (ref 0–0.5)
EOSINOPHIL NFR BLD: 0.3 % (ref 0–8)
ERYTHROCYTE [DISTWIDTH] IN BLOOD BY AUTOMATED COUNT: 12.3 % (ref 11.5–14.5)
EST. GFR  (NO RACE VARIABLE): >60 ML/MIN/1.73 M^2
GLUCOSE SERPL-MCNC: 97 MG/DL (ref 70–110)
HCT VFR BLD AUTO: 42.1 % (ref 37–48.5)
HGB BLD-MCNC: 13.6 G/DL (ref 12–16)
IMM GRANULOCYTES # BLD AUTO: 0.02 K/UL (ref 0–0.04)
IMM GRANULOCYTES NFR BLD AUTO: 0.3 % (ref 0–0.5)
LDH SERPL L TO P-CCNC: 175 U/L (ref 110–260)
LYMPHOCYTES # BLD AUTO: 2 K/UL (ref 1–4.8)
LYMPHOCYTES NFR BLD: 35.1 % (ref 18–48)
MCH RBC QN AUTO: 31.6 PG (ref 27–31)
MCHC RBC AUTO-ENTMCNC: 32.3 G/DL (ref 32–36)
MCV RBC AUTO: 98 FL (ref 82–98)
MONOCYTES # BLD AUTO: 0.7 K/UL (ref 0.3–1)
MONOCYTES NFR BLD: 12.8 % (ref 4–15)
NEUTROPHILS # BLD AUTO: 2.9 K/UL (ref 1.8–7.7)
NEUTROPHILS NFR BLD: 50.6 % (ref 38–73)
NRBC BLD-RTO: 0 /100 WBC
PLATELET # BLD AUTO: 265 K/UL (ref 150–450)
PMV BLD AUTO: 11.1 FL (ref 9.2–12.9)
POTASSIUM SERPL-SCNC: 4.4 MMOL/L (ref 3.5–5.1)
PROT SERPL-MCNC: 6.9 G/DL (ref 6–8.4)
RBC # BLD AUTO: 4.3 M/UL (ref 4–5.4)
SODIUM SERPL-SCNC: 139 MMOL/L (ref 136–145)
WBC # BLD AUTO: 5.78 K/UL (ref 3.9–12.7)

## 2023-10-13 LAB — CANCER AG27-29 SERPL-ACNC: 21.3 U/ML

## 2023-10-13 RX ORDER — LEVOTHYROXINE SODIUM 25 UG/1
25 TABLET ORAL
Qty: 30 TABLET | Refills: 5 | Status: SHIPPED | OUTPATIENT
Start: 2023-10-13

## 2023-10-13 RX ORDER — MONTELUKAST SODIUM 10 MG/1
10 TABLET ORAL NIGHTLY
Qty: 90 TABLET | Refills: 1 | Status: SHIPPED | OUTPATIENT
Start: 2023-10-13

## 2023-10-18 ENCOUNTER — TELEPHONE (OUTPATIENT)
Dept: PRIMARY CARE CLINIC | Facility: CLINIC | Age: 73
End: 2023-10-18
Payer: MEDICARE

## 2023-10-20 ENCOUNTER — OFFICE VISIT (OUTPATIENT)
Dept: PRIMARY CARE CLINIC | Facility: CLINIC | Age: 73
End: 2023-10-20
Payer: MEDICARE

## 2023-10-20 ENCOUNTER — DOCUMENTATION ONLY (OUTPATIENT)
Dept: PRIMARY CARE CLINIC | Facility: CLINIC | Age: 73
End: 2023-10-20
Payer: MEDICARE

## 2023-10-20 VITALS
BODY MASS INDEX: 33.9 KG/M2 | WEIGHT: 191.31 LBS | TEMPERATURE: 99 F | HEIGHT: 63 IN | HEART RATE: 93 BPM | OXYGEN SATURATION: 96 % | DIASTOLIC BLOOD PRESSURE: 60 MMHG | SYSTOLIC BLOOD PRESSURE: 122 MMHG

## 2023-10-20 DIAGNOSIS — E66.09 CLASS 1 OBESITY DUE TO EXCESS CALORIES WITH BODY MASS INDEX (BMI) OF 33.0 TO 33.9 IN ADULT, UNSPECIFIED WHETHER SERIOUS COMORBIDITY PRESENT: ICD-10-CM

## 2023-10-20 DIAGNOSIS — F41.1 GAD (GENERALIZED ANXIETY DISORDER): Chronic | ICD-10-CM

## 2023-10-20 DIAGNOSIS — R74.01 TRANSAMINITIS: ICD-10-CM

## 2023-10-20 DIAGNOSIS — R45.84 ANHEDONIA: Primary | ICD-10-CM

## 2023-10-20 DIAGNOSIS — F33.1 MODERATE EPISODE OF RECURRENT MAJOR DEPRESSIVE DISORDER: ICD-10-CM

## 2023-10-20 DIAGNOSIS — E03.9 ACQUIRED HYPOTHYROIDISM: Chronic | ICD-10-CM

## 2023-10-20 DIAGNOSIS — E78.5 HYPERLIPIDEMIA, UNSPECIFIED HYPERLIPIDEMIA TYPE: Chronic | ICD-10-CM

## 2023-10-20 DIAGNOSIS — G57.63 MORTON'S NEUROMA OF BOTH FEET: Chronic | ICD-10-CM

## 2023-10-20 DIAGNOSIS — E83.42 HYPOMAGNESEMIA: ICD-10-CM

## 2023-10-20 DIAGNOSIS — R73.03 PREDIABETES: ICD-10-CM

## 2023-10-20 PROBLEM — H65.91: Status: RESOLVED | Noted: 2022-12-21 | Resolved: 2023-10-20

## 2023-10-20 PROCEDURE — 99214 OFFICE O/P EST MOD 30 MIN: CPT | Mod: PBBFAC,PN | Performed by: INTERNAL MEDICINE

## 2023-10-20 PROCEDURE — 99215 PR OFFICE/OUTPT VISIT, EST, LEVL V, 40-54 MIN: ICD-10-PCS | Mod: S$PBB,,, | Performed by: INTERNAL MEDICINE

## 2023-10-20 PROCEDURE — 99999 PR PBB SHADOW E&M-EST. PATIENT-LVL IV: ICD-10-PCS | Mod: PBBFAC,,, | Performed by: INTERNAL MEDICINE

## 2023-10-20 PROCEDURE — 99999 PR PBB SHADOW E&M-EST. PATIENT-LVL IV: CPT | Mod: PBBFAC,,, | Performed by: INTERNAL MEDICINE

## 2023-10-20 PROCEDURE — 99215 OFFICE O/P EST HI 40 MIN: CPT | Mod: S$PBB,,, | Performed by: INTERNAL MEDICINE

## 2023-10-20 RX ORDER — BUPROPION HYDROCHLORIDE 150 MG/1
150 TABLET ORAL EVERY MORNING
COMMUNITY
Start: 2023-10-09

## 2023-10-20 RX ORDER — ATORVASTATIN CALCIUM 20 MG/1
20 TABLET, FILM COATED ORAL DAILY
Qty: 90 TABLET | Refills: 1 | Status: SHIPPED | OUTPATIENT
Start: 2023-10-20 | End: 2024-01-08

## 2023-10-20 NOTE — PATIENT INSTRUCTIONS
If you are feeling unwell, we'd like to be the first ones to know here at [facility]! Please give us a call. Same day appointments are our top priority to keep you well and out of the emergency rooms and hospitals. Call [phone] for our direct line. After hours advice is always available. Please call [phone] after hours to speak to the on-call team.      Recommend new CoVid vaccine about 4 mos after last received (Dec 2023?)

## 2023-10-20 NOTE — PROGRESS NOTES
"Alison Lobo  10/20/2023  [id number]    Roselia Gillette MD  Patient Care Team:  Roselia Gillette MD as PCP - General (Internal Medicine)  Clarissa Stephenson NP as Nurse Practitioner (Family Medicine)  Ishan Whatley MD as Consulting Physician (Breast Surgery)    Visit Type: Follow-up    Chief Complaint:  Chief Complaint   Patient presents with    Follow-up     Wants to talk about last lab results and  COVID shot       History of Present Illness: Ms. Alison Lobo is a 73 year old female here for scheduled f/u.     Medical issues include prediabetes, B Chun's neuroma/foot pain, JADA (CPAP), obesity, R breast cancer 2002, h/o L breast cancer 2018 (both small invasive ductal carcinoma), recurrent MDD, MEHNAZ, HLD, hypothyroid.     Not exercising but has been doing yardwork  Was using pool at [facility] but it's been closed    Still w fairly sig anxiety, anhedonia  Saw Psychiatrist @ [facility] last week added Wellbutrin  Psychiatrist ordered sleep study which was completed and now using CPAP  Unsure who will be monitoring CPAP going forward (Psychiatrist? Sleep center?)    Report R ear fullness has resolved  Reports allergy symptoms less bothersome recently  Denies sig GI issues    Cont issues w Adiel's neuroma - moving from toe into forefoot  Has special shoe inserts and has seen Podiatry [facility] a couple of times for this    PHQ-4 Score: 9     From last visit w me 3/20/23  Started feeling bad yesterday - cough, cold symptoms  Similar "bout" about 2 weeks ago  Can develop bronchitis   POCT CoVid/Flu negative in clinic here today  Gave up diet coke, cut back coffee to 1/2 cup in am, added oatmeal  Didn't feel she had time to follow through on PT at the [facility] - would prefer [facility] and work w [employer] in [employer]    Recent appointments:   10/09/23 Breast Surg Ph[facility]ia   She has a history of bilateral breast cancer.  Her right breast cancer was diagnosed in February 2022 and she is s/p Lumpectomy and Radiation.  She " was placed on Arimidex but did not tolerate this.  She then started Tamoxifen and should remain on this until 2028.  Her left breast cancer diagnosis was in March 2018. She elected lumpectomy and radiation.  She completed 5 years of Tamoxifen.  6/14/23 Rheum Hille OA  4/18/23 Ophthal Kp  4/06/23 [facility]  4/05/23 Breast Surg Phylicia   [facility]  3/27/23 [facility]  3/20/23 [facility]  3/08/23 [facility] EAWV    Upcoming appointments:  Future Appointments       Date Provider Specialty Appt Notes    10/30/2023 Madison Griffith LCSW Primary Care Initial LCSW Visit    12/8/2023 Osmani Reyes MD Cardiology 1 year f/u    1/16/2024  Lab Transaminitis [R74.01]    1/19/2024 Roselia Gillette MD Primary Care 3 month f/u    4/22/2024 Ishan Whatley MD Breast Surgery MGM, CXR           The following were reviewed: Active problem list, medication list, allergies, family history, social history, and Health Maintenance.     Medications have been reviewed and reconciled with patient at visit today.    Review of Systems   See HPI above    Exam: sats 98%  Vitals:    10/20/23 1306   BP: 122/60   Pulse: 93   Temp: 98.5 °F (36.9 °C)     Weight: 86.8 kg (191 lb 4.8 oz)   Body mass index is 33.89 kg/m².    BP Readings from Last 3 Encounters:   10/20/23 122/60   06/14/23 109/64   03/20/23 138/61      Wt Readings from Last 3 Encounters:   10/20/23 1306 86.8 kg (191 lb 4.8 oz)   06/14/23 1131 87.8 kg (193 lb 9 oz)   04/05/23 1254 87.2 kg (192 lb 3.9 oz)      Physical Exam  Vitals reviewed.   Constitutional:       General: She is not in acute distress.     Appearance: Normal appearance. She is obese. She is not ill-appearing.   HENT:      Head: Normocephalic and atraumatic.      Right Ear: Tympanic membrane, ear canal and external ear normal.      Left Ear: Tympanic membrane, ear canal and external ear normal.      Nose: Nose normal.      Mouth/Throat:      Mouth: Mucous membranes  "are moist.      Pharynx: Oropharynx is clear.   Eyes:      General: No scleral icterus.     Extraocular Movements: Extraocular movements intact.      Conjunctiva/sclera: Conjunctivae normal.   Neck:      Vascular: No carotid bruit.   Cardiovascular:      Rate and Rhythm: Normal rate and regular rhythm.      Heart sounds: No murmur heard.  Pulmonary:      Effort: Pulmonary effort is normal. No respiratory distress.      Breath sounds: Normal breath sounds. No wheezing, rhonchi or rales.   Abdominal:      General: Bowel sounds are normal.      Palpations: Abdomen is soft.      Tenderness: There is no guarding.      Comments: Mild mid lower abdominal discomfort on palpation   Musculoskeletal:      Right lower leg: No edema.      Left lower leg: No edema.   Lymphadenopathy:      Cervical: No cervical adenopathy.   Skin:     General: Skin is warm and dry.   Neurological:      General: No focal deficit present.      Mental Status: She is alert and oriented to person, place, and time. Mental status is at baseline.      Motor: No weakness.      Gait: Gait normal.   Psychiatric:         Behavior: Behavior normal.         Thought Content: Thought content normal.         Judgment: Judgment normal.     Laboratory Reviewed  Lab Results   Component Value Date    WBC 5.78 10/09/2023    HGB 13.6 10/09/2023    HCT 42.1 10/09/2023     10/09/2023    MCV 98 10/09/2023    CHOL 138 11/07/2022    TRIG 150 11/07/2022    HDL 41 11/07/2022    LDLCALC 67.0 11/07/2022    ALT 47 (H) 10/09/2023    AST 53 (H) 10/09/2023     10/09/2023    K 4.4 10/09/2023     10/09/2023    CREATININE 0.6 10/09/2023    BUN 19 10/09/2023    CO2 23 10/09/2023    MG 1.9 11/07/2022    TSH 1.849 11/17/2022    HGBA1C 5.9 (H) 03/08/2023    CRP 1.1 12/15/2022     Lab Results   Component Value Date    CALCIUM 9.8 10/09/2023    PHOS 3.3 11/07/2022    No results found for: "CQLSXDYX03"No results found for: "FOLATE" No results found for: "UIBC", "IRON", " ""TRANS", "TRANSFERRIN", "TIBC", "LABIRON", "FESATURATED"   Lab Results   Component Value Date    EGFRNORACEVR >60.0 10/09/2023    ALBUMIN 3.8 10/09/2023     Lab Results   Component Value Date    ADGUJTON51RA 58 11/07/2022      10/09/23 CEA wnl CA 27-29 wnl    B Breast ULTRASOUND EVALUATION and REPORT 10/09/2  Impression: Some dense but normal tissue bilaterally with no solid/suspicious masses noted. No LAD in bilateral axillae.    BIRADS: Category 2 - Benign Finding.    Assessment:   73 y.o. female with multiple co-morbid illnesses here for continued follow up of medical problems.      The primary encounter diagnosis was Anhedonia. Diagnoses of MEHNAZ (generalized anxiety disorder), Transaminitis, Hypomagnesemia, Hyperlipidemia, unspecified hyperlipidemia type, Prediabetes, Class 1 obesity due to excess calories with body mass index (BMI) of 33.0 to 33.9 in adult, unspecified whether serious comorbidity present, Moderate episode of recurrent major depressive disorder, and Chun's neuroma of both feet were also pertinent to this visit.      Plan:   1. Anhedonia  -     Ambulatory referral/consult to Value Based Primary Care Behavioral Health; Future; Expected date: 10/27/2023    2. MEHNAZ (generalized anxiety disorder)  -     Ambulatory referral/consult to Value Based Primary Care Behavioral Health; Future; Expected date: 10/27/2023    3. Transaminitis  -     Comprehensive Metabolic Panel; Future; Expected date: 10/20/2023  -     E-Consult to Gastroenterology    4. Hypomagnesemia  -     Magnesium; Future; Expected date: 10/20/2023    5. Hyperlipidemia, unspecified hyperlipidemia type  -     Lipid Panel; Future; Expected date: 10/20/2023  -     E-Consult to Gastroenterology    6. Prediabetes  -     Hemoglobin A1C; Future; Expected date: 10/20/2023    7. Class 1 obesity due to excess calories with body mass index (BMI) of 33.0 to 33.9 in adult, unspecified whether serious comorbidity present  -     E-Consult to " Gastroenterology    8. Moderate episode of recurrent major depressive disorder  Assessment & Plan:  Followed by external Psychiatrist - Wellbutrin recently added to current Rx - encourage resume daily physical activity and cont f/u w Psychiatry      9. Chun's neuroma of both feet  Assessment & Plan:  Cont w ice, other home treatments - consider f/u again w Podiatry if no improvement      Other orders  -     atorvastatin (LIPITOR) 20 MG tablet; Take 1 tablet (20 mg total) by mouth once daily.  Dispense: 90 tablet; Refill: 1       Health Maintenance         Date Due Completion Date    Hepatitis C Screening Never done ---    COVID-19 Vaccine (7 - 2023-24 season) 10/01/2023 8/6/2023    Hemoglobin A1c (Prediabetes) 03/08/2024 3/8/2023    Colorectal Cancer Screening 09/13/2025 9/13/2022    DEXA Scan 05/12/2027 5/12/2022    Lipid Panel 11/07/2027 11/7/2022    TETANUS VACCINE 05/09/2029 5/9/2019          -Patient's lab results were reviewed and discussed with patient  -Treatment options and alternatives were discussed with the patient. Patient expressed understanding. Patient was given the opportunity to ask questions and be an active participant in their medical care. Patient had no further questions or concerns at this time.   -Patient is an overall moderate risk for health complications from their medical conditions.     Follow up: Follow up in about 3 months (around 1/20/2024) for Follow Up.    After visit summary printed and given to patient upon discharge.  Patient care plan included in After visit summary.    TOTAL TIME evaluating and managing this patient for this encounter was greater than 60 minutes. This time was spent personally by me on some of the following activities: review of patient's past medical history, assessing age-appropriate health maintenance needs, review of any interval history, review and interpretation of lab results, review and interpretation of imaging test results, review and  interpretation of cardiology test results, reviewing consulting specialist notes, obtaining history from the patient and family, examination of the patient, medication reconciliation, managing and/or ordering prescription medications, ordering imaging tests, ordering referral to subspecialty provider(s), educating patient and answering their questions about diagnosis, treatment plan, and goals of treatment, discussing planned follow-up and final documentation of the visit. This time was exclusive of any separately billable procedures for this patient and exclusive of time spent treating any other patients.

## 2023-10-20 NOTE — ASSESSMENT & PLAN NOTE
Followed by external Psychiatrist - Wellbutrin recently added to current Rx - encourage resume daily physical activity and cont f/u w Psychiatry

## 2023-10-22 DIAGNOSIS — E03.9 ACQUIRED HYPOTHYROIDISM: Primary | Chronic | ICD-10-CM

## 2023-10-22 NOTE — ASSESSMENT & PLAN NOTE
Did not f/u w [facility] as planned - no sig change in weight - she does plan to re-establish w [facility] LCSW - encourage increase daily physical activity, healthier food and beverage choices

## 2023-10-23 ENCOUNTER — E-CONSULT (OUTPATIENT)
Dept: FAMILY MEDICINE | Facility: CLINIC | Age: 73
End: 2023-10-23
Payer: MEDICARE

## 2023-10-23 ENCOUNTER — E-CONSULT (OUTPATIENT)
Dept: GASTROENTEROLOGY | Facility: CLINIC | Age: 73
End: 2023-10-23
Payer: MEDICARE

## 2023-10-23 DIAGNOSIS — R79.89 ELEVATED LFTS: Primary | ICD-10-CM

## 2023-10-23 DIAGNOSIS — K76.0 NAFLD (NONALCOHOLIC FATTY LIVER DISEASE): ICD-10-CM

## 2023-10-23 DIAGNOSIS — R74.8 ELEVATED LIVER ENZYMES: Primary | ICD-10-CM

## 2023-10-23 PROCEDURE — 99499 NO LOS: ICD-10-PCS | Mod: S$PBB,,, | Performed by: FAMILY MEDICINE

## 2023-10-23 PROCEDURE — 99499 UNLISTED E&M SERVICE: CPT | Mod: S$PBB,,, | Performed by: INTERNAL MEDICINE

## 2023-10-23 PROCEDURE — 99499 UNLISTED E&M SERVICE: CPT | Mod: S$PBB,,, | Performed by: FAMILY MEDICINE

## 2023-10-23 PROCEDURE — 99499 NO LOS: ICD-10-PCS | Mod: S$PBB,,, | Performed by: INTERNAL MEDICINE

## 2023-10-23 NOTE — CONSULTS
[facility]  Response for E-Consult     Patient Name: Alison Lobo  MRN: [id number]  Primary Care Provider: Roselia Gillette MD   Requesting Provider: Roselia Gillette MD  Consults    Recommendation: Please re-route consult to hepatology      Total time of Consultation: 5 minute    I did not speak to the requesting provider verbally about this.     *This eConsult is based on the clinical data available to me and is furnished without benefit of a physical examination. The eConsult will need to be interpreted in light of any clinical issues or changes in patient status not available to me at the time of filing this eConsults. Significant changes in patient condition or level of acuity should result in immediate formal consultation and reevaluation. Please alert me if you have further questions.    Thank you for this eConsult referral.     Angelique Connolly MD  [facility]

## 2023-10-24 ENCOUNTER — PATIENT MESSAGE (OUTPATIENT)
Dept: PRIMARY CARE CLINIC | Facility: CLINIC | Age: 73
End: 2023-10-24

## 2023-10-24 ENCOUNTER — OFFICE VISIT (OUTPATIENT)
Dept: PRIMARY CARE CLINIC | Facility: CLINIC | Age: 73
End: 2023-10-24
Payer: MEDICARE

## 2023-10-24 VITALS
SYSTOLIC BLOOD PRESSURE: 136 MMHG | HEIGHT: 63 IN | BODY MASS INDEX: 33.84 KG/M2 | DIASTOLIC BLOOD PRESSURE: 66 MMHG | HEART RATE: 96 BPM | WEIGHT: 191 LBS | OXYGEN SATURATION: 97 % | TEMPERATURE: 98 F

## 2023-10-24 DIAGNOSIS — I95.1 ORTHOSTASIS: ICD-10-CM

## 2023-10-24 DIAGNOSIS — J30.2 SEASONAL ALLERGIC RHINITIS, UNSPECIFIED TRIGGER: Chronic | ICD-10-CM

## 2023-10-24 DIAGNOSIS — R26.89 BALANCE PROBLEM: Primary | ICD-10-CM
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
## 2023-10-24 PROCEDURE — 99999 PR PBB SHADOW E&M-EST. PATIENT-LVL V: ICD-10-PCS | Mod: PBBFAC,,, | Performed by: INTERNAL MEDICINE

## 2023-10-24 PROCEDURE — 99215 OFFICE O/P EST HI 40 MIN: CPT | Mod: S$PBB,,, | Performed by: INTERNAL MEDICINE

## 2023-10-24 PROCEDURE — 99215 OFFICE O/P EST HI 40 MIN: CPT | Mod: PBBFAC,PN | Performed by: INTERNAL MEDICINE

## 2023-10-24 PROCEDURE — 99999 PR PBB SHADOW E&M-EST. PATIENT-LVL V: CPT | Mod: PBBFAC,,, | Performed by: INTERNAL MEDICINE

## 2023-10-24 PROCEDURE — 99215 PR OFFICE/OUTPT VISIT, EST, LEVL V, 40-54 MIN: ICD-10-PCS | Mod: S$PBB,,, | Performed by: INTERNAL MEDICINE

## 2023-10-24 RX ORDER — TRAMADOL HYDROCHLORIDE 50 MG/1
50 TABLET ORAL EVERY 6 HOURS PRN
COMMUNITY
Start: 2023-10-23 | End: 2023-11-28 | Stop reason: ALTCHOICE

## 2023-10-24 RX ORDER — ACETAMINOPHEN 500 MG
1 TABLET ORAL DAILY
Qty: 1 EACH | Refills: 0 | Status: SHIPPED | OUTPATIENT
Start: 2023-10-24 | End: 2024-10-23

## 2023-10-24 RX ORDER — AZELASTINE 1 MG/ML
1 SPRAY, METERED NASAL 2 TIMES DAILY PRN
Qty: 30 ML | Refills: 5 | Status: SHIPPED | OUTPATIENT
Start: 2023-10-24 | End: 2024-01-19

## 2023-10-24 RX ORDER — TRAZODONE HYDROCHLORIDE 50 MG/1
25 TABLET ORAL NIGHTLY
Qty: 15 TABLET | Refills: 2 | Status: SHIPPED | OUTPATIENT
Start: 2023-10-24 | End: 2023-11-28 | Stop reason: ALTCHOICE

## 2023-10-24 NOTE — PATIENT INSTRUCTIONS
"If you are feeling unwell, we'd like to be the first ones to know here at [facility]! Please give us a call. Same day appointments are our top priority to keep you well and out of the emergency rooms and hospitals. Call [phone] for our direct line. After hours advice is always available. Please call [phone] after hours to speak to the on-call team.      For allergies see if can hold cetirizine - just take "as needed"   Ok to resume montelukast -  recommend intranasal steriod 2 spray each nares daily 2-4 weeks  Azelastine as needed for sinus irritation, runny nose 2 times daily  Consider also twice daily saline water sinus flush    Make sure you are hydrating well  Caution when moving from lying or sitting to stand   Try halving trazodone dose to see if helps     "

## 2023-10-24 NOTE — CONSULTS
[facility]  Response for E-Consult     Patient Name: Alison Lobo  MRN: [id number]  Primary Care Provider: Roselia Gillette MD   Requesting Provider: Roselia Gillette MD  E-Consult to Gastroenterology  Consult performed by: Rick Funk MD  Consult ordered by: Roselia Gillette MD  Assessment/Recommendations: Econsult cancelled as it should go to hepatology instead and a different econsult was placed by the PCP.          Unfortunately, this eConsult has been declined due to: Other    Other:  This eConsult submission cannot be completed at this time due to Dr. Sotelo asked the PCP to route this to Hepatology instead.  I am placing a decline on the eConsult to remove it from the pool list..      Thank you for this eConsult referral.     Rick Funk MD  [facility]

## 2023-10-24 NOTE — PROGRESS NOTES
Alison Lobo  10/24/2023  [id number]    Roselia Gillette MD  Patient Care Team:  Roselia Gillette MD as PCP - General (Internal Medicine)  Clarissa Stephenson NP as Nurse Practitioner (Family Medicine)  Ishan Whatley MD as Consulting Physician (Breast Surgery)    Visit Type: Follow-up    Chief Complaint:  Chief Complaint   Patient presents with    Follow-up     ER f/u     History of Present Illness: Ms. Alison Lobo is a 73 year old female here for ER f/u.      Medical issues include prediabetes, B Chun's neuroma/foot pain, JADA (CPAP), obesity, R breast cancer 2002, h/o L breast cancer 2018 (both small invasive ductal carcinoma), recurrent MDD, MEHNAZ, HLD, hypothyroid.    After appointment here yesterday went home felt nauseous, dizzy, fell backwards and hit back of head - no LOC - went to [facility] ED - 4 staples - CT head and C-Spine - IVF - labs - discharged home w instructions to have staples removed in 10 days (10/30/23).    Felt ok Sat and Sun except a little lightheaded moving from lying to stand. Feeling more woozy, queasy this morning.    Food not tasting right since about July  C/o increased sinus allergy symptoms past couple of weeks - has been taking cetirizine - ran out of motelukast - has intra-nasal steriod at home but hasn't been using recently as allergy had been better w oral meds    PHQ-4 Score: 0     From visit last Friday 10/20/23  Not exercising but has been doing yardwork  Was using pool at [facility] but it's been closed     Still w fairly sig anxiety, anhedonia  Saw Psychiatrist @ [facility] last week added Wellbutrin  Psychiatrist ordered sleep study which was completed and now using CPAP  Unsure who will be monitoring CPAP going forward (Psychiatrist? Sleep center?)     Report R ear fullness has resolved  Reports allergy symptoms less bothersome recently  Denies sig GI issues     Cont issues w Adiel's neuroma - moving from toe into forefoot  Has special shoe inserts and has seen Podiatry  Cliff a couple of times for this     PHQ-4 Score: 9     Hosp/ED/Urgent Care:  10/20/23 ED [facility] fall dehydration    Recent appointments:   10/20/23 O65+ Gillette  10/09/23 Breast Surg Phylicia   6/14/23 Rheum Hille OA  4/18/23 Ophthal Kp  4/06/23 O[facility]+ [facility]  4/05/23 Breast Surg Phylicia   3/31/23 O[facility]+ [facility]  3/27/23 O65+ Gladis LCSW  3/20/23 O65+ Gillette  3/08/23 O65+ Seema [facility]    Upcoming appointments:  Future Appointments       Date Provider Specialty Appt Notes    10/30/2023  Primary Care Suture/staple removal    10/30/2023 Madison Griffith LCSW Primary Care Initial LCSW Visit    12/8/2023 Osmani Reyes MD Cardiology 1 year f/u    1/16/2024  Lab Transaminitis [R74.01]    1/19/2024 Roselia Gillette MD Primary Care 3 month f/u    4/22/2024 Ishan Whatley MD Breast Surgery MGM, CXR           The following were reviewed: Active problem list, medication list, allergies, family history, social history, and Health Maintenance.     Medications have been reviewed and reconciled with patient at visit today.    Review of Systems   See HPI above    Exam: sat 97%  Vitals:    10/24/23 0856   BP: 136/66   Pulse: 96   Temp:      Weight: 86.6 kg (191 lb)   Body mass index is 33.83 kg/m².    BP Readings from Last 3 Encounters:   10/24/23 136/66   10/20/23 122/60   06/14/23 109/64      Wt Readings from Last 3 Encounters:   10/24/23 0819 86.6 kg (191 lb)   10/20/23 1306 86.8 kg (191 lb 4.8 oz)   06/14/23 1131 87.8 kg (193 lb 9 oz)      Orthostatics BP  Pulse  Lying  149/60   90  Stand 1 min 130/60  96  Stand 3 min 136/66  94  Feeling a little off balance w standing    Physical Exam  Vitals reviewed.   Constitutional:       General: She is not in acute distress.     Appearance: Normal appearance. She is obese. She is not ill-appearing.   HENT:      Head: Normocephalic and atraumatic.      Right Ear: Tympanic membrane, ear canal and external ear normal.      Left Ear: Tympanic membrane, ear  canal and external ear normal.      Nose: Nose normal.      Mouth/Throat:      Mouth: Mucous membranes are moist.      Pharynx: Oropharynx is clear.   Eyes:      Extraocular Movements: Extraocular movements intact.      Conjunctiva/sclera: Conjunctivae normal.   Neck:      Vascular: No carotid bruit.   Cardiovascular:      Rate and Rhythm: Normal rate and regular rhythm.   Pulmonary:      Effort: Pulmonary effort is normal. No respiratory distress.   Abdominal:      General: Bowel sounds are normal.      Palpations: Abdomen is soft.      Tenderness: There is no abdominal tenderness. There is no guarding.   Musculoskeletal:      Cervical back: No rigidity or tenderness.      Right lower leg: No edema.      Left lower leg: No edema.   Lymphadenopathy:      Cervical: No cervical adenopathy.   Skin:     General: Skin is warm and dry.      Findings: Bruising present.      Comments: Lac w staples in place R lower posterior scalp w scab over laceration, adjacent bruising - no sig swelling or pain on palpation  Bruise upper interior of R arm   Neurological:      General: No focal deficit present.      Mental Status: She is alert and oriented to person, place, and time. Mental status is at baseline.   Psychiatric:         Behavior: Behavior normal.         Thought Content: Thought content normal.     Laboratory Reviewed  Lab Results   Component Value Date    WBC 5.78 10/09/2023    HGB 13.6 10/09/2023    HCT 42.1 10/09/2023     10/09/2023    MCV 98 10/09/2023    CHOL 138 11/07/2022    TRIG 150 11/07/2022    HDL 41 11/07/2022    LDLCALC 67.0 11/07/2022    ALT 47 (H) 10/09/2023    AST 53 (H) 10/09/2023     10/09/2023    K 4.4 10/09/2023     10/09/2023    CREATININE 0.6 10/09/2023    BUN 19 10/09/2023    CO2 23 10/09/2023    MG 1.9 11/07/2022    TSH 1.849 11/17/2022    HGBA1C 5.9 (H) 03/08/2023    CRP 1.1 12/15/2022     Lab Results   Component Value Date    CALCIUM 9.8 10/09/2023    PHOS 3.3 11/07/2022    No  "results found for: "[id number]"No results found for: "FOLATE" No results found for: "UIBC", "IRON", "TRANS", "TRANSFERRIN", "TIBC", "LABIRON", "FESATURATED"   Lab Results   Component Value Date    EGFRNORACEVR >60.0 10/09/2023    ALBUMIN 3.8 10/09/2023     Lab Results   Component Value Date    [id number] 58 11/07/2022          Assessment:   73 y.o. female with multiple co-morbid illnesses here for continued follow up of medical problems.      The primary encounter diagnosis was Balance problem. Diagnoses of Fall (on) (from) unspecified stairs and steps, subsequent encounter, Fall against object, Seasonal allergic rhinitis, unspecified trigger, and Orthostasis were also pertinent to this visit.      Plan:   1. Balance problem  Assessment & Plan:  Previously evaluation and referral to PT but did not follow through - in agreement w reordering referral to PT given recent fall    Orders:  -     Ambulatory referral/consult to Physical/Occupational Therapy; Future; Expected date: 10/31/2023    2. Fall (on) (from) unspecified stairs and steps, subsequent encounter  -     Ambulatory referral/consult to Physical/Occupational Therapy; Future; Expected date: 10/31/2023    3. Fall against object  -     Ambulatory referral/consult to Physical/Occupational Therapy; Future; Expected date: 10/31/2023    4. Seasonal allergic rhinitis, unspecified trigger  Assessment & Plan:  Recommend cetirizine prn rather than nightly - ok to resume montelukast - recommend fluticasone intranasal 2 spray each nares daily 2-4 weeks - azelastine BID prn       5. Orthostasis  Assessment & Plan:  Hydrate - caution moving from lying or sitting to stand - ensure balance stable before walking - if feeling woozy, lightheaded advise check BP, pulse - recently prescribed new psychotropic medications - trazodone and bupropion - trazodone can cause/contribute to orthostasis - advised see if half trazodone effective and maybe less orthostatic?      Other " orders  -     azelastine (ASTELIN) 137 mcg (0.1 %) nasal spray; 1 spray (137 mcg total) by Nasal route 2 (two) times daily as needed for Rhinitis.  Dispense: 30 mL; Refill: 5  -     traZODone (DESYREL) 50 MG tablet; Take 0.5 tablets (25 mg total) by mouth every evening.  Dispense: 15 tablet; Refill: 2  -     blood pressure monitor (BLOOD PRESSURE KIT) Kit; 1 kit by Misc.(Non-Drug; Combo Route) route Daily.  Dispense: 1 each; Refill: 0         Health Maintenance         Date Due Completion Date    Hepatitis C Screening Never done ---    COVID-19 Vaccine (7 - 2023-24 season) 10/01/2023 8/6/2023    Hemoglobin A1c (Prediabetes) 03/08/2024 3/8/2023    Colorectal Cancer Screening 09/13/2025 9/13/2022    DEXA Scan 05/12/2027 5/12/2022    Lipid Panel 11/07/2027 11/7/2022    TETANUS VACCINE 05/09/2029 5/9/2019          -Patient's lab results were reviewed and discussed with patient  -Treatment options and alternatives were discussed with the patient. Patient expressed understanding. Patient was given the opportunity to ask questions and be an active participant in their medical care. Patient had no further questions or concerns at this time.   -Patient is an overall moderate to HIGH risk for health complications from their medical conditions.     Follow up: Follow up in about 6 days (around 10/30/2023) for for nurse visit - staple removal.      After visit summary printed and given to patient upon discharge.  Patient care plan included in After visit summary.    TOTAL TIME evaluating and managing this patient for this encounter was greater than 60 minutes. This time was spent personally by me on some of the following activities: review of patient's past medical history, assessing age-appropriate health maintenance needs, review of any interval history, review and interpretation of lab results, review and interpretation of imaging test results, review and interpretation of cardiology test results, reviewing consulting  specialist notes, obtaining history from the patient and family, examination of the patient, medication reconciliation, managing and/or ordering prescription medications, ordering imaging tests, ordering referral to subspecialty provider(s), educating patient and answering their questions about diagnosis, treatment plan, and goals of treatment, discussing planned follow-up and final documentation of the visit. This time was exclusive of any separately billable procedures for this patient and exclusive of time spent treating any other patients.

## 2023-10-25 ENCOUNTER — E-CONSULT (OUTPATIENT)
Dept: HEPATOLOGY | Facility: CLINIC | Age: 73
End: 2023-10-25
Payer: MEDICARE

## 2023-10-25 DIAGNOSIS — R74.01 TRANSAMINITIS: Primary | ICD-10-CM

## 2023-10-25 DIAGNOSIS — R74.8 ELEVATED LIVER ENZYMES: Primary | ICD-10-CM

## 2023-10-25 PROCEDURE — 99447 NTRPROF PH1/NTRNET/EHR 11-20: CPT | Mod: ,,, | Performed by: NURSE PRACTITIONER

## 2023-10-25 PROCEDURE — 99447 PR INTERPROF, PHONE/INTERNET/EHR, CONSULT, 11-20 MINS: ICD-10-PCS | Mod: ,,, | Performed by: NURSE PRACTITIONER

## 2023-10-25 NOTE — ASSESSMENT & PLAN NOTE
Recommend cetirizine prn rather than nightly - ok to resume montelukast - recommend fluticasone intranasal 2 spray each nares daily 2-4 weeks - azelastine BID prn

## 2023-10-25 NOTE — ASSESSMENT & PLAN NOTE
Previously evaluation and referral to PT but did not follow through - in agreement w reordering referral to PT given recent fall

## 2023-10-25 NOTE — CONSULTS
[facility] Hepatology  Response for E-Consult     Patient Name: Alison Lobo  MRN: [id number]  Primary Care Provider: Roselia Gillette MD   Requesting Provider: Roselia Gillette MD  Consults    Recommendation: Chart review shows chronically elevated AST and ALT.  Also noted is a positive ADELE.  Would recommend checking ASMA and AMA to R/O autoimmune liver disease  Would also recommend an abdominal ultrasound and Fibroscan for staging of fibrosis     Contingency:     Total time of Consultation: 20 minute    I did not speak to the requesting provider verbally about this.     *This eConsult is based on the clinical data available to me and is furnished without benefit of a physical examination. The eConsult will need to be interpreted in light of any clinical issues or changes in patient status not available to me at the time of filing this eConsults. Significant changes in patient condition or level of acuity should result in immediate formal consultation and reevaluation. Please alert me if you have further questions.    Thank you for this eConsult referral.     NEEMA Real  [facility] Hepatology

## 2023-10-25 NOTE — ASSESSMENT & PLAN NOTE
Hydrate - caution moving from lying or sitting to stand - ensure balance stable before walking - if feeling woozy, lightheaded advise check BP, pulse - recently prescribed new psychotropic medications - trazodone and bupropion - trazodone can cause/contribute to orthostasis - advised see if half trazodone effective and maybe less orthostatic?

## 2023-10-26 ENCOUNTER — TELEPHONE (OUTPATIENT)
Dept: PRIMARY CARE CLINIC | Facility: CLINIC | Age: 73
End: 2023-10-26
Payer: MEDICARE

## 2023-10-30 ENCOUNTER — CLINICAL SUPPORT (OUTPATIENT)
Dept: PRIMARY CARE CLINIC | Facility: CLINIC | Age: 73
End: 2023-10-30
Payer: MEDICARE

## 2023-10-30 DIAGNOSIS — Z48.02 REMOVAL OF STAPLE: Primary | ICD-10-CM

## 2023-10-30 DIAGNOSIS — F41.1 GAD (GENERALIZED ANXIETY DISORDER): Chronic | ICD-10-CM

## 2023-10-30 DIAGNOSIS — R45.84 ANHEDONIA: ICD-10-CM

## 2023-10-30 PROCEDURE — 99499 UNLISTED E&M SERVICE: CPT | Mod: S$PBB,,,

## 2023-10-30 PROCEDURE — 99499 NO LOS: ICD-10-PCS | Mod: S$PBB,,,

## 2023-10-30 PROCEDURE — 99999 PR PBB SHADOW E&M-EST. PATIENT-LVL I: ICD-10-PCS | Mod: PBBFAC,,,

## 2023-10-30 PROCEDURE — 99211 OFF/OP EST MAY X REQ PHY/QHP: CPT | Mod: PBBFAC,PN

## 2023-10-30 PROCEDURE — 99999 PR PBB SHADOW E&M-EST. PATIENT-LVL I: CPT | Mod: PBBFAC,,,

## 2023-10-30 NOTE — PROGRESS NOTES
Pt here for staple removal. 4 staples removed from posterior right head. Wound healing well, no signs of infection. Pt tolerated well.

## 2023-11-01 ENCOUNTER — PROCEDURE VISIT (OUTPATIENT)
Dept: HEPATOLOGY | Facility: CLINIC | Age: 73
End: 2023-11-01
Payer: MEDICARE

## 2023-11-01 ENCOUNTER — LAB VISIT (OUTPATIENT)
Dept: LAB | Facility: HOSPITAL | Age: 73
End: 2023-11-01
Payer: MEDICARE

## 2023-11-01 VITALS — WEIGHT: 191.81 LBS | BODY MASS INDEX: 33.98 KG/M2 | HEIGHT: 63 IN

## 2023-11-01 DIAGNOSIS — R74.01 TRANSAMINITIS: ICD-10-CM

## 2023-11-01 DIAGNOSIS — Z11.59 NEED FOR HEPATITIS C SCREENING TEST: ICD-10-CM

## 2023-11-01 LAB
HCV AB SERPL QL IA: NORMAL
HCV AB SERPL QL IA: NORMAL

## 2023-11-01 PROCEDURE — 76981 USE PARENCHYMA: CPT | Mod: 59,PBBFAC | Performed by: NURSE PRACTITIONER

## 2023-11-01 PROCEDURE — 86381 MITOCHONDRIAL ANTIBODY EACH: CPT | Performed by: INTERNAL MEDICINE

## 2023-11-01 PROCEDURE — 91200 FIBROSCAN (VIBRATION CONTROLLED TRANSIENT ELASTOGRAPHY): ICD-10-PCS | Mod: 26,S$PBB,, | Performed by: INTERNAL MEDICINE

## 2023-11-01 PROCEDURE — 91200 LIVER ELASTOGRAPHY: CPT | Mod: PBBFAC

## 2023-11-01 PROCEDURE — 91200 LIVER ELASTOGRAPHY: CPT | Mod: 26,S$PBB,, | Performed by: INTERNAL MEDICINE

## 2023-11-01 PROCEDURE — 76981 USE PARENCHYMA: CPT | Mod: 26,S$PBB,, | Performed by: NURSE PRACTITIONER

## 2023-11-01 PROCEDURE — 36415 COLL VENOUS BLD VENIPUNCTURE: CPT | Performed by: INTERNAL MEDICINE

## 2023-11-01 PROCEDURE — 86803 HEPATITIS C AB TEST: CPT | Performed by: INTERNAL MEDICINE

## 2023-11-01 PROCEDURE — 76981 PR US, ELASTOGRAPHY, PARENCHYMA: ICD-10-PCS | Mod: 26,S$PBB,, | Performed by: NURSE PRACTITIONER

## 2023-11-01 PROCEDURE — 86015 ACTIN ANTIBODY EACH: CPT | Performed by: INTERNAL MEDICINE

## 2023-11-01 NOTE — PROCEDURES
FibroScan (Vibration Controlled Transient Elastography)    Date/Time: 11/1/2023 1:00 PM    Performed by: Sarahy Neri RN  Authorized by: Roselia Gillette MD    Probe:     Universal Protocol: Patient's identity, procedure and site were verified, confirmatory pause was performed.  Discussed procedure including risks and potential complications.  Questions answered.  Patient verbalizes understanding and wishes to proceed with VCTE.     Procedure: After providing explanations of the procedure, patient was placed in the supine position with right arm in maximum abduction to allow optimal exposure of right lateral abdomen.  Patient was briefly assessed, Testing was performed in the mid-axillary location, 50Hz Shear Wave pulses were applied and the resulting Shear Wave and Propagation Speed detected with a 3.5 MHz ultrasonic signal, using the FibroScan probe, Skin to liver capsule distance and liver parenchyma were accessed during the entire examination with the FibroScan probe, Patient was instructed to breathe normally and to abstain from sudden movements during the procedure, allowing for random measurements of liver stiffness. At least 10 Shear Waves were produced, Individual measurements of each Shear Wave were calculated.  Patient tolerated the procedure well with no complications.  Meets discharge criteria as was dismissed.  Rates pain 0 out of 10.  Patient will follow up with ordering provider to review results.

## 2023-11-02 ENCOUNTER — PATIENT MESSAGE (OUTPATIENT)
Dept: PRIMARY CARE CLINIC | Facility: CLINIC | Age: 73
End: 2023-11-02
Payer: MEDICARE

## 2023-11-02 DIAGNOSIS — K76.0 HEPATIC FIBROSIS DUE TO NONALCOHOLIC FATTY LIVER DISEASE: ICD-10-CM

## 2023-11-02 DIAGNOSIS — K74.00 HEPATIC FIBROSIS DUE TO NONALCOHOLIC FATTY LIVER DISEASE: ICD-10-CM

## 2023-11-02 DIAGNOSIS — K76.0 NAFLD (NONALCOHOLIC FATTY LIVER DISEASE): Primary | ICD-10-CM

## 2023-11-02 NOTE — PROCEDURES
Fibroscan Procedure     Name: Alison Lobo  Date of Procedure : 2023   :: NEEMA Real  Diagnosis: other    Probe: M    Fibroscan reading: 10.0 KPa    Fibrosis:F3     CAP readin dB/m    Steatosis: :S3       Interpretation:   Severe steatosis with advanced fibrosis

## 2023-11-03 ENCOUNTER — TELEPHONE (OUTPATIENT)
Dept: PRIMARY CARE CLINIC | Facility: CLINIC | Age: 73
End: 2023-11-03
Payer: MEDICARE

## 2023-11-03 LAB — MITOCHONDRIA AB TITR SER IF: NORMAL {TITER}

## 2023-11-03 NOTE — TELEPHONE ENCOUNTER
Spoke w Ms. Lobo regarding findings of steatosis and fibrosis on recent Fibroscan w recommendation for f/u w Hepatology.  Information was provided at last visit on NAFLD and reviewed again some of these recommendations.

## 2023-11-04 NOTE — PROGRESS NOTES
Pt has [facility] - if message below not viewed please call w information below:   Please f/u w hepatology as scheduled.    Please message or call with any questions or concerns!  Thank you!  Roselia Gillette MD, MPH  [facility]

## 2023-11-06 NOTE — PROGRESS NOTES
[facility] BEHAVIORAL HEALTH INTAKE    DATE:  10/30/2023  REFERRAL SOURCE:  Roselia Gillette MD  TYPE OF VISIT:  In person  LENGTH OF SESSION: 60  .  HISTORY OF PRESENTING ILLNESS:  Alison Lobo, a 73 y.o. female with history of Anxiety disorders; generalized anxiety disorder [F41.1] and Major Depressive Disorder, Recurrent, Moderate (F33.1).    CHIEF COMPLAINT/REASON FOR ENCOUNTER: Pt presented for initial assessment. Pt's chief complaint includes the following: depression and anxiety.    PSYCHIATRIC HISTORY:  Patient does currently have a psychiatrist. Saw Psych at [facility]. Prescribed Wellbutrin 10/2023.    Patient does not currently have a therapist.  Did see Dianna Mcnulty LCSW a few times in 2022/2023.   Pt is taking bupropion SR (Wellbutrin) 150mg once daily and venlafaxine XR (Effexor ER) 150mg once daily for Depression. They are interested in medication changes. Patient is unsure if she finds the Wellbutrin helpful. She was on Prozac for 13 years until it suddenly stopped working for her in her mid-40's.   Previous Psychiatric Hospitalizations:  No  History of Trauma:  No  History of Violence:  No  Access to a gun/weapon:  Yes  Previous Suicide Attempts:  No    Risk assessment:  Patient reports no suicidal ideation  Patient reports no homicidal ideation  Patient reports no self-injurious behavior  Patient reports no violent behavior    PSYCHIATRIC FAMILY HISTORY: none    SUBSTANCE ABUSE HISTORY:  Tobacco:  No   Alcohol: none  Illicit Substances: No  Misuse of Prescription Medications:  No    MEDICAL HISTORY:  Past Medical History:   Diagnosis Date    Cancer     Hyperlipidemia     Malignant neoplasm of upper-outer quadrant of right breast in female, estrogen receptor positive 9/25/2023         SOCIAL HISTORY (MARRIAGE, EMPLOYMENT, etc.):  Living Situation: Alone, with catDontae.   Relationship status Mx1; Dx1. Female love.  Currently single. Not dating.  Children/Family: Cousin, Beatriz Cespedes and her  "spouse.  Dtr, Rachel and spouse, Asif - estranged. 2 grandkids, ages 13 and 16 -some contact.   Supports:Cousin Beatriz Cespedes. Friends.  Education/Vocation: Phd. [employer]  Amish/Spirituality: None. Raised Jewish.   Hobbies and Interests: Art, Gardening.  of [employer].     Current social stressors:   Estrangement from ritarRachel.  Lack of motivation and activities post-nursing home.    Current symptoms:  Depression: anhedonia, worthlessness/guilt, and hopelessness.  Anxiety: excessive worrying and restlessness.  Insomnia:  denies .  Heidi:  denies.  Psychosis: denies .    MENTAL HEALTH STATUS EXAM  General Appearance:  unremarkable, age appropriate   Speech: normal tone, normal rate, normal pitch, normal volume      Level of Cooperation: cooperative      Thought Processes: normal and logical   Mood: dysthymic      Thought Content: normal, no suicidality, no homicidality, delusions, or paranoia   Affect: congruent and appropriate   Orientation: Oriented x3   Memory: recent >  intact, remote >  intact   Attention Span & Concentration: intact   Fund of General Knowledge: intact and appropriate to age and level of education   Judgment & Insight: good   Language  intact     Session Content/Presenting Problem Hx:    Patient grew up in [place] in a two parent family. She is an only child. Says she always felt like an outsider within her family. Describes family as a strong Martiniquais family dominated by her Martiniquais grandmother. She describes her mother as depressed. Says mom had a lot of anger and frustration, but ultimately withdrew. She describes her mother as "an enigma." Dad is described as a simple man, a very nice man. She moved to [place] where she lived for many decades and had a thriving career.  She was  for a few years to [place]. Says they were both "nerds"; [place] had issues with drugs and bad temper. They had one daughter, Rachel. Patient says Rachel was hard to get along with " "from a young age. Says divorce was amicable. Rachel was reliant on her for support for a long time.  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 There were some serious issues which were uncovered, forcing the  to resign. Because she had been very aligned with the  she also felt the need to resign.  She then moved to [place] which has always been very special for her. She had a relationship with a woman she describes as "the love of my life." When diagnosed with breast cancer the first time, her cousin Beatriz Cespedes came out to help her. When cancer reappeared a few years later, patient decided to move here to be close to her cousin.  It has been a difficult adjustment. In [place] patient had a good group of friends; she gardened a lot and also began drawing and painting. A few years ago, she and Rachel had a blow up and Rachel said she would never talk to her again. Patient is able to maintain contact with her two grandkids and speaks to Rachel's , Asif. She is very hurt by this estrangement but does not believe Rachel will ever change. 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 Beatriz is an artist. Patient likes to feel challenged in her activities but she also likes to feel she is succeeding.  She has a cat, Dontae. She reads a lot. She is comfortable being alone.  She does not enjoy exercise and feels she must make herself do it.  She does enjoy water aerobics. She will have a P.T. eval soon.         STRENGTHS AND LIABILITIES: Strength: Patient is expressive/articulate., Strength: Patient is intelligent., Strength: Patient has reasonable judgment., Strength: Patient is stable.    IMPRESSION:   My diagnostic impression is Anxiety disorders; generalized anxiety disorder [F41.1] and Major Depressive Disorder, Recurrent, Moderate (F33.1), as " evidenced by patient report of worrying, feeling down and hopeless, difficulty sleeping, poor appetite, irritability..     TREATMENT GOALS: Anxiety: reducing negative automatic thoughts and reducing time spent worrying (<30 minutes/day)  Depression: increasing energy, increasing interest in usual activities, increasing motivation, reducing excessive guilt, and reducing negative automatic thoughts    PLAN: In this session a psychosocial evaluation was conducted to get history and determine a treatment plan. CBT will be utilized in future individual  therapy sessions to increase interaction, insight, and support.     NEXT APPOINTMENT: November 7, 2023

## 2023-11-07 ENCOUNTER — CLINICAL SUPPORT (OUTPATIENT)
Dept: PRIMARY CARE CLINIC | Facility: CLINIC | Age: 73
End: 2023-11-07
Payer: MEDICARE

## 2023-11-07 DIAGNOSIS — R45.84 ANHEDONIA: ICD-10-CM

## 2023-11-07 DIAGNOSIS — F41.9 ANXIETY: Primary | ICD-10-CM

## 2023-11-07 LAB — SMOOTH MUSCLE AB TITR SER IF: NORMAL {TITER}

## 2023-11-07 PROCEDURE — 99499 UNLISTED E&M SERVICE: CPT | Mod: S$PBB,,,

## 2023-11-07 PROCEDURE — 99499 NO LOS: ICD-10-PCS | Mod: S$PBB,,,

## 2023-11-07 NOTE — PROGRESS NOTES
"Individual Psychotherapy ([facility])  Alison Lobo,  2023  DATE:  2023  TYPE OF VISIT:  In person  LENGTH OF SESSION: 45    Therapeutic Intervention: Met with patient for individual psychotherapy.    Chief complaint/reason for encounter: depression, anxiety, sleep, and appetite       Session Content/Presenting Problem:    Patient asks for assistance with specific issue today. Her cousin, Beatriz Cespedes has a sister, Judy, that wants Agata as a friend.  Agata does not want to be involved with Judy due to Judy's long history of dysfunction.  She is currently "ghosting" her but does not feel good about it. She would like to resolve this somehow.  Afraid Judy will "glom" on to her.  Asks herself "why don't I want to be a better person?"  Patient acknowledges that she has tried to help Judy in the past with unsatisfactory results. Patient is not able to see any benefit to herself to allow Judy in. She is open to writing a letter explaining her reasons for ignoring Judy. She hopes to keep things cordial but wants to protect herself as well.  She says she feels similarly about her situation with Rachel. She says she believes Rachel truly won't speak to her again. However, Rachel has allowed her to call the kids; Rachel has also said things in background during [employer]' phone calls. She has not been overtly hostile towards Alison.  Alison discusses her long time difficulty in understanding Rachel as a person. Rachel has always dressed "differently", she is very into Halloween, she is a . Rachel never cared about school.  Had questionable friends. However, Rachel has not had any serious substance abuse issues, she is  and appears to have good relationships with her kids.  Discussed the possibility that Rachel has left some room for them to possibly reconnect in the future.  Patient is skeptical but would like to believe that she will reconnect with Rachel at some point. Encouraged " "patient to continue to contact the grandkids and to be there for them as much as possible.   Patient will go walking in an effort to increase her exercise. She will try to write a letter to cousin, Judy. She will call her [employer] this weekend.       Current symptoms:  Depression: anhedonia, worthlessness/guilt, hopelessness, and decreased appetite.  Anxiety: excessive worrying and restlessness.  Insomnia: non-restful sleep.  Heidi:  denies.  Psychosis: denies .      Risk parameters:  Patient reports no suicidal ideation  Patient reports no homicidal ideation  Patient reports no self-injurious behavior  Patient reports no violent behavior    MENTAL HEALTH STATUS EXAM  General Appearance:  unremarkable, age appropriate   Speech: normal tone, normal rate, normal pitch, normal volume      Level of Cooperation: cooperative      Thought Processes: normal and logical   Mood: steady      Thought Content: normal, no suicidality, no homicidality, delusions, or paranoia   Affect: congruent and appropriate   Orientation: Oriented x3   Attention Span & Concentration: intact   Fund of General Knowledge: intact and appropriate to age and level of education   Judgment & Insight: good     Language  intact       STRENGTHS AND LIABILITIES: Strength: Patient accepts guidance/feedback, Strength: Patient is expressive/articulate., Strength: Patient has reasonable judgment., Strength: Patient is stable.    IMPRESSION:   My diagnostic impression is Anxiety disorders; generalized anxiety disorder [F41.1] and Major Depressive Disorder, Recurrent, Mild (F33.0), as evidenced by feeling down, depressed, feeling irritable, difficulty sleeping, poor appetite, worrying too much.     TREATMENT GOALS (per patient):    "To feel more relevant. To experience calmness and peacefulness."  Exercise, sleep better, increase enjoyable activities.     Treatment plan:  Target symptoms: recurrent depression, anxiety , adjustment  Why chosen therapy is " appropriate versus another modality: relevant to diagnosis, patient responds to this modality, evidence based practice  Outcome monitoring methods: self-report, observation, checklist/rating scale  Therapeutic intervention type: insight oriented psychotherapy    Patient's response to intervention:  The patient's response to intervention is guarded.    Progress toward goals and other mental status changes:  The patient's progress toward goals is good.    Plan: Pt plans to continue individual psychotherapy CBT will be utilized in future individual therapy sessions to increase interaction, insight, and support.     Return to clinic: 3 weeksNovember 21st

## 2023-11-08 NOTE — PROGRESS NOTES
Pt has [facility] - if message below not viewed please call w information below:   Last in process lab resulted and is normal     Please message or call with any questions or concerns!  Thank you!  Roselia Gillette MD, MPH  [facility]

## 2023-11-10 ENCOUNTER — HOSPITAL ENCOUNTER (OUTPATIENT)
Dept: RADIOLOGY | Facility: HOSPITAL | Age: 73
Discharge: HOME | End: 2023-11-10
Attending: INTERNAL MEDICINE
Payer: MEDICARE

## 2023-11-10 DIAGNOSIS — R74.01 TRANSAMINITIS: ICD-10-CM

## 2023-11-10 PROCEDURE — 76700 US EXAM ABDOM COMPLETE: CPT | Mod: 26,,, | Performed by: STUDENT IN AN ORGANIZED HEALTH CARE EDUCATION/TRAINING PROGRAM

## 2023-11-10 PROCEDURE — 76700 US EXAM ABDOM COMPLETE: CPT | Mod: TC

## 2023-11-10 PROCEDURE — 76700 US ABDOMEN COMPLETE: ICD-10-PCS | Mod: 26,,, | Performed by: STUDENT IN AN ORGANIZED HEALTH CARE EDUCATION/TRAINING PROGRAM

## 2023-11-11 NOTE — PROGRESS NOTES
Pt has [facility] - if message below not viewed please call w information below:     Abdominal ultrasound shows likely fatty liver, possibly cirrhosis. No other significant concerning findings.     Please message or call with any questions or concerns!  Thank you!  Roselia Gillette MD, MPH  [facility]

## 2023-11-21 ENCOUNTER — CLINICAL SUPPORT (OUTPATIENT)
Dept: PRIMARY CARE CLINIC | Facility: CLINIC | Age: 73
End: 2023-11-21
Payer: MEDICARE

## 2023-11-21 DIAGNOSIS — F41.9 ANXIETY: Primary | ICD-10-CM

## 2023-11-21 PROCEDURE — 99499 NO LOS: ICD-10-PCS | Mod: S$PBB,,,

## 2023-11-21 PROCEDURE — 99499 UNLISTED E&M SERVICE: CPT | Mod: S$PBB,,,

## 2023-11-21 NOTE — PROGRESS NOTES
"Individual Psychotherapy ([facility])  Alison Lobo,  11/21/2023  DATE:  11/21/2023  TYPE OF VISIT:  In person  LENGTH OF SESSION: 45    Therapeutic Intervention: Met with patient for individual psychotherapy.    Chief complaint/reason for encounter: depression, anxiety, sleep, and appetite       Session Content/Presenting Problem:     Patient discusses her goal of wanting to feel peace and relevance in her life. She says she has anhedonia. She says "I have been a human doing, not a human being." She wants to work on her relationships with others. Discussed her estrangement from her daughter, Rachel. Patient was told once that they were not a good parent/child fit.  She sees that she has had a hard time understanding her daughter. Rachel's life goals have always been different from hers. Rachel is very social and did not worry about grades. She was able to talk her way into an AP class. Agata was never very social, felt like the "outsider", and was very focused on grades and achievement.  Agata also views Rachel as feeling "entitled" - she asked to have her inheritance now, rather than waiting for Agata to die.  Patient is beginning to consider accepting Rachel as a person who is very different from herself. Discussed working on accepting Rachel as she is, rather than who she wanted her to be. Patient admits that when Rachel sent her a harsh letter, she did not read it and has never really read it since then. It was a shock to see and felt painful so she never has looked at it again.  Patient will consider looking at the letter again in an attempt to better understand her daughter's thoughts and feelings.     Prior Session:  Patient asks for assistance with specific issue today. Her cousin, Beatriz Cespedes has a sister, Judy, that wants Agata as a friend.  Agata does not want to be involved with Judy due to Judy's long history of dysfunction.  She is currently "ghosting" her but does not feel good about it. She would like " "to resolve this somehow.  Afraid Judy will "glom" on to her.  Asks herself "why don't I want to be a better person?"  Patient acknowledges that she has tried to help Judy in the past with unsatisfactory results. Patient is not able to see any benefit to herself to allow Judy in. She is open to writing a letter explaining her reasons for ignoring Judy. She hopes to keep things cordial but wants to protect herself as well.  She says she feels similarly about her situation with Rachel. She says she believes Rachel truly won't speak to her again. However, Rachel has allowed her to call the kids; Rachel has also said things in background during [employer] phone calls. She has not been overtly hostile towards Alison.  Alison discusses her long time difficulty in understanding Rachel as a person. Rachel has always dressed "differently", she is very into Halloween, she is a . Rachel never cared about school.  Had questionable friends. However, Rachel has not had any serious substance abuse issues, she is  and appears to have good relationships with her kids.  Discussed the possibility that Rachel has left some room for them to possibly reconnect in the future.  Patient is skeptical but would like to believe that she will reconnect with Rachel at some point. Encouraged patient to continue to contact the [employer] and to be there for them as much as possible.   Patient will go walking in an effort to increase her exercise. She will try to write a letter to cousin, Judy. She will call her [employer] this weekend.       Current symptoms:  Depression: anhedonia, worthlessness/guilt, hopelessness, and decreased appetite.  Anxiety: excessive worrying and restlessness.  Insomnia: non-restful sleep.  Heidi:  denies.  Psychosis: denies .      Risk parameters:  Patient reports no suicidal ideation  Patient reports no homicidal ideation  Patient reports no self-injurious behavior  Patient reports no " "violent behavior    MENTAL HEALTH STATUS EXAM  General Appearance:  unremarkable, age appropriate   Speech: normal tone, normal rate, normal pitch, normal volume      Level of Cooperation: cooperative      Thought Processes: normal and logical   Mood: steady      Thought Content: normal, no suicidality, no homicidality, delusions, or paranoia   Affect: congruent and appropriate   Orientation: Oriented x3   Attention Span & Concentration: intact   Fund of General Knowledge: intact and appropriate to age and level of education   Judgment & Insight: good     Language  intact       STRENGTHS AND LIABILITIES: Strength: Patient accepts guidance/feedback, Strength: Patient is expressive/articulate., Strength: Patient has reasonable judgment., Strength: Patient is stable.    IMPRESSION:   My diagnostic impression is Anxiety disorders; generalized anxiety disorder [F41.1] and Major Depressive Disorder, Recurrent, Mild (F33.0), as evidenced by feeling down, depressed, feeling irritable, difficulty sleeping, poor appetite, worrying too much.     TREATMENT GOALS (per patient):    "To feel more relevant. To experience calmness and peacefulness."  Exercise, sleep better, increase enjoyable activities.     Treatment plan:  Target symptoms: recurrent depression, anxiety , adjustment  Why chosen therapy is appropriate versus another modality: relevant to diagnosis, patient responds to this modality, evidence based practice  Outcome monitoring methods: self-report, observation, checklist/rating scale  Therapeutic intervention type: insight oriented psychotherapy    Patient's response to intervention:  The patient's response to intervention is guarded.    Progress toward goals and other mental status changes:  The patient's progress toward goals is good.    Plan: Pt plans to continue individual psychotherapy CBT will be utilized in future individual therapy sessions to increase interaction, insight, and support.     Return to clinic: " 2 weeks December 4th

## 2023-11-22 ENCOUNTER — CLINICAL SUPPORT (OUTPATIENT)
Dept: REHABILITATION | Facility: HOSPITAL | Age: 73
End: 2023-11-22
Payer: MEDICARE

## 2023-11-22 DIAGNOSIS — R68.89 DECREASED STRENGTH, ENDURANCE, AND MOBILITY: Primary | ICD-10-CM
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
## 2023-11-22 DIAGNOSIS — R26.89 BALANCE PROBLEM: ICD-10-CM

## 2023-11-22 DIAGNOSIS — R53.1 DECREASED STRENGTH, ENDURANCE, AND MOBILITY: Primary | ICD-10-CM
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
## 2023-11-22 DIAGNOSIS — Z74.09 DECREASED STRENGTH, ENDURANCE, AND MOBILITY: Primary | ICD-10-CM

## 2023-11-22 PROCEDURE — 97162 PT EVAL MOD COMPLEX 30 MIN: CPT

## 2023-11-22 NOTE — PLAN OF CARE
[facility]   Physical Therapy Initial Evaluation     Date: 11/22/2023   Name: Alison Lobo  Clinic Number: [id number]    Therapy Diagnosis:   Encounter Diagnoses   Name Primary?    Balance problem     Fall (on) (from) unspecified stairs and steps, subsequent encounter     Fall against object      Physician: Roselia Gillette MD    Physician Orders: PT Eval and Treat   Medical Diagnosis from Referral:  Balance problem   Fall (on) (from) unspecified stairs and steps, subsequent encounter   Fall against object   Evaluation Date: 11/22/2023  Authorization Period Expiration: 10/23/2024  Plan of Care Expiration: 2/22/2024  Progress Note Due: 12/22/2023  Visit # / Visits authorized: 1/ 1   FOTO: 1/ 3     Precautions: Standard and Fall    Time In: 1045  Time Out: 1145  Total Appointment Time (timed & untimed codes): 60 minutes    SUBJECTIVE   Date of onset: 2023    History of current condition - Alison reports: that she feels unsteady on uneven ground.  Did have a fall a few months ago related to medication changes.  That didn't make balance worse.  She reports that balance has been ongoing for a few years.  She relates balance issues to Chun's neuroma issues over the last few years.  Would like to continue to work at the gym to develop greater strength.  Recently moved into [place] from [place] to be closer to family members.  However, she reports of limited motivation since she has moved back to [place].  The patient would like to find a consistent place to exercise.  However, she has not currently found that place in [place].    Falls: a few near falls but patient compensates by avoiding uneven surfaces    Imaging, X-ray of knees with nothing significant observed    Prior Therapy: Has received assessment at [facility] last year but did not follow up  Social History: Independent  Occupation: Retired  Prior Level of Function: Independent  Current Level of Function: Still  independent but avoids certain activities with fears of loss of balance    Pain:  No significant complaints of pain today    Patients goals: Feel more confident in balance and participate in a consistent exercise program     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 in female, estrogen receptor positive 2023       Surgical History:   Alison Lobo  has a past surgical history that includes  section; Hysterectomy; and Breast surgery.    Medications:   Alison has a current medication list which includes the following prescription(s): atorvastatin, azelastine, blood pressure monitor, bupropion, levothyroxine, meloxicam, metformin, metoprolol succinate, montelukast, tamoxifen, tramadol, trazodone, venlafaxine, and vitamin d.    Allergies:   Review of patient's allergies indicates:  No Known Allergies       OBJECTIVE     CMS Impairment/Limitation/Restriction for FOTO ABC - Balance Survey    Therapist reviewed FOTO scores for Alison Lobo on 2023.   FOTO documents entered into EPIC - see Media section.              Posture/Structure: Pt with kyphosis of the lumbar spine, and mild forward bent trunk posture    Gait Analysis: Decreased walking velocity, wide base of support    Sensation: Intact to light touch bilateral LE's    Reflexes: NT    Tandem Stance: Right anterior = 7s, L anterior 9s    Balance: SL R=2 s, L=4 s      AROM:    Thoracolumbar  (single inclinometer at L4/5) AROM  (degrees) Pain/Dysfunction with Movement   Flexion 60    Extension 8    Right side bending 15    Left side bending 10    Right rotation 100%    Left rotation 100%         Functional Tests  Outcome Norms   Timed Up and Go 8.3 <13.5 sec   30 second Sit to Stand 12 Age Male Female   60-64 <14 <12   65-69 <12 <11   70-74 <12 <10   75-79 <11 <10   80-84 <10 <9   85-89 <8 <8   90-93 <7 <4      Five Time Sit to Stand  12.7 60s: <11.4 sec  70s: <12.6 sec  80s: 14.8 sec   6 minutes walk NT 60s: >538f, 572m  70s: >471f, 527m  80s: >417f, 392m      TINETTI BALANCE ASSESSMENT TOOL    Prasanna ME, Zeus TF, Ramya R, Fall Risk Index for elderly patients based on number of chronic disabilities.Am J Med [id number]      BALANCE SECTION  Patient is seated in hard, armless chair;    1.Sitting Balance:   Steady; safe = 1  2.Rises from chair :  Able, without using arms = 2  3.Attempts to arise :  Able to arise, 1 attempt = 2  4.Immediate standing Balance (first 5 seconds) : Steady without walker or other support = 2  5.Standing balance: Narrow stance without support = 2  6.Nudged : Steady = 2  7.Eyes closed: Steady = 1  8.Turning 360 degrees:  Continuous = 1 and Steady = 1  9.Sitting Down : Safe, smooth motion = 2    Balance Score:  16/16    GAIT SECTION  Patient stands with therapist, walks across room (+/- aids), first at usual pace, then at rapid pace.    10.Initiation of Gait (Immediately after told to go.): No hesitancy = 1  11.Step length and height :  Step through R=1 and Step through L=1  12.Foot Clearance :  R foot clears floor=1   13.Step symmetry: Right & Left step length appear equal = 1  14.Step continuity : Steps appear continuous = 1  15.Path: Straight without walking aid = 2  16.Trunk : No sway, but flexion of knees or back or spreads arm out while walking = 1  17.Walking Time: Heels apart = 0    Gait Score: 10/12  Balance score:16/16  Total Score=Balance + Gait Score: 26/28     Risk Indicators:    Tinetti Tool Score   Risk of Falls   ?18    High   19-23   Moderate   ?24   Low     AROM:  Top Tier   Multi-Segmental Flexion Dysfunctional - Nonpainful   Multi-Segmental Extension Dysfunctional - Nonpainful   Multi-Segmental Rotation Dysfunctional - Nonpainful   Single Leg Stance Dysfunctional - Nonpainful   Arms Down Deep Squat Dysfunctional - Nonpainful          Strength:         L/E MMT Right Left Pain/Dysfunction  with Movement   Hip Flexion 4+/5 4+/5    Hip Extension 4-/5 3+/5    Hip Abduction 4-/5 3+/5    Hip Adduction 4-/5 4+/5    Hip IR 3+/5 3+/5    Hip ER 4-/5 4-/5    Knee Flexion 5/5 5/5    Knee Extension 5/5 5/5    Ankle DF 5/5 5/5    Ankle PF 4+/5 4-/5    Big Toe Extension NT NT    ASLR 4/5 4-/5            TREATMENT     Total Treatment time (time-based codes) separate from Evaluation: 5   minutes      Alison received the treatments listed below:      THERAPEUTIC EXERCISES to develop strength, endurance, ROM, flexibility, posture, and core stabilization for (5) minutes including:    Intervention Performed Today    Treadmill walking versus bike (might even perform 5 minute walk test on next visit)     Standing strengthening exercises with theraband  Hip Flex  Hip Extn  Hip Abd  Hip Add  Single leg heel raises   Standing hand touches - at parallel bars  Tandem - 2x15  Single leg stance  - 2x15  Tandem stance with vertical eye movements  Tandem stance with horizontal head movements   Agility/balance  Sidesteps  Carioca  Large forward steps  Backward walking   Sit to stand from lower surfaces  2x10                    Plan for Next Visit:         PATIENT EDUCATION AND HOME EXERCISES     Education provided:   - Importance of finding a gym to continue working on strengthening and balance activities    Written Home Exercises Provided: yes. Exercises were reviewed and Alison was able to demonstrate them prior to the end of the session.  Alison demonstrated good  understanding of the education provided. See EMR under Patient Instructions for exercises provided during therapy sessions.    ASSESSMENT     Alison is a 73 y.o. female referred to outpatient Physical Therapy with a medical diagnosis of    Balance problem    Fall (on) (from) unspecified stairs and steps, subsequent encounter    Fall against object   The patient presents with signs and symptoms consistent with diagnosis along with complaint of loss of balance  occasionally, significant weakness core musculature, single leg and tandem stance balance deficits, mild gait deficits, newer to the region and hasn't quite found a place that she chooses to exercises continuously, and avoidance of activities due to apprehension of balance issues    Pt prognosis is Good, , if patient is consistent and compliant with PT and home exercise program.   Pt will benefit from skilled outpatient Physical Therapy to address the deficits stated above and in the chart below, provide pt/family education, and to maximize pt's level of independence.     Plan of care discussed with patient: Yes  Pt's spiritual, cultural and educational needs considered and patient is agreeable to the plan of care and goals as stated below:     Anticipated Barriers for therapy: Anxiety or Panic Disorders, Arthritis, BMI over 30, Cancer, Depression,  Incontinence, Previous accidents, Sleep dysfunction    Medical Necessity is demonstrated by the following  History  Co-morbidities and personal factors that may impact the plan of care Co-morbidities:   Anxiety or Panic Disorders, Arthritis, BMI over 30, Cancer, Depression,  Incontinence, Previous accidents, Sleep dysfunction    Personal Factors:   no deficits     high   Examination  Body Structures and Functions, activity limitations and participation restrictions that may impact the plan of care Body Regions:   lower extremities  balance    Body Systems:    strength  balance  motor control  joint mobility, muscle tone, muscle length    Participation Restrictions:   See current level of function listed above     Activity limitations:   Learning and applying knowledge  no deficits    General Tasks and Commands  no deficits    Communication  no deficits    Mobility  lifting and carrying objects    Self care  no deficits    Domestic Life  doing house work (cleaning house, washing dishes, laundry)    Interactions/Relationships  no deficits    Life Areas  no  deficits    Community and Social Life  community life  recreation and leisure         moderate   Clinical Presentation evolving clinical presentation with changing clinical characteristics moderate   Decision Making/ Complexity Score: moderate     Goals: Reviewed:11/22/2023    Short Term Goals: In 4 weeks   1.Patient to be educated on HEP.  2.Patient single leg stance balance times to 5 seconds or greater    3.Patient to increase bilateral lower extremities strength to 4/5 for improved sit to stand from lower surfaces  4.Patient to have pain less than 2/10 at worst, to improve QOL.  5.Patient to improve score on the FOTO, to improve QOL.  6. Patient to demonstrate improved squat to 75%    Long Term Goals: In 8 weeks  1.Patient to improve score on the FOTO to 57 or greater, to improve QOL.  2. Patient to increase bilateral lower extremities strength to 4/5 for improved sit to stand from lower surfaces  3. Patient to have decreased pain to 2/10 at worst, to improve QOL.  4. Patient to demonstrate improved 5 second sit to stand score to 11 seconds  5. Patient single leg stance balance times to 5 seconds or greater    6. Patient to perform on and off the ground without assistance  5. Patient to perform daily activities without increased symptoms including.  Walking on grass or uneven surfaces  Prolonged walking without complaint of fatigue  Squat to ground without apprehension      PLAN     Plan of care Certification: 11/22/2023 to 2/22/2024.    Outpatient Physical Therapy 2 times weekly for 10 weeks to include the following interventions: Gait Training, Manual Therapy, Neuromuscular Re-ed, Patient Education, Self Care, Therapeutic Activities, and Therapeutic Exercise.     Connor Puckett, PT      I CERTIFY THE NEED FOR THESE SERVICES FURNISHED UNDER THIS PLAN OF TREATMENT AND WHILE UNDER MY CARE   Physician's comments:     Physician's Signature: ___________________________________________________

## 2023-11-25 PROBLEM — Z74.09 DECREASED STRENGTH, ENDURANCE, AND MOBILITY: Status: ACTIVE | Noted: 2023-11-25

## 2023-11-25 PROBLEM — R53.1 DECREASED STRENGTH, ENDURANCE, AND MOBILITY: Status: ACTIVE | Noted: 2023-11-25

## 2023-11-25 PROBLEM — R68.89 DECREASED STRENGTH, ENDURANCE, AND MOBILITY: Status: ACTIVE | Noted: 2023-11-25

## 2023-11-27 ENCOUNTER — CLINICAL SUPPORT (OUTPATIENT)
Dept: REHABILITATION | Facility: HOSPITAL | Age: 73
End: 2023-11-27
Payer: MEDICARE

## 2023-11-27 DIAGNOSIS — R53.1 DECREASED STRENGTH, ENDURANCE, AND MOBILITY: ICD-10-CM

## 2023-11-27 DIAGNOSIS — Z74.09 DECREASED STRENGTH, ENDURANCE, AND MOBILITY: ICD-10-CM

## 2023-11-27 DIAGNOSIS — R68.89 DECREASED STRENGTH, ENDURANCE, AND MOBILITY: ICD-10-CM
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
## 2023-11-27 DIAGNOSIS — R26.89 BALANCE PROBLEM: Primary | Chronic | ICD-10-CM

## 2023-11-27 PROCEDURE — 97112 NEUROMUSCULAR REEDUCATION: CPT

## 2023-11-27 PROCEDURE — 97110 THERAPEUTIC EXERCISES: CPT

## 2023-11-27 PROCEDURE — 97530 THERAPEUTIC ACTIVITIES: CPT

## 2023-11-27 NOTE — PROGRESS NOTES
[facility]   Physical Therapy Treatment Note        Name: Alison Lobo  Clinic Number: [id number]    Therapy Diagnosis:   Encounter Diagnoses   Name Primary?    Balance problem Yes    Fall against object     Decreased strength, endurance, and mobility      Physician: Roselia Gillette MD    Visit Date: 11/27/2023    Physician Orders: PT Eval and Treat   Medical Diagnosis from Referral:  Balance problem   Fall (on) (from) unspecified stairs and steps, subsequent encounter   Fall against object   Evaluation Date: 11/22/2023  Authorization Period Expiration: 10/23/2024  Plan of Care Expiration: 2/22/2024  Progress Note Due: 12/22/2023  Visit # / Visits authorized: 1/20 (+1)   FOTO: 1/ 3      Precautions: Standard and Fall    Time In: 12:45 pm  Time Out: 1:30 pm  Total Billable Time: 40 minutes      SUBJECTIVE     Pt reports: no pain was a little dizzy after head turns with exercises today, and with open books.  She was compliant with home exercise program.  Response to previous treatment: no change  Functional change: no change    Pain: 0/10  Location:      OBJECTIVE     Objective Measures updated at progress report unless specified.       TREATMENT     Alison received the treatments listed below:      therapeutic exercises to develop strength, endurance, ROM, flexibility, and core stabilization for 10 minutes including:      Intervention 11/27/2023   Parameters    Treadmill walking        Bike  x 7 min, L1   Standing strengthening exercises (add theraband as able)   Hip Flexion  Hip Extension  Hip Abd  Hip Add  Single leg heel raises   Shuttle x  5 Bands, 10x/ 3 sets                                            neuromuscular re-education activities to improve: Balance, Coordination, Proprioception, and Posture for 20 minutes. The following activities were included:    Intervention 11/27/2023  Parameters   Standing hand touches - at parallel bars x  x  x    x Tandem - 3x 15 sec  Single  leg stance  - 3 x 15sec  Tandem stance with vertical eye movements 10x eyes, 1 x each leg  Tandem stance with horizontal head movements 5x each (dizzy after)   Open book  x 7x each side DC dizzy after        bridge x 10x/ 2 sets        Sidelying hips series x  x Abduction 10x/2 sets  Circles 5x CW/CCW                              Alison participated in dynamic functional therapeutic activities to improve functional performance for 10  minutes, including:    Intervention 11/27/2023  Parameters   Sit to stand from lower surfaces x 2 sets / 9x        Agility/balance x  x  -  - Sidesteps 3 laps in parallel bars  Carioca 2 laps in parallel bars  Large forward steps  Backward walking                                                 Plan for Next Visit: progress as able, test vestibular for vertigo c/o's        PATIENT EDUCATION AND HOME EXERCISES     Home Exercises Provided and Patient Education Provided     Education provided:   - HEP    Written Home Exercises Provided: yes. Exercises were reviewed and Alison was able to demonstrate them prior to the end of the session.  Alison demonstrated good  understanding of the education provided. See EMR under Patient Instructions for exercises provided during therapy sessions      ASSESSMENT     Patient tolerated treatment well but did report some vertigo type symptoms after open books and with horizontal head turns today. Encouraged home exercise program,     Alison Is progressing well towards her goals.   Pt prognosis is Good.     Pt will continue to benefit from skilled outpatient physical therapy to address the deficits listed in the problem list box on initial evaluation, provide pt/family education and to maximize pt's level of independence in the home and community environment.     Pt's spiritual, cultural and educational needs considered and pt agreeable to plan of care and goals.     Anticipated barriers to physical therapy:  Anxiety or Panic Disorders, Arthritis,  BMI over 30, Cancer, Depression, Incontinence, Previous accidents, Sleep dysfunction    Goals:   Reviewed: 11/27/2023    Short Term Goals: In 4 weeks   1.Patient to be educated on HEP.  2.Patient single leg stance balance times to 5 seconds or greater    3.Patient to increase bilateral lower extremities strength to 4/5 for improved sit to stand from lower surfaces  4.Patient to have pain less than 2/10 at worst, to improve QOL.  5.Patient to improve score on the FOTO, to improve QOL.  6. Patient to demonstrate improved squat to 75%     Long Term Goals: In 8 weeks  1.Patient to improve score on the FOTO to 57 or greater, to improve QOL.  2. Patient to increase bilateral lower extremities strength to 4/5 for improved sit to stand from lower surfaces  3. Patient to have decreased pain to 2/10 at worst, to improve QOL.  4. Patient to demonstrate improved 5 second sit to stand score to 11 seconds  5. Patient single leg stance balance times to 5 seconds or greater    6. Patient to perform on and off the ground without assistance  5. Patient to perform daily activities without increased symptoms including.  Walking on grass or uneven surfaces  Prolonged walking without complaint of fatigue  Squat to ground without apprehension       PLAN     Monitor response to today's treatment session and progress with Physical Therapy plan of care as indicated.    Plan of care Certification: 11/22/2023 to 2/22/2024.     Outpatient Physical Therapy 2 times weekly for 10 weeks to include the following interventions: Gait Training, Manual Therapy, Neuromuscular Re-ed, Patient Education, Self Care, Therapeutic Activities, and Therapeutic Exercise.     Summer Garcia, PT

## 2023-11-28 ENCOUNTER — OFFICE VISIT (OUTPATIENT)
Dept: HEPATOLOGY | Facility: CLINIC | Age: 73
End: 2023-11-28
Payer: MEDICARE

## 2023-11-28 VITALS
BODY MASS INDEX: 32.81 KG/M2 | DIASTOLIC BLOOD PRESSURE: 81 MMHG | HEART RATE: 97 BPM | SYSTOLIC BLOOD PRESSURE: 129 MMHG | HEIGHT: 63 IN | WEIGHT: 185.19 LBS

## 2023-11-28 DIAGNOSIS — K76.0 HEPATIC FIBROSIS DUE TO NONALCOHOLIC FATTY LIVER DISEASE: ICD-10-CM

## 2023-11-28 DIAGNOSIS — E78.5 HYPERLIPIDEMIA, UNSPECIFIED HYPERLIPIDEMIA TYPE: Chronic | ICD-10-CM

## 2023-11-28 DIAGNOSIS — E66.09 CLASS 1 OBESITY DUE TO EXCESS CALORIES WITH BODY MASS INDEX (BMI) OF 33.0 TO 33.9 IN ADULT, UNSPECIFIED WHETHER SERIOUS COMORBIDITY PRESENT: Chronic | ICD-10-CM

## 2023-11-28 DIAGNOSIS — K74.00 HEPATIC FIBROSIS DUE TO NONALCOHOLIC FATTY LIVER DISEASE: ICD-10-CM

## 2023-11-28 DIAGNOSIS — K76.0 NAFLD (NONALCOHOLIC FATTY LIVER DISEASE): Primary | ICD-10-CM

## 2023-11-28 DIAGNOSIS — R73.03 PREDIABETES: Chronic | ICD-10-CM

## 2023-11-28 PROCEDURE — 99999 PR PBB SHADOW E&M-EST. PATIENT-LVL V: ICD-10-PCS | Mod: PBBFAC,,, | Performed by: NURSE PRACTITIONER

## 2023-11-28 PROCEDURE — 99999 PR PBB SHADOW E&M-EST. PATIENT-LVL V: CPT | Mod: PBBFAC,,, | Performed by: NURSE PRACTITIONER

## 2023-11-28 PROCEDURE — 99215 OFFICE O/P EST HI 40 MIN: CPT | Mod: PBBFAC | Performed by: NURSE PRACTITIONER

## 2023-11-28 PROCEDURE — 99214 PR OFFICE/OUTPT VISIT, EST, LEVL IV, 30-39 MIN: ICD-10-PCS | Mod: S$PBB,,, | Performed by: NURSE PRACTITIONER

## 2023-11-28 PROCEDURE — 99214 OFFICE O/P EST MOD 30 MIN: CPT | Mod: S$PBB,,, | Performed by: NURSE PRACTITIONER

## 2023-11-28 RX ORDER — ASPIRIN 325 MG
50 TABLET, DELAYED RELEASE (ENTERIC COATED) ORAL DAILY
COMMUNITY

## 2023-11-28 NOTE — PROGRESS NOTES
Clinic Consult:  [facility] Consultation Note    Reason for Consult:  The primary encounter diagnosis was NAFLD (nonalcoholic fatty liver disease). Diagnoses of Hepatic fibrosis due to nonalcoholic fatty liver disease, Hyperlipidemia, unspecified hyperlipidemia type, Class 1 obesity due to excess calories with body mass index (BMI) of 33.0 to 33.9 in adult, unspecified whether serious comorbidity present, and Prediabetes were also pertinent to this visit.    PCP: Roselia Gillette   [id number] / [place] [id number]    HPI:  This is a 73 y.o. female here for evaluation of the above  Pt referred by PCP for further evaluation of noted fatty liver on US and Fibroscan  Pt denies any previously know liver disease  LFTs were slightly elevated which prompted additional labs, US and Fibroscan  Labs are negative for autoimmune, cholestatic or viral hepatitis.   US showed steatosis.  Fibroscan with Severe steatosis with advanced fibrosis.   Denies any ETOH.   PMH includes HLD, Prediabetes and overweight.   Has been working on weight loss with improved nutrition.       Review of Systems   Constitutional:  Negative for chills, fever, malaise/fatigue and weight loss.   Respiratory:  Negative for cough.    Cardiovascular:  Negative for chest pain.   Gastrointestinal:         Per HPI   Musculoskeletal:  Negative for myalgias.   Skin:  Negative for itching and rash.   Neurological:  Negative for headaches.   Psychiatric/Behavioral:  The patient is not nervous/anxious.        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 in female, estrogen receptor positive 2023       Surgical History:  Past Surgical History:   Procedure Laterality Date    BREAST SURGERY       SECTION      HYSTERECTOMY         Family History:   Family History   Problem Relation Age of Onset    Hypertension Mother     Diabetes  Mother     Heart failure Mother     Angina Father        Social History:   Social History     Tobacco Use    Smoking status: Never    Smokeless tobacco: Never   Substance Use Topics    Alcohol use: Never    Drug use: Never       Allergies: Reviewed    Home Medications:   Current Outpatient Medications on File Prior to Visit   Medication Sig Dispense Refill    atorvastatin (LIPITOR) 20 MG tablet Take 1 tablet (20 mg total) by mouth once daily. 90 tablet 1    azelastine (ASTELIN) 137 mcg (0.1 %) nasal spray 1 spray (137 mcg total) by Nasal route 2 (two) times daily as needed for Rhinitis. 30 mL 5    buPROPion (WELLBUTRIN XL) 150 MG TB24 tablet Take 150 mg by mouth every morning.      co-enzyme Q-10 50 mg capsule Take 50 mg by mouth once daily.      levothyroxine (SYNTHROID) 25 MCG tablet Take 1 tablet (25 mcg total) by mouth before breakfast. 30 tablet 5    meloxicam (MOBIC) 7.5 MG tablet Take 2 tablets (15 mg total) by mouth once daily for 7 days, THEN 1 tablet (7.5 mg total) once daily. 374 tablet 0    metFORMIN (GLUCOPHAGE) 500 MG tablet Take 2.5 tablets (1,250 mg total) by mouth once daily. Takes 2.5 500mg  tablet 1    metoprolol succinate (TOPROL-XL) 50 MG 24 hr tablet TAKE 1 TABLET(50 MG) BY MOUTH EVERY DAY 30 tablet 11    montelukast (SINGULAIR) 10 mg tablet Take 1 tablet (10 mg total) by mouth every evening. 90 tablet 1    tamoxifen (NOLVADEX) 20 MG Tab Take 20 mg by mouth once daily.      venlafaxine (EFFEXOR-XR) 150 MG Cp24 Take 150 mg by mouth once daily. Takes 2 pills QD      vitamin D (VITAMIN D3) 1000 units Tab once daily.      blood pressure monitor (BLOOD PRESSURE KIT) Kit 1 kit by Misc.(Non-Drug; Combo Route) route Daily. (Patient not taking: Reported on 11/28/2023) 1 each 0    traMADoL (ULTRAM) 50 mg tablet Take 50 mg by mouth every 6 (six) hours as needed.      traZODone (DESYREL) 50 MG tablet Take 0.5 tablets (25 mg total) by mouth every evening. (Patient not taking: Reported on 11/28/2023)  "15 tablet 2     No current facility-administered medications on file prior to visit.       Physical Exam:  Vital Signs:  /81 (BP Location: Left arm, Patient Position: Sitting, BP Method: Medium (Automatic))   Pulse 97   Ht 5' 3" (1.6 m)   Wt 84 kg (185 lb 3 oz)   BMI 32.80 kg/m²   Body mass index is 32.8 kg/m².  Physical Exam  Vitals reviewed.   Constitutional:       Appearance: She is well-developed.   HENT:      Head: Normocephalic.   Eyes:      General: No scleral icterus.  Cardiovascular:      Rate and Rhythm: Normal rate.   Pulmonary:      Effort: Pulmonary effort is normal.   Abdominal:      General: There is no distension.      Palpations: Abdomen is soft.   Musculoskeletal:         General: Normal range of motion.      Cervical back: Normal range of motion.   Skin:     General: Skin is dry.   Neurological:      Mental Status: She is alert and oriented to person, place, and time.         Labs: Pertinent labs reviewed.      Assessment:  1. NAFLD (nonalcoholic fatty liver disease)    2. Hepatic fibrosis due to nonalcoholic fatty liver disease    3. Hyperlipidemia, unspecified hyperlipidemia type    4. Class 1 obesity due to excess calories with body mass index (BMI) of 33.0 to 33.9 in adult, unspecified whether serious comorbidity present    5. Prediabetes         Recommendations:  - Educated patient on spectrum of fatty liver disease and potential for cirrhosis if AVITIA present  - Advised weight loss (10%), strict glycemic control and lipid control (statins are ok if needed, prescribing doctor will need to monitor LFTs per routine)  - Mediterranean diet discussed  - given the advanced fibrosis, will plan for continued monitoring with labs and US every 6 months.     F/U 6 months with pre-clinic labs and imaging.         Thank you so much for allowing me to participate in the care of ABRIL Miradna  "

## 2023-11-29 ENCOUNTER — PATIENT MESSAGE (OUTPATIENT)
Dept: PRIMARY CARE CLINIC | Facility: CLINIC | Age: 73
End: 2023-11-29
Payer: MEDICARE

## 2023-11-30 ENCOUNTER — CLINICAL SUPPORT (OUTPATIENT)
Dept: REHABILITATION | Facility: HOSPITAL | Age: 73
End: 2023-11-30
Payer: MEDICARE

## 2023-11-30 DIAGNOSIS — R68.89 DECREASED STRENGTH, ENDURANCE, AND MOBILITY: ICD-10-CM
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
## 2023-11-30 DIAGNOSIS — Z74.09 DECREASED STRENGTH, ENDURANCE, AND MOBILITY: ICD-10-CM

## 2023-11-30 DIAGNOSIS — R53.1 DECREASED STRENGTH, ENDURANCE, AND MOBILITY: ICD-10-CM

## 2023-11-30 DIAGNOSIS — R26.89 BALANCE PROBLEM: Primary | Chronic | ICD-10-CM

## 2023-11-30 PROCEDURE — 97110 THERAPEUTIC EXERCISES: CPT | Mod: CQ

## 2023-11-30 PROCEDURE — 97112 NEUROMUSCULAR REEDUCATION: CPT | Mod: CQ

## 2023-11-30 NOTE — PROGRESS NOTES
[facility]   Physical Therapy Treatment Note        Name: Alison Lobo  Clinic Number: [id number]    Therapy Diagnosis:   Encounter Diagnoses   Name Primary?    Balance problem Yes    Fall against object     Decreased strength, endurance, and mobility      Physician: Roselia Gillette MD    Visit Date: 11/30/2023    Physician Orders: PT Eval and Treat   Medical Diagnosis from Referral:  Balance problem   Fall (on) (from) unspecified stairs and steps, subsequent encounter   Fall against object   Evaluation Date: 11/22/2023  Authorization Period Expiration: 10/23/2024  Plan of Care Expiration: 2/22/2024  Progress Note Due: 12/22/2023  Visit # / Visits authorized: 2/20 (+1)   FOTO: 1/ 3      Precautions: Standard and Fall    Time In: 1:15 pm  Time Out: 2:20 pm  Total Billable Time: 55 minutes      SUBJECTIVE     Pt reports: no complaints today     She was compliant with home exercise program.    Response to previous treatment: dizziness with open book exercise    Functional change: no change    Pain: 0/10  Location:  no pain today    OBJECTIVE     Objective Measures updated at progress report unless specified.       TREATMENT     Alison received the treatments listed below:      therapeutic exercises to develop strength, endurance, ROM, flexibility, and core stabilization for 25 minutes including:      Intervention 11/30/2023   Parameters    Treadmill walking        Bike  x 5 minutes    Standing strengthening exercises (add theraband as able) X  X  x      3 x 8 Hip Flexion yellow band bilateral   3 x 8 Hip Extension yellow band bilateral   3 x 8 Hip Abduction yellow band bilateral   Hip Adduction  Single leg heel raises   Shuttle   5 Bands, 10x/ 3 sets                                            neuromuscular re-education activities to improve: Balance, Coordination, Proprioception, and Posture for 30 minutes. The following activities were included:    Intervention 11/30/2023   Parameters   Standing hand touches - at parallel bars x         Tandem - 3 x 15 seconds   Single leg stance  - 3 x 15sec  Tandem stance with vertical eye movements 10x eyes, 1 x each leg  Tandem stance with horizontal head movements 5x each (dizzy after)   Open book   7x each side DC dizzy after        bridge x 10x/ 2 sets        Sidelying hips series x  x Abduction x 10   Circles x 10 clockwise/counter clockwise   X 10 forward/backward bicycles (added)                               Alison participated in dynamic functional therapeutic activities to improve functional performance for 0  minutes, including:    Intervention 11/30/2023  Parameters   Sit to stand from lower surfaces  2 sets / 9x        Agility/balance        Sidesteps 3 laps in parallel bars  Carioca 2 laps in parallel bars  Large forward steps  Backward walking                                                 Plan for Next Visit: progress as able, test vestibular for vertigo c/o's        PATIENT EDUCATION AND HOME EXERCISES     Home Exercises Provided and Patient Education Provided     Education provided:   - HOME EXERCISE PROGRAM  11/30/2023: added home exercise program to my chart and gave instructed how to access on my chart .    Written Home Exercises Provided: yes. Exercises were reviewed and Alison was able to demonstrate them prior to the end of the session.  Alison demonstrated good  understanding of the education provided. See EMR under Patient Instructions for exercises provided during therapy sessions      ASSESSMENT     Patient tolerated treatment well and open books was not performed due to above complaints of this exercise. She performed standing exercises with good posture with added resistance.     Alison Is progressing well towards her goals.   Pt prognosis is Good.     Pt will continue to benefit from skilled outpatient physical therapy to address the deficits listed in the problem list box on initial evaluation, provide  pt/family education and to maximize pt's level of independence in the home and community environment.     Pt's spiritual, cultural and educational needs considered and pt agreeable to plan of care and goals.     Anticipated barriers to physical therapy:  Anxiety or Panic Disorders, Arthritis, BMI over 30, Cancer, Depression, Incontinence, Previous accidents, Sleep dysfunction    Goals:   Reviewed: 11/30/2023    Short Term Goals: In 4 weeks   1.Patient to be educated on HEP.  2.Patient single leg stance balance times to 5 seconds or greater    3.Patient to increase bilateral lower extremities strength to 4/5 for improved sit to stand from lower surfaces  4.Patient to have pain less than 2/10 at worst, to improve QOL.  5.Patient to improve score on the FOTO, to improve QOL.  6. Patient to demonstrate improved squat to 75%     Long Term Goals: In 8 weeks  1.Patient to improve score on the FOTO to 57 or greater, to improve QOL.  2. Patient to increase bilateral lower extremities strength to 4/5 for improved sit to stand from lower surfaces  3. Patient to have decreased pain to 2/10 at worst, to improve QOL.  4. Patient to demonstrate improved 5 second sit to stand score to 11 seconds  5. Patient single leg stance balance times to 5 seconds or greater    6. Patient to perform on and off the ground without assistance  5. Patient to perform daily activities without increased symptoms including.  Walking on grass or uneven surfaces  Prolonged walking without complaint of fatigue  Squat to ground without apprehension       PLAN     Monitor response to today's treatment session and progress with Physical Therapy plan of care as indicated.    Plan of care Certification: 11/22/2023 to 2/22/2024.     Outpatient Physical Therapy 2 times weekly for 10 weeks to include the following interventions: Gait Training, Manual Therapy, Neuromuscular Re-ed, Patient Education, Self Care, Therapeutic Activities, and Therapeutic Exercise.      Ishan Eduardo, PTA

## 2023-12-04 ENCOUNTER — PATIENT MESSAGE (OUTPATIENT)
Dept: PRIMARY CARE CLINIC | Facility: CLINIC | Age: 73
End: 2023-12-04

## 2023-12-04 ENCOUNTER — CLINICAL SUPPORT (OUTPATIENT)
Dept: PRIMARY CARE CLINIC | Facility: CLINIC | Age: 73
End: 2023-12-04
Payer: MEDICARE

## 2023-12-04 DIAGNOSIS — F41.9 ANXIETY: Primary | ICD-10-CM

## 2023-12-04 PROCEDURE — 99499 UNLISTED E&M SERVICE: CPT | Mod: S$PBB,,,

## 2023-12-04 PROCEDURE — 99499 NO LOS: ICD-10-PCS | Mod: S$PBB,,,

## 2023-12-04 NOTE — PROGRESS NOTES
"Individual Psychotherapy ([facility])  Alison Lobo  12/4/2023  DATE:  12/4/2023  TYPE OF VISIT:  In person  LENGTH OF SESSION: 45    Therapeutic Intervention: Met with patient for individual psychotherapy.    Chief complaint/reason for encounter: depression, anxiety, sleep, and appetite       Session Content/Presenting Problem:   Patient continues discussion of estrangement from her daughter, Rachel. Says she is sad about Rachel rather than depressed about her. Says "I knew it was my fault.  I got feedback from friends that I did not expect."  Patient recalls she and Rachel being very close, perhaps too close. She then describes how different they are and how much difficulty she had accepting Rachel as she was.  She is aware that Rachel was angry with her; Rachel had stopped going to therapy with her.  Patient remembers good times, but is aware that Rachel was very affected by the divorce from her dad. Rachel has had issues with her dad and has cut him off as well.  For a long time patient felt she had to walk on eggshells with Rachel. They had had "challenge of the sepulveda" at times. She knew there was a possibility of Rachel cutting her off. Patient recalls Rachel saying if you ever talk to me like that I will never talk to you again. When Rachel said it was over, patient felt a sense of finality. Says the most bothersome thing is what Rachel said about her in her letter. She has never replied to Rachel's letter.  Says she looked at the letter, but could not bear it, so has never replied or addressed what Rachel said in the letter. She has provided the letter to [facility] for her to review.  Discusses memories of difficult times Rachel experienced in college. Patient has always felt Rachel did not live up to her potential in school and in choice of career. She did not want her to go away to college out of state, but Rachel insisted on leaving. Patient feels Rachel did not make good choices in the people " "she associated with.  Patient is experiencing deep sadness and regret over this situation. She is grateful that Rachel has allowed her to have a relationship with the grandkids. Is willing to believe this may be Rachel's way of saying she has not completely cut her off permanently.   Willing to look at ways of possibly mending the relationship.        Prior Session:  Patient discusses her goal of wanting to feel peace and relevance in her life. She says she has anhedonia. She says "I have been a human doing, not a human being." She wants to work on her relationships with others. Discussed her estrangement from her daughter, Rachel. Patient was told once that they were not a good parent/child fit.  She sees that she has had a hard time understanding her daughter. Rachel's life goals have always been different from hers. Rachel is very social and did not worry about grades. She was able to talk her way into an AP class. Agata was never very social, felt like the "outsider", and was very focused on grades and achievement.  Agata also views Rachel as feeling "entitled" - she asked to have her inheritance now, rather than waiting for Agata to die.  Patient is beginning to consider accepting Rachel as a person who is very different from herself. Discussed working on accepting Rachel as she is, rather than who she wanted her to be. Patient admits that when Rachel sent her a harsh letter, she did not read it and has never really read it since then. It was a shock to see and felt painful so she never has looked at it again.  Patient will consider looking at the letter again in an attempt to better understand her daughter's thoughts and feelings.      Current symptoms:  Depression: anhedonia, worthlessness/guilt, hopelessness, and decreased appetite.  Anxiety: excessive worrying and restlessness.  Insomnia: non-restful sleep.  Heidi:  denies.  Psychosis: denies .      Risk parameters:  Patient reports no suicidal " "ideation  Patient reports no homicidal ideation  Patient reports no self-injurious behavior  Patient reports no violent behavior    MENTAL HEALTH STATUS EXAM  General Appearance:  unremarkable, age appropriate   Speech: normal tone, normal rate, normal pitch, normal volume      Level of Cooperation: cooperative      Thought Processes: normal and logical   Mood: steady      Thought Content: normal, no suicidality, no homicidality, delusions, or paranoia   Affect: congruent and appropriate   Orientation: Oriented x3   Attention Span & Concentration: intact   Fund of General Knowledge: intact and appropriate to age and level of education   Judgment & Insight: good     Language  intact       STRENGTHS AND LIABILITIES: Strength: Patient accepts guidance/feedback, Strength: Patient is expressive/articulate., Strength: Patient has reasonable judgment., Strength: Patient is stable.    IMPRESSION:   My diagnostic impression is Anxiety disorders; generalized anxiety disorder [F41.1] and Major Depressive Disorder, Recurrent, Mild (F33.0), as evidenced by feeling down, depressed, feeling irritable, difficulty sleeping, poor appetite, worrying too much.     TREATMENT GOALS (per patient):    "To feel more relevant. To experience calmness and peacefulness." To explore the estrangement from her only child, Rachel.   Exercise, sleep better, increase enjoyable activities.     Treatment plan:  Target symptoms: recurrent depression, anxiety , adjustment  Why chosen therapy is appropriate versus another modality: relevant to diagnosis, patient responds to this modality, evidence based practice  Outcome monitoring methods: self-report, observation, checklist/rating scale  Therapeutic intervention type: insight oriented psychotherapy    Patient's response to intervention:  The patient's response to intervention is guarded.    Progress toward goals and other mental status changes:  The patient's progress toward goals is good.    Plan: Pt " plans to continue individual psychotherapy CBT will be utilized in future individual therapy sessions to increase interaction, insight, and support.     Return to clinic: 1 week  Dec. 11th

## 2023-12-11 ENCOUNTER — CLINICAL SUPPORT (OUTPATIENT)
Dept: PRIMARY CARE CLINIC | Facility: CLINIC | Age: 73
End: 2023-12-11
Payer: MEDICARE

## 2023-12-11 DIAGNOSIS — F33.1 MODERATE EPISODE OF RECURRENT MAJOR DEPRESSIVE DISORDER: Primary | ICD-10-CM

## 2023-12-11 PROCEDURE — 99499 UNLISTED E&M SERVICE: CPT | Mod: S$PBB,,,

## 2023-12-11 PROCEDURE — 99499 NO LOS: ICD-10-PCS | Mod: S$PBB,,,

## 2023-12-11 NOTE — PROGRESS NOTES
"Individual Psychotherapy ([facility])  Alison Lobo  12/11/2023  DATE:  12/11/2023  TYPE OF VISIT:  In person  LENGTH OF SESSION: 45    Therapeutic Intervention: Met with patient for individual psychotherapy.    Chief complaint/reason for encounter: depression, anxiety, sleep, and appetite       Session Content/Presenting Problem:    Discussed the letter patient received from her daughter Rachel.  Says: "I thought it was very cruel, I was angry, but then hurt. Very angry."  Was at a bad time - received the letter on the day of her surgery for breast cancer.  Rachel did not know about the second cancer diagnosis b/c they were not talking. They had not talked for a couple years.  Had sent Rachel a letter about 4 months before.   Patient was angry because she saw the letter as disrespectful; says Rachel often spoke to her very disrespectfully. Pt's good friend told Rachel that she was disrespectful and unappreciative.   Patient says "I remember her saying things and I did not know how to react to her."  Patient admits that there were times she was embarrassed to have a daughter with tattoos, head shaved. She felt judged because of her high profile position.  Patient is questioning herself:  Was my desire and ambition destructive in terms of this rltship.  Did I have something to prove. Rachel may have taken things more to heart than I meant. Patient discusses her ego and how acknowledgement of her accomplishments has always been very important to her.   She wants to feel relevant.  She submitted a resume to [employer] for an  position.   Patient will review certain statements in Rachel's letter and try to consider Rachel's point of view.     Prior Session:   Patient continues discussion of estrangement from her daughter, Rachel. Says she is sad about Rachel rather than depressed about her. Says "I knew it was my fault.  I got feedback from friends that I did not expect."  Patient recalls she " "and Rachel being very close, perhaps too close. She then describes how different they are and how much difficulty she had accepting Rachel as she was.  She is aware that Rachel was angry with her; Rachel had stopped going to therapy with her.  Patient remembers good times, but is aware that Rachel was very affected by the divorce from her dad. Rachel has had issues with her dad and has cut him off as well.  For a long time patient felt she had to walk on eggshells with Rachel. They had had "challenge of the sepulveda" at times. She knew there was a possibility of Rachel cutting her off. Patient recalls Rachel saying if you ever talk to me like that I will never talk to you again. When Rachel said it was over, patient felt a sense of finality. Says the most bothersome thing is what Rachel said about her in her letter. She has never replied to Rachel's letter.  Says she looked at the letter, but could not bear it, so has never replied or addressed what Rachel said in the letter. She has provided the letter to [facility] for her to review.  Discusses memories of difficult times Rachel experienced in college. Patient has always felt Rachel did not live up to her potential in school and in choice of career. She did not want her to go away to college out of state, but Rachel insisted on leaving. Patient feels Rachel did not make good choices in the people she associated with.  Patient is experiencing deep sadness and regret over this situation. She is grateful that Rachel has allowed her to have a relationship with the grandkids. Is willing to believe this may be Rachel's way of saying she has not completely cut her off permanently.   Willing to look at ways of possibly mending the relationship.       Current symptoms:  Depression: anhedonia, worthlessness/guilt, hopelessness, and decreased appetite.  Anxiety: excessive worrying and restlessness.  Insomnia: non-restful sleep.  Heidi:  denies.  Psychosis: denies " ".      Risk parameters:  Patient reports no suicidal ideation  Patient reports no homicidal ideation  Patient reports no self-injurious behavior  Patient reports no violent behavior    MENTAL HEALTH STATUS EXAM  General Appearance:  unremarkable, age appropriate   Speech: normal tone, normal rate, normal pitch, normal volume      Level of Cooperation: cooperative      Thought Processes: normal and logical   Mood: steady      Thought Content: normal, no suicidality, no homicidality, delusions, or paranoia   Affect: congruent and appropriate   Orientation: Oriented x3   Attention Span & Concentration: intact   Fund of General Knowledge: intact and appropriate to age and level of education   Judgment & Insight: good     Language  intact       STRENGTHS AND LIABILITIES: Strength: Patient accepts guidance/feedback, Strength: Patient is expressive/articulate., Strength: Patient has reasonable judgment., Strength: Patient is stable.    IMPRESSION:   My diagnostic impression is Anxiety disorders; generalized anxiety disorder [F41.1] and Major Depressive Disorder, Recurrent, Mild (F33.0), as evidenced by feeling down, depressed, feeling irritable, difficulty sleeping, poor appetite, worrying too much.     TREATMENT GOALS (per patient):    "To feel more relevant. To experience calmness and peacefulness." To explore the estrangement from her only child, Rachel.   Exercise, sleep better, increase enjoyable activities.     Treatment plan:  Target symptoms: recurrent depression, anxiety , adjustment  Why chosen therapy is appropriate versus another modality: relevant to diagnosis, patient responds to this modality, evidence based practice  Outcome monitoring methods: self-report, observation, checklist/rating scale  Therapeutic intervention type: insight oriented psychotherapy    Patient's response to intervention:  The patient's response to intervention is guarded.    Progress toward goals and other mental status changes:  The " patient's progress toward goals is good.    Plan: Pt plans to continue individual psychotherapy CBT will be utilized in future individual therapy sessions to increase interaction, insight, and support.     Return to clinic: 3 weeks January 3rd

## 2023-12-12 ENCOUNTER — TELEPHONE (OUTPATIENT)
Dept: REHABILITATION | Facility: HOSPITAL | Age: 73
End: 2023-12-12
Payer: MEDICARE

## 2023-12-18 ENCOUNTER — CLINICAL SUPPORT (OUTPATIENT)
Dept: REHABILITATION | Facility: HOSPITAL | Age: 73
End: 2023-12-18
Payer: MEDICARE

## 2023-12-18 DIAGNOSIS — R53.1 DECREASED STRENGTH, ENDURANCE, AND MOBILITY: ICD-10-CM

## 2023-12-18 DIAGNOSIS — R26.89 BALANCE PROBLEM: Primary | Chronic | ICD-10-CM
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
## 2023-12-18 DIAGNOSIS — Z74.09 DECREASED STRENGTH, ENDURANCE, AND MOBILITY: ICD-10-CM

## 2023-12-18 DIAGNOSIS — R68.89 DECREASED STRENGTH, ENDURANCE, AND MOBILITY: ICD-10-CM

## 2023-12-18 PROCEDURE — 97110 THERAPEUTIC EXERCISES: CPT

## 2023-12-18 PROCEDURE — 97530 THERAPEUTIC ACTIVITIES: CPT

## 2023-12-18 PROCEDURE — 97112 NEUROMUSCULAR REEDUCATION: CPT

## 2023-12-18 NOTE — PROGRESS NOTES
[facility]   Physical Therapy Treatment Note        Name: Alison Lobo  Clinic Number: [id number]    Therapy Diagnosis:   Encounter Diagnoses   Name Primary?    Balance problem Yes    Fall against object     Decreased strength, endurance, and mobility      Physician: Roselia Gillette MD    Visit Date: 12/18/2023    Physician Orders: PT Eval and Treat   Medical Diagnosis from Referral:  Balance problem   Fall (on) (from) unspecified stairs and steps, subsequent encounter   Fall against object   Evaluation Date: 11/22/2023  Authorization Period Expiration: 10/23/2024  Plan of Care Expiration: 2/22/2024  Progress Note Due: 12/22/2023  Visit # / Visits authorized: 3/20 (+1)   FOTO: 1/ 3      Precautions: Standard and Fall    Time In: 1300  Time Out: 1400  Total Billable Time: 60 minutes      SUBJECTIVE     Pt reports: no complaints today     She was compliant with home exercise program.    Response to previous treatment: dizziness with open book exercise    Functional change: no change    Pain: 0/10  Location:  no pain today    OBJECTIVE     Objective Measures updated at progress report unless specified.       Functional Tests  Outcome 12/18/2023 Norms   Timed Up and Go 8.3 NT <13.5 sec   30 second Sit to Stand 12 12 Age Male Female   60-64 <14 <12   65-69 <12 <11   70-74 <12 <10   75-79 <11 <10   80-84 <10 <9   85-89 <8 <8   90-93 <7 <4      Five Time Sit to Stand 12.7 13.4 60s: <11.4 sec  70s: <12.6 sec  80s: 14.8 sec   6 minutes walk NT NT 60s: >538f, 572m  70s: >471f, 527m  80s: >417f, 392m           TREATMENT     Alison received the treatments listed below:      therapeutic exercises to develop strength, endurance, ROM, flexibility, and core stabilization for 30 minutes including:      Intervention 12/18/2023   Parameters    Treadmill walking   x  2.5 mph x 12 m   Bike   5 minutes    Standing strengthening exercises (add theraband as able) X  X  x     x Hip Flexion - red  theraband - 2x15  Hip Extension red theraband - 2x15   Hip Abduction red theraband - 2x15   Hip Adduction  Single leg heel raises   Shuttle   5 Bands, 10x/ 3 sets   Squat   x Picking up 6# medicine ball from the floor - 2x10                                      neuromuscular re-education activities to improve: Balance, Coordination, Proprioception, and Posture for 15 minutes. The following activities were included:    Intervention 12/18/2023  Parameters   Standing hand touches - at parallel bars   x    x     Tandem - 3 x 15 seconds   Single leg stance  - 3 x 30 - with vertical eye movement  Single leg stance - 3x30 with horizontal eye movement  Tandem stance with vertical eye movements 10x eyes, 1 x each leg  Tandem stance with horizontal head movements 5x each (dizzy after)   Open book   7x each side DC dizzy after        bridge  10x/ 2 sets        Sidelying hips series  Abduction x 10   Circles x 10 clockwise/counter clockwise   X 10 forward/backward bicycles (added)    High hurdles x Balance walking over hurdles - 4 passes                         Alison participated in dynamic functional therapeutic activities to improve functional performance for 15  minutes, including:    Intervention 12/18/2023  Parameters   Sit to stand from lower surfaces  2 sets / 9x        Agility/balance        Sidesteps 3 laps in parallel bars  Carioca 2 laps in parallel bars  Large forward steps  Backward walking   Step downs - 6 inch box - trying to use one hand x 2x10   Matrix x Knee Extension - 2x10 - 15lbs plus one small weight - 2x15                                       Plan for Next Visit: progress as able, test vestibular for vertigo c/o's        PATIENT EDUCATION AND HOME EXERCISES     Home Exercises Provided and Patient Education Provided     Education provided:   - HOME EXERCISE PROGRAM  11/30/2023: added home exercise program to my chart and gave instructed how to access on my chart .    Written Home Exercises Provided:  yes. Exercises were reviewed and Alison was able to demonstrate them prior to the end of the session.  Alison demonstrated good  understanding of the education provided. See EMR under Patient Instructions for exercises provided during therapy sessions      ASSESSMENT     Patient reports that she has gone to  gym of [facility] a few times.  Spoke with patient about the importance of daily walks for 12-15 minutes.  Also spoke of the importance of going to the gym a few times per week.  Good performance with exercises.  Still some problems with walking balance activities such as marzena, ascending/descending steps, and single leg stance activities.    Alison Is progressing well towards her goals.   Pt prognosis is Good.     Pt will continue to benefit from skilled outpatient physical therapy to address the deficits listed in the problem list box on initial evaluation, provide pt/family education and to maximize pt's level of independence in the home and community environment.     Pt's spiritual, cultural and educational needs considered and pt agreeable to plan of care and goals.     Anticipated barriers to physical therapy:  Anxiety or Panic Disorders, Arthritis, BMI over 30, Cancer, Depression, Incontinence, Previous accidents, Sleep dysfunction    Goals:   Reviewed: 12/18/2023    Short Term Goals: In 4 weeks   1.Patient to be educated on HEP. - met  2.Patient single leg stance balance times to 5 seconds or greater  PC  3.Patient to increase bilateral lower extremities strength to 4/5 for improved sit to stand from lower surfaces PC  4.Patient to have pain less than 2/10 at worst, to improve QOL. met  5.Patient to improve score on the FOTO, to improve QOL. PC  6. Patient to demonstrate improved squat to 75% PC     Long Term Goals: In 8 weeks  1.Patient to improve score on the FOTO to 57 or greater, to improve QOL. PC  2. Patient to increase bilateral lower extremities strength to 4/5 for improved sit to stand  from lower surfaces PC  3. Patient to have decreased pain to 2/10 at worst, to improve QOL. met  4. Patient to demonstrate improved 5 second sit to stand score to 11 seconds PC  5. Patient single leg stance balance times to 5 seconds or greater  PC  6. Patient to perform on and off the ground without assistance. met  5. Patient to perform daily activities without increased symptoms including.  Walking on grass or uneven surfaces met  Prolonged walking without complaint of fatigue PC  Squat to ground without apprehension PC       PLAN     Monitor response to today's treatment session and progress with Physical Therapy plan of care as indicated.    Plan of care Certification: 11/22/2023 to 2/22/2024.     Outpatient Physical Therapy 2 times weekly for 10 weeks to include the following interventions: Gait Training, Manual Therapy, Neuromuscular Re-ed, Patient Education, Self Care, Therapeutic Activities, and Therapeutic Exercise.     Connor Puckett, PT

## 2023-12-20 ENCOUNTER — SOCIAL WORK (OUTPATIENT)
Dept: PRIMARY CARE CLINIC | Facility: CLINIC | Age: 73
End: 2023-12-20
Payer: MEDICARE

## 2023-12-20 ENCOUNTER — CLINICAL SUPPORT (OUTPATIENT)
Dept: REHABILITATION | Facility: HOSPITAL | Age: 73
End: 2023-12-20
Payer: MEDICARE

## 2023-12-20 DIAGNOSIS — Z74.09 DECREASED STRENGTH, ENDURANCE, AND MOBILITY: ICD-10-CM

## 2023-12-20 DIAGNOSIS — R68.89 DECREASED STRENGTH, ENDURANCE, AND MOBILITY: ICD-10-CM
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
## 2023-12-20 DIAGNOSIS — R26.89 BALANCE PROBLEM: Primary | Chronic | ICD-10-CM

## 2023-12-20 DIAGNOSIS — R53.1 DECREASED STRENGTH, ENDURANCE, AND MOBILITY: ICD-10-CM

## 2023-12-20 DIAGNOSIS — F41.9 ANXIETY: Primary | ICD-10-CM

## 2023-12-20 PROCEDURE — 99499 UNLISTED E&M SERVICE: CPT | Mod: S$PBB,,,

## 2023-12-20 PROCEDURE — 97530 THERAPEUTIC ACTIVITIES: CPT

## 2023-12-20 PROCEDURE — 97110 THERAPEUTIC EXERCISES: CPT

## 2023-12-20 PROCEDURE — 99499 NO LOS: ICD-10-PCS | Mod: S$PBB,,,

## 2023-12-20 NOTE — PROGRESS NOTES
Discussed patient's current course of therapy with Nichole Coppola. Medications reviewed, as well as therapeutic goals and possible outcomes.

## 2023-12-20 NOTE — PROGRESS NOTES
[facility]   Physical Therapy Treatment Note        Name: Alison Lobo  Clinic Number: [id number]    Therapy Diagnosis:   Encounter Diagnoses   Name Primary?    Balance problem Yes    Fall against object     Decreased strength, endurance, and mobility      Physician: Roselia Gillette MD    Visit Date: 12/20/2023    Physician Orders: PT Eval and Treat   Medical Diagnosis from Referral:  Balance problem   Fall (on) (from) unspecified stairs and steps, subsequent encounter   Fall against object   Evaluation Date: 11/22/2023  Authorization Period Expiration: 10/23/2024  Plan of Care Expiration: 2/22/2024  Progress Note Due: 12/22/2023  Visit # / Visits authorized: 4/20 (+1)   FOTO: 1/3      Precautions: Standard and Fall    Time In: 10:45 am  Time Out: 11:30 am  Total Billable Time: 60 minutes    SUBJECTIVE     Pt reports: no pain today, was sore after last visit. She reports that she changed to the Mediterranean diet and has noticed a big change in pain levels.     She was compliant with home exercise program.    Response to previous treatment: dizziness with open book exercise    Functional change: no change    Pain: 0/10  Location:  no pain today    OBJECTIVE     Objective Measures updated at progress report unless specified.     TREATMENT     Alison received the treatments listed below:      therapeutic exercises to develop strength, endurance, ROM, flexibility, and core stabilization for 30 minutes including:      Intervention 12/20/2023   Parameters    Treadmill walking   x  2.5 mph x 12 min (warm up for 1 min)   Bike   5 minutes    Standing strengthening exercises (add theraband as able) x  x  x    - Hip Flexion - red theraband - 2x15  Hip Extension red theraband - 2x15   Hip Abduction red theraband - 2x15   Hip Adduction  Single leg heel raises   Shuttle   5 Bands, 10x/ 3 sets   Squat   - Picking up 6# medicine ball from the floor - 2x10                                       neuromuscular re-education activities to improve: Balance, Coordination, Proprioception, and Posture for 6 minutes. The following activities were included:    Intervention 12/20/2023  Parameters   Standing hand touches - at parallel bars   x    x     Tandem - 3 x 15 seconds   Single leg stance  - 3 x 30 - with vertical eye movement  Single leg stance - 3x30 with horizontal eye movement  Tandem stance with vertical eye movements 10x eyes, 1 x each leg  Tandem stance with horizontal head movements 5x each (dizzy after)   Open book   7x each side DC dizzy after        bridge  10x/ 2 sets        Sidelying hips series  Abduction x 10   Circles x 10 clockwise/counter clockwise   X 10 forward/backward bicycles (added)    High hurdles - Balance walking over hurdles - 4 passes                         Alison participated in dynamic functional therapeutic activities to improve functional performance for 8  minutes, including:    Intervention 12/20/2023  Parameters   Sit to stand from lower surfaces  2 sets / 9x        Agility/balance        Sidesteps 3 laps in parallel bars  Carioca 2 laps in parallel bars  Large forward steps  Backward walking   Step downs - 6 inch box - trying to use one hand - 2x10   Step ups  x 10x/ 6in step each leg - single hand support   Matrix x Knee Extension 15lbs plus one small weight - 2x15                                       Plan for Next Visit: progress as able        PATIENT EDUCATION AND HOME EXERCISES     Home Exercises Provided and Patient Education Provided     Education provided:   - HOME EXERCISE PROGRAM  11/30/2023: added home exercise program to my chart and gave instructed how to access on my chart .    Written Home Exercises Provided: Patient instructed to cont prior HEP. Exercises were reviewed and Alison was able to demonstrate them prior to the end of the session.  Alison demonstrated good  understanding of the education provided. See EMR under Patient Instructions for  exercises provided during therapy sessions      ASSESSMENT     Patient reports increased fatigue after last treatment session, encouraged home exercise program and balance activities with hand support at counter. Cues throughout treatment session for technique as indicated for substitution and technique with all activities.    Alison Is progressing well towards her goals.   Pt prognosis is Good.     Pt will continue to benefit from skilled outpatient physical therapy to address the deficits listed in the problem list box on initial evaluation, provide pt/family education and to maximize pt's level of independence in the home and community environment.     Pt's spiritual, cultural and educational needs considered and pt agreeable to plan of care and goals.     Anticipated barriers to physical therapy:  Anxiety or Panic Disorders, Arthritis, BMI over 30, Cancer, Depression, Incontinence, Previous accidents, Sleep dysfunction    Goals:   Reviewed: 12/20/2023    Short Term Goals: In 4 weeks   1.Patient to be educated on HEP. - met  2.Patient single leg stance balance times to 5 seconds or greater  PC  3.Patient to increase bilateral lower extremities strength to 4/5 for improved sit to stand from lower surfaces PC  4.Patient to have pain less than 2/10 at worst, to improve QOL. met  5.Patient to improve score on the FOTO, to improve QOL. PC  6. Patient to demonstrate improved squat to 75% PC     Long Term Goals: In 8 weeks  1.Patient to improve score on the FOTO to 57 or greater, to improve QOL. PC  2. Patient to increase bilateral lower extremities strength to 4/5 for improved sit to stand from lower surfaces PC  3. Patient to have decreased pain to 2/10 at worst, to improve QOL. met  4. Patient to demonstrate improved 5 second sit to stand score to 11 seconds PC  5. Patient single leg stance balance times to 5 seconds or greater  PC  6. Patient to perform on and off the ground without assistance. met  5. Patient to  perform daily activities without increased symptoms including.  Walking on grass or uneven surfaces met  Prolonged walking without complaint of fatigue PC  Squat to ground without apprehension PC       PLAN     Monitor response to today's treatment session and progress with Physical Therapy plan of care as indicated.    Plan of care Certification: 11/22/2023 to 2/22/2024.     Outpatient Physical Therapy 2 times weekly for 10 weeks to include the following interventions: Gait Training, Manual Therapy, Neuromuscular Re-ed, Patient Education, Self Care, Therapeutic Activities, and Therapeutic Exercise.     Summer Garcia, PT

## 2023-12-26 ENCOUNTER — DOCUMENTATION ONLY (OUTPATIENT)
Dept: REHABILITATION | Facility: HOSPITAL | Age: 73
End: 2023-12-26
Payer: MEDICARE

## 2023-12-26 NOTE — PROGRESS NOTES
PT/PTA met face to face to discuss pt's treatment plan and progress towards established goals. Pt will be seen by a physical therapist minimally every 6th visit or every 30 days.        Ishan Eduardo PTA

## 2023-12-27 DIAGNOSIS — Z00.00 ROUTINE HEALTH MAINTENANCE: Primary | ICD-10-CM

## 2023-12-28 ENCOUNTER — CLINICAL SUPPORT (OUTPATIENT)
Dept: REHABILITATION | Facility: HOSPITAL | Age: 73
End: 2023-12-28
Payer: MEDICARE

## 2023-12-28 ENCOUNTER — HOSPITAL ENCOUNTER (OUTPATIENT)
Dept: CARDIOLOGY | Facility: HOSPITAL | Age: 73
Discharge: HOME | End: 2023-12-28
Attending: INTERNAL MEDICINE
Payer: MEDICARE

## 2023-12-28 ENCOUNTER — OFFICE VISIT (OUTPATIENT)
Dept: CARDIOLOGY | Facility: CLINIC | Age: 73
End: 2023-12-28
Payer: MEDICARE

## 2023-12-28 VITALS
OXYGEN SATURATION: 97 % | DIASTOLIC BLOOD PRESSURE: 62 MMHG | HEART RATE: 80 BPM | WEIGHT: 184.75 LBS | BODY MASS INDEX: 32.73 KG/M2 | SYSTOLIC BLOOD PRESSURE: 122 MMHG

## 2023-12-28 DIAGNOSIS — Z00.00 ROUTINE HEALTH MAINTENANCE: ICD-10-CM

## 2023-12-28 DIAGNOSIS — E66.09 CLASS 1 OBESITY DUE TO EXCESS CALORIES WITH BODY MASS INDEX (BMI) OF 33.0 TO 33.9 IN ADULT, UNSPECIFIED WHETHER SERIOUS COMORBIDITY PRESENT: Chronic | ICD-10-CM

## 2023-12-28 DIAGNOSIS — E78.49 OTHER HYPERLIPIDEMIA: Chronic | ICD-10-CM

## 2023-12-28 DIAGNOSIS — R26.89 BALANCE PROBLEM: Primary | ICD-10-CM

## 2023-12-28 DIAGNOSIS — R00.0 SINUS TACHYCARDIA: Primary | ICD-10-CM

## 2023-12-28 PROCEDURE — 99214 OFFICE O/P EST MOD 30 MIN: CPT | Mod: S$PBB,,, | Performed by: INTERNAL MEDICINE

## 2023-12-28 PROCEDURE — 93005 ELECTROCARDIOGRAM TRACING: CPT

## 2023-12-28 PROCEDURE — 99999 PR PBB SHADOW E&M-EST. PATIENT-LVL III: CPT | Mod: PBBFAC,,, | Performed by: INTERNAL MEDICINE

## 2023-12-28 PROCEDURE — 99214 PR OFFICE/OUTPT VISIT, EST, LEVL IV, 30-39 MIN: ICD-10-PCS | Mod: S$PBB,,, | Performed by: INTERNAL MEDICINE

## 2023-12-28 PROCEDURE — 99213 OFFICE O/P EST LOW 20 MIN: CPT | Mod: PBBFAC | Performed by: INTERNAL MEDICINE

## 2023-12-28 PROCEDURE — 93010 EKG 12-LEAD: ICD-10-PCS | Mod: ,,, | Performed by: INTERNAL MEDICINE

## 2023-12-28 PROCEDURE — 93010 ELECTROCARDIOGRAM REPORT: CPT | Mod: ,,, | Performed by: INTERNAL MEDICINE

## 2023-12-28 PROCEDURE — 99999 PR PBB SHADOW E&M-EST. PATIENT-LVL III: ICD-10-PCS | Mod: PBBFAC,,, | Performed by: INTERNAL MEDICINE

## 2023-12-28 NOTE — PROGRESS NOTES
[facility]   Physical Therapy Treatment Note        Name: Alison Lobo  Clinic Number: [id number]    Therapy Diagnosis:   No diagnosis found.    Physician: Roselia Gillette MD    Visit Date: 12/28/2023    Physician Orders: PT Eval and Treat   Medical Diagnosis from Referral:  Balance problem   Fall (on) (from) unspecified stairs and steps, subsequent encounter   Fall against object   Evaluation Date: 11/22/2023  Authorization Period Expiration: 10/23/2024  Plan of Care Expiration: 2/22/2024  Progress Note Due: 12/22/2023  Visit # / Visits authorized: 4/20 (+1)   FOTO: 1/3      Precautions: Standard and Fall    Time In: 0  Time Out: 0  Total Billable Time: 0 minutes    SUBJECTIVE     Pt reports: that she has a cardiology appointment today and was a bit late into this appointment.  Canceled this appointment too late    She was compliant with home exercise program.    Response to previous treatment: dizziness with open book exercise    Functional change: no change    Pain: 0/10  Location:  no pain today    OBJECTIVE     Objective Measures updated at progress report unless specified.     TREATMENT     Alison received the treatments listed below:      therapeutic exercises to develop strength, endurance, ROM, flexibility, and core stabilization for 30 minutes including:      Intervention 12/28/2023   Parameters    Treadmill walking   x  2.5 mph x 12 min (warm up for 1 min)   Bike   5 minutes    Standing strengthening exercises (add theraband as able) x  x  x    - Hip Flexion - red theraband - 2x15  Hip Extension red theraband - 2x15   Hip Abduction red theraband - 2x15   Hip Adduction  Single leg heel raises   Shuttle   5 Bands, 10x/ 3 sets   Squat   - Picking up 6# medicine ball from the floor - 2x10                                      neuromuscular re-education activities to improve: Balance, Coordination, Proprioception, and Posture for 6 minutes. The following activities were  included:    Intervention 12/28/2023  Parameters   Standing hand touches - at parallel bars   x    x     Tandem - 3 x 15 seconds   Single leg stance  - 3 x 30 - with vertical eye movement  Single leg stance - 3x30 with horizontal eye movement  Tandem stance with vertical eye movements 10x eyes, 1 x each leg  Tandem stance with horizontal head movements 5x each (dizzy after)   Open book   7x each side DC dizzy after        bridge  10x/ 2 sets        Sidelying hips series  Abduction x 10   Circles x 10 clockwise/counter clockwise   X 10 forward/backward bicycles (added)    High hurdles - Balance walking over hurdles - 4 passes                         Alison participated in dynamic functional therapeutic activities to improve functional performance for 8  minutes, including:    Intervention 12/28/2023  Parameters   Sit to stand from lower surfaces  2 sets / 9x        Agility/balance        Sidesteps 3 laps in parallel bars  Carioca 2 laps in parallel bars  Large forward steps  Backward walking   Step downs - 6 inch box - trying to use one hand - 2x10   Step ups  x 10x/ 6in step each leg - single hand support   Matrix x Knee Extension 15lbs plus one small weight - 2x15                                       Plan for Next Visit: progress as able        PATIENT EDUCATION AND HOME EXERCISES     Home Exercises Provided and Patient Education Provided     Education provided:   - HOME EXERCISE PROGRAM  11/30/2023: added home exercise program to my chart and gave instructed how to access on my chart .    Written Home Exercises Provided: Patient instructed to cont prior HEP. Exercises were reviewed and Alison was able to demonstrate them prior to the end of the session.  Alison demonstrated good  understanding of the education provided. See EMR under Patient Instructions for exercises provided during therapy sessions      ASSESSMENT     Patient reports increased fatigue after last treatment session, encouraged home exercise  program and balance activities with hand support at counter. Cues throughout treatment session for technique as indicated for substitution and technique with all activities.    Alison Is progressing well towards her goals.   Pt prognosis is Good.     Pt will continue to benefit from skilled outpatient physical therapy to address the deficits listed in the problem list box on initial evaluation, provide pt/family education and to maximize pt's level of independence in the home and community environment.     Pt's spiritual, cultural and educational needs considered and pt agreeable to plan of care and goals.     Anticipated barriers to physical therapy:  Anxiety or Panic Disorders, Arthritis, BMI over 30, Cancer, Depression, Incontinence, Previous accidents, Sleep dysfunction    Goals:   Reviewed: 12/28/2023    Short Term Goals: In 4 weeks   1.Patient to be educated on HEP. - met  2.Patient single leg stance balance times to 5 seconds or greater  PC  3.Patient to increase bilateral lower extremities strength to 4/5 for improved sit to stand from lower surfaces PC  4.Patient to have pain less than 2/10 at worst, to improve QOL. met  5.Patient to improve score on the FOTO, to improve QOL. PC  6. Patient to demonstrate improved squat to 75% PC     Long Term Goals: In 8 weeks  1.Patient to improve score on the FOTO to 57 or greater, to improve QOL. PC  2. Patient to increase bilateral lower extremities strength to 4/5 for improved sit to stand from lower surfaces PC  3. Patient to have decreased pain to 2/10 at worst, to improve QOL. met  4. Patient to demonstrate improved 5 second sit to stand score to 11 seconds PC  5. Patient single leg stance balance times to 5 seconds or greater  PC  6. Patient to perform on and off the ground without assistance. met  5. Patient to perform daily activities without increased symptoms including.  Walking on grass or uneven surfaces met  Prolonged walking without complaint of fatigue  PC  Squat to ground without apprehension PC       PLAN     Monitor response to today's treatment session and progress with Physical Therapy plan of care as indicated.    Plan of care Certification: 11/22/2023 to 2/22/2024.     Outpatient Physical Therapy 2 times weekly for 10 weeks to include the following interventions: Gait Training, Manual Therapy, Neuromuscular Re-ed, Patient Education, Self Care, Therapeutic Activities, and Therapeutic Exercise.     Connor Puckett, PT

## 2023-12-28 NOTE — PROGRESS NOTES
Subjective:   Patient ID:  Alison Lobo is a 73 y.o. female who presents for follow up of No chief complaint on file.      73 yo female, 1 yr f/u. Retired.  PMH chronic tachycardia, h/o breast Ca in  and , lumpectomy and XRT Rx on both sides, no chemo. HLD, obesity, preDM, JADA back to CPAP, no h/o MI CVA.  Tachy for years since .   She denied chest pain, dyspnea on exertion, palpitation, fainting, PND, orthopnea, syncope and claudication.   Exercise 3 times a week at [employer]. No smoking. Occasional drinking    Interval history  Lives with her cat.  Some vertigo and fall, resolved after PT  She denied chest pain, dyspnea on exertion, palpitation, fainting, PND, orthopnea, syncope and claudication. EKG NSR. Tachycardia resolved                         Past Medical History:   Diagnosis Date    Cancer     Hyperlipidemia     Malignant neoplasm of upper-outer quadrant of right breast in female, estrogen receptor positive 2023       Past Surgical History:   Procedure Laterality Date    BREAST SURGERY       SECTION      HYSTERECTOMY         Social History     Tobacco Use    Smoking status: Never    Smokeless tobacco: Never   Substance Use Topics    Alcohol use: Never    Drug use: Never       Family History   Problem Relation Age of Onset    Hypertension Mother     Diabetes Mother     Heart failure Mother     Angina Father          ROS    Objective:   Physical Exam  HENT:      Head: Normocephalic.   Eyes:      Pupils: Pupils are equal, round, and reactive to light.   Neck:      Thyroid: No thyromegaly.      Vascular: Normal carotid pulses. No carotid bruit or JVD.   Cardiovascular:      Rate and Rhythm: Normal rate and regular rhythm. No extrasystoles are present.     Chest Wall: PMI is not displaced.      Pulses: Normal pulses.      Heart sounds: Normal heart sounds. No murmur heard.     No gallop. No S3 sounds.   Pulmonary:      Effort: No respiratory distress.      Breath sounds: Normal breath  "sounds. No stridor.   Abdominal:      General: Bowel sounds are normal.      Palpations: Abdomen is soft.      Tenderness: There is no abdominal tenderness. There is no rebound.   Skin:     Findings: No rash.   Neurological:      Mental Status: She is alert and oriented to person, place, and time.   Psychiatric:         Behavior: Behavior normal.         Lab Results   Component Value Date    CHOL 138 11/07/2022     Lab Results   Component Value Date    HDL 41 11/07/2022     Lab Results   Component Value Date    LDLCALC 67.0 11/07/2022     Lab Results   Component Value Date    TRIG 150 11/07/2022     Lab Results   Component Value Date    CHOLHDL 29.7 11/07/2022       Chemistry        Component Value Date/Time     10/09/2023 1317    K 4.4 10/09/2023 1317     10/09/2023 1317    CO2 23 10/09/2023 1317    BUN 19 10/09/2023 1317    CREATININE 0.6 10/09/2023 1317    GLU 97 10/09/2023 1317        Component Value Date/Time    CALCIUM 9.8 10/09/2023 1317    ALKPHOS 70 10/09/2023 1317    AST 53 (H) 10/09/2023 1317    ALT 47 (H) 10/09/2023 1317    BILITOT 0.2 10/09/2023 1317          Lab Results   Component Value Date    HGBA1C 5.9 (H) 03/08/2023     Lab Results   Component Value Date    TSH 1.849 11/17/2022     No results found for: "INR", "PROTIME"  Lab Results   Component Value Date    WBC 5.78 10/09/2023    HGB 13.6 10/09/2023    HCT 42.1 10/09/2023    MCV 98 10/09/2023     10/09/2023     BMP  Sodium   Date Value Ref Range Status   10/09/2023 139 136 - 145 mmol/L Final     Potassium   Date Value Ref Range Status   10/09/2023 4.4 3.5 - 5.1 mmol/L Final     Chloride   Date Value Ref Range Status   10/09/2023 103 95 - 110 mmol/L Final     CO2   Date Value Ref Range Status   10/09/2023 23 23 - 29 mmol/L Final     BUN   Date Value Ref Range Status   10/09/2023 19 8 - 23 mg/dL Final     Creatinine   Date Value Ref Range Status   10/09/2023 0.6 0.5 - 1.4 mg/dL Final     Calcium   Date Value Ref Range Status "   10/09/2023 9.8 8.7 - 10.5 mg/dL Final     Anion Gap   Date Value Ref Range Status   10/09/2023 13 8 - 16 mmol/L Final     BNP  @LABRCNTIP(BNP,BNPTRIAGEBLO)@  @LABRCNTIP(troponini)@  CrCl cannot be calculated (Patient's most recent lab result is older than the maximum 7 days allowed.).  No results found in the last 24 hours.  No results found in the last 24 hours.  No results found in the last 24 hours.    Assessment:      1. Sinus tachycardia    2. Other hyperlipidemia    3. Class 1 obesity due to excess calories with body mass index (BMI) of 33.0 to 33.9 in adult, unspecified whether serious comorbidity present        Plan:   Continue metoprolol for Tachy  Continue statin for HLD    Counseled DASH  Check Lipid profile with PCP in 6 months  Recommend heart-healthy diet, weight control and regular exercise.  Naseem. Risk modification.   I have reviewed all pertinent labs and cardiac studies independently. Plans and recommendations have been formulated under my direct supervision. All questions answered and patient voiced understanding.   If symptoms persist go to the ED  RTC as needed

## 2024-01-03 ENCOUNTER — CLINICAL SUPPORT (OUTPATIENT)
Dept: PRIMARY CARE CLINIC | Facility: CLINIC | Age: 74
End: 2024-01-03
Payer: MEDICARE

## 2024-01-03 DIAGNOSIS — F33.1 MODERATE EPISODE OF RECURRENT MAJOR DEPRESSIVE DISORDER: Primary | ICD-10-CM

## 2024-01-03 PROCEDURE — 99499 UNLISTED E&M SERVICE: CPT | Mod: S$PBB,,,

## 2024-01-03 NOTE — PROGRESS NOTES
"Individual Psychotherapy ([facility])  Alison Lobo  1/3/2024  DATE:  1/3/2024  TYPE OF VISIT:  In person  LENGTH OF SESSION: 45    Therapeutic Intervention: Met with patient for individual psychotherapy.    Chief complaint/reason for encounter: depression, anxiety, sleep, and appetite       Session Content/Presenting Problem:    Patient reports having had some tough moments during the holiday.  She did not hear from the grandkids; she is hoping to schedule some vacation time with them.  Normalized the difficulty in communicating with teenagers and kids.  She knows she does not want to give up on having a relationship with the grandkids. She also knows that she wants to have peace in her life. She is focusing on letting things go, not allowing painful feelings to overwhelm her.   Patient is considering expanding her spiritual life. A friend  recently after a short mcguire with cancer. Patient coordinated a group of people from the [employer] to see her in the hospital before she .  She feels good about having been the coordinator that got people together. Says this has always been one of her strengths.  Patient discusses her inability to find a Sabianism that "fits" her.    Discussed what may help her to feel better in general.  Patient discusses her need for exercise, and also her dislike of the word Exercise. Her fear is of falling; she has balance issues.  Discussed not using the word Exercise and not looking at the goal as "getting in shape." Discussed the benefits of movement on improving mood. Patient is aware of the connection between movement and mood. Says she feels more positive about increasing her movement and her time in nature as a way to improve her overall mood. She says "I want to feel better about what I have and use what I have for my own good." Discussed using her porch and sun room more than she has been.     Prior Session:  Discussed the letter patient received from her daughter Rachel.  Says: " ""I thought it was very cruel, I was angry, but then hurt. Very angry."  Was at a bad time - received the letter on the day of her surgery for breast cancer.  Rachel did not know about the second cancer diagnosis b/c they were not talking. They had not talked for a couple years.  Had sent Rachel a letter about 4 months before.   Patient was angry because she saw the letter as disrespectful; says Rachel often spoke to her very disrespectfully. Pt's good friend told Rachel that she was disrespectful and unappreciative.   Patient says "I remember her saying things and I did not know how to react to her."  Patient admits that there were times she was embarrassed to have a daughter with tattoos, head shaved. She felt judged because of her high profile position.  Patient is questioning herself:  Was my desire and ambition destructive in terms of this rltship.  Did I have something to prove. Rachel may have taken things more to heart than I meant. Patient discusses her ego and how acknowledgement of her accomplishments has always been very important to her.   She wants to feel relevant.  She submitted a resume to [employer] for an  position.   Patient will review certain statements in Rachel's letter and try to consider Rachel's point of view.     Current symptoms:  Depression: anhedonia, worthlessness/guilt, hopelessness, and decreased appetite.  Anxiety: excessive worrying and restlessness.  Insomnia: non-restful sleep.  Heidi:  denies.  Psychosis: denies .      Risk parameters:  Patient reports no suicidal ideation  Patient reports no homicidal ideation  Patient reports no self-injurious behavior  Patient reports no violent behavior    MENTAL HEALTH STATUS EXAM  General Appearance:  unremarkable, age appropriate   Speech: normal tone, normal rate, normal pitch, normal volume      Level of Cooperation: cooperative      Thought Processes: normal and logical   Mood: steady      Thought Content: " "normal, no suicidality, no homicidality, delusions, or paranoia   Affect: congruent and appropriate   Orientation: Oriented x3   Attention Span & Concentration: intact   Fund of General Knowledge: intact and appropriate to age and level of education   Judgment & Insight: good     Language  intact       STRENGTHS AND LIABILITIES: Strength: Patient accepts guidance/feedback, Strength: Patient is expressive/articulate., Strength: Patient has reasonable judgment., Strength: Patient is stable.    IMPRESSION:   My diagnostic impression is Anxiety disorders; generalized anxiety disorder [F41.1] and Major Depressive Disorder, Recurrent, Mild (F33.0), as evidenced by feeling down, depressed, feeling irritable, difficulty sleeping, poor appetite, worrying too much.     TREATMENT GOALS (per patient):    "To feel more relevant. To experience calmness and peacefulness." To explore the estrangement from her only child, Rachel.   Exercise, sleep better, increase enjoyable activities.     Treatment plan:  Target symptoms: recurrent depression, anxiety , adjustment  Why chosen therapy is appropriate versus another modality: relevant to diagnosis, patient responds to this modality, evidence based practice  Outcome monitoring methods: self-report, observation, checklist/rating scale  Therapeutic intervention type: insight oriented psychotherapy    Patient's response to intervention:  The patient's response to intervention is guarded.    Progress toward goals and other mental status changes:  The patient's progress toward goals is good.    Plan: Pt plans to continue individual psychotherapy CBT will be utilized in future individual therapy sessions to increase interaction, insight, and support.     Return to clinic: 2 weeks January 18th              "

## 2024-01-04 ENCOUNTER — CLINICAL SUPPORT (OUTPATIENT)
Dept: REHABILITATION | Facility: HOSPITAL | Age: 74
End: 2024-01-04
Payer: MEDICARE

## 2024-01-04 DIAGNOSIS — R26.89 BALANCE PROBLEM: Primary | Chronic | ICD-10-CM

## 2024-01-04 DIAGNOSIS — R53.1 DECREASED STRENGTH, ENDURANCE, AND MOBILITY: ICD-10-CM

## 2024-01-04 DIAGNOSIS — R68.89 DECREASED STRENGTH, ENDURANCE, AND MOBILITY: ICD-10-CM
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
## 2024-01-04 DIAGNOSIS — Z74.09 DECREASED STRENGTH, ENDURANCE, AND MOBILITY: ICD-10-CM

## 2024-01-04 PROCEDURE — 97112 NEUROMUSCULAR REEDUCATION: CPT

## 2024-01-04 PROCEDURE — 97530 THERAPEUTIC ACTIVITIES: CPT

## 2024-01-04 PROCEDURE — 97110 THERAPEUTIC EXERCISES: CPT

## 2024-01-04 NOTE — PROGRESS NOTES
[facility]   Physical Therapy Treatment Note        Name: Alison Lobo  Clinic Number: [id number]    Therapy Diagnosis:   Encounter Diagnoses   Name Primary?    Balance problem Yes    Fall against object     Decreased strength, endurance, and mobility      Physician: Roselia Gillette MD    Visit Date: 2024    Physician Orders: PT Eval and Treat   Medical Diagnosis from Referral:  Balance problem   Fall (on) (from) unspecified stairs and steps, subsequent encounter   Fall against object   Evaluation Date: 2023  Authorization Period Expiration: 10/23/2024  Plan of Care Expiration: 2024  Progress Note Due: 2024  Visit # / Visits authorized:  (+1)   FOTO: 1/3      Precautions: Standard and Fall    Time In: 1000 am  Time Out: 1100  Total Billable Time: 60 minutes    SUBJECTIVE     Pt reports: no pain today, was sore after last visit. She reports that she changed to the Mediterranean diet and has noticed a big change in pain levels.     She was compliant with home exercise program.    Response to previous treatment: dizziness with open book exercise    Functional change: no change    Pain: 0/10  Location:  no pain today    OBJECTIVE     Objective Measures updated at progress report unless specified.     Single leg stance: R = 4 seconds, L = 5 seconds    Treadmill walkin.9 mph x 12 minutes    TREATMENT     Alison received the treatments listed below:      therapeutic exercises to develop strength, endurance, ROM, flexibility, and core stabilization for 30 minutes including:      Intervention 2024   Parameters    Treadmill walking   x  2.5 mph x 12 min (warm up for 1 min)   Bike   5 minutes    Standing strengthening exercises (add theraband as able) x  x  x    x Hip Flexion - red theraband - 2x15  Hip Extension red theraband - 2x15   Hip Abduction red theraband - 2x15   Hip Adduction  Single leg heel raises   Shuttle x  5 Bands, 10x/ 3 sets   Squat   -  Picking up 6# medicine ball from the floor - 2x10                                      neuromuscular re-education activities to improve: Balance, Coordination, Proprioception, and Posture for 15 minutes. The following activities were included:    Intervention 1/4/2024  Parameters   Standing hand touches - at parallel bars   x    X    X    x   Tandem - 3 x 15 seconds   Single leg stance  - 3 x 30 - with vertical eye movement  Single leg stance - 3x30 with horizontal eye movement  Tandem stance with vertical eye movements 10x eyes, 1 x each leg  Tandem stance with horizontal head movements 5x each (dizzy after)   Open book   7x each side DC dizzy after        bridge  10x/ 2 sets        Sidelying hips series  Abduction x 10   Circles x 10 clockwise/counter clockwise   X 10 forward/backward bicycles (added)    High hurdles - Balance walking over hurdles - 4 passes                         Alison participated in dynamic functional therapeutic activities to improve functional performance for 15  minutes, including:    Intervention 1/4/2024  Parameters   Sit to stand from lower surfaces x 2x15 - 16 inches        Agility/balance   X  X  x Sidesteps 3 laps in parallel bars  Carioca 2 laps in parallel bars  Large forward steps  Backward walking   Step downs - 6 inch box - trying to use one hand - 2x10   Step ups  x 10x/ 6in step each leg - single hand support   Matrix x Knee Extension 15lbs plus one small weight - 2x15                                       Plan for Next Visit: progress as able        PATIENT EDUCATION AND HOME EXERCISES     Home Exercises Provided and Patient Education Provided     Education provided:   - HOME EXERCISE PROGRAM  11/30/2023: added home exercise program to my chart and gave instructed how to access on my chart .    Written Home Exercises Provided: Patient instructed to cont prior HEP. Exercises were reviewed and Alison was able to demonstrate them prior to the end of the session.  Alison  demonstrated good  understanding of the education provided. See EMR under Patient Instructions for exercises provided during therapy sessions      ASSESSMENT     Patient reports that she continues to experience a bit of weakness and fatigue with exercises.  Difficulty dissociating trunk from head with VOR tasks.  Therefore, worked on multiple techniques to improve segmental control while in tandem stance.  There is still significant difficulty with the balance activities.  There is improved single leg stance time.  There is also increased endurance with treadmill walking time.    Alison Is progressing well towards her goals.   Pt prognosis is Good.     Pt will continue to benefit from skilled outpatient physical therapy to address the deficits listed in the problem list box on initial evaluation, provide pt/family education and to maximize pt's level of independence in the home and community environment.     Pt's spiritual, cultural and educational needs considered and pt agreeable to plan of care and goals.     Anticipated barriers to physical therapy:  Anxiety or Panic Disorders, Arthritis, BMI over 30, Cancer, Depression, Incontinence, Previous accidents, Sleep dysfunction    Goals:   Reviewed: 1/4/2024    Short Term Goals: In 4 weeks   1.Patient to be educated on HEP. - met  2.Patient single leg stance balance times to 5 seconds or greater  PC  3.Patient to increase bilateral lower extremities strength to 4/5 for improved sit to stand from lower surfaces PC  4.Patient to have pain less than 2/10 at worst, to improve QOL. met  5.Patient to improve score on the FOTO, to improve QOL. PC  6. Patient to demonstrate improved squat to 75% PC - progressing     Long Term Goals: In 8 weeks  1.Patient to improve score on the FOTO to 57 or greater, to improve QOL. PC  2. Patient to increase bilateral lower extremities strength to 4/5 for improved sit to stand from lower surfaces PC  3. Patient to have decreased pain to  2/10 at worst, to improve QOL. met  4. Patient to demonstrate improved 5 second sit to stand score to 11 seconds PC  5. Patient single leg stance balance times to 5 seconds or greater  PC  6. Patient to perform on and off the ground without assistance. met  5. Patient to perform daily activities without increased symptoms including.  Walking on grass or uneven surfaces met  Prolonged walking without complaint of fatigue PC  Squat to ground without apprehension PC       PLAN     Monitor response to today's treatment session and progress with Physical Therapy plan of care as indicated.    Plan of care Certification: 11/22/2023 to 2/22/2024.     Outpatient Physical Therapy 2 times weekly for 10 weeks to include the following interventions: Gait Training, Manual Therapy, Neuromuscular Re-ed, Patient Education, Self Care, Therapeutic Activities, and Therapeutic Exercise.     Connor Puckett, PT

## 2024-01-05 NOTE — PLAN OF CARE
[facility]   Physical Therapy Treatment Note        Name: Alison Lobo  Clinic Number: [id number]    Therapy Diagnosis:   Encounter Diagnoses   Name Primary?    Balance problem Yes    Fall against object     Decreased strength, endurance, and mobility      Physician: Roselia Gillette MD    Visit Date: 2024    Physician Orders: PT Eval and Treat   Medical Diagnosis from Referral:  Balance problem   Fall (on) (from) unspecified stairs and steps, subsequent encounter   Fall against object   Evaluation Date: 2023  Authorization Period Expiration: 10/23/2024  Plan of Care Expiration: 2024  Progress Note Due: 2024  Visit # / Visits authorized:  (+1)   FOTO: 1/3      Precautions: Standard and Fall    Time In: 1000 am  Time Out: 1100  Total Billable Time: 60 minutes    SUBJECTIVE     Pt reports: no pain today, was sore after last visit. She reports that she changed to the Mediterranean diet and has noticed a big change in pain levels.     She was compliant with home exercise program.    Response to previous treatment: dizziness with open book exercise    Functional change: no change    Pain: 0/10  Location:  no pain today    OBJECTIVE     Objective Measures updated at progress report unless specified.     Single leg stance: R = 4 seconds, L = 5 seconds    Treadmill walkin.9 mph x 12 minutes    TREATMENT     Alison received the treatments listed below:      therapeutic exercises to develop strength, endurance, ROM, flexibility, and core stabilization for 30 minutes including:      Intervention 2024   Parameters    Treadmill walking   x  2.5 mph x 12 min (warm up for 1 min)   Bike   5 minutes    Standing strengthening exercises (add theraband as able) x  x  x    x Hip Flexion - red theraband - 2x15  Hip Extension red theraband - 2x15   Hip Abduction red theraband - 2x15   Hip Adduction  Single leg heel raises   Shuttle x  5 Bands, 10x/ 3 sets   Squat   -  Picking up 6# medicine ball from the floor - 2x10                                      neuromuscular re-education activities to improve: Balance, Coordination, Proprioception, and Posture for 15 minutes. The following activities were included:    Intervention 1/4/2024  Parameters   Standing hand touches - at parallel bars   x    X    X    x   Tandem - 3 x 15 seconds   Single leg stance  - 3 x 30 - with vertical eye movement  Single leg stance - 3x30 with horizontal eye movement  Tandem stance with vertical eye movements 10x eyes, 1 x each leg  Tandem stance with horizontal head movements 5x each (dizzy after)   Open book   7x each side DC dizzy after        bridge  10x/ 2 sets        Sidelying hips series  Abduction x 10   Circles x 10 clockwise/counter clockwise   X 10 forward/backward bicycles (added)    High hurdles - Balance walking over hurdles - 4 passes                         Alison participated in dynamic functional therapeutic activities to improve functional performance for 15  minutes, including:    Intervention 1/4/2024  Parameters   Sit to stand from lower surfaces x 2x15 - 16 inches        Agility/balance   X  X  x Sidesteps 3 laps in parallel bars  Carioca 2 laps in parallel bars  Large forward steps  Backward walking   Step downs - 6 inch box - trying to use one hand - 2x10   Step ups  x 10x/ 6in step each leg - single hand support   Matrix x Knee Extension 15lbs plus one small weight - 2x15                                       Plan for Next Visit: progress as able        PATIENT EDUCATION AND HOME EXERCISES     Home Exercises Provided and Patient Education Provided     Education provided:   - HOME EXERCISE PROGRAM  11/30/2023: added home exercise program to my chart and gave instructed how to access on my chart .    Written Home Exercises Provided: Patient instructed to cont prior HEP. Exercises were reviewed and Alison was able to demonstrate them prior to the end of the session.  Alison  demonstrated good  understanding of the education provided. See EMR under Patient Instructions for exercises provided during therapy sessions      ASSESSMENT     Patient reports that she continues to experience a bit of weakness and fatigue with exercises.  Difficulty dissociating trunk from head with VOR tasks.  Therefore, worked on multiple techniques to improve segmental control while in tandem stance.  There is still significant difficulty with the balance activities.  There is improved single leg stance time.  There is also increased endurance with treadmill walking time.    Alison Is progressing well towards her goals.   Pt prognosis is Good.     Pt will continue to benefit from skilled outpatient physical therapy to address the deficits listed in the problem list box on initial evaluation, provide pt/family education and to maximize pt's level of independence in the home and community environment.     Pt's spiritual, cultural and educational needs considered and pt agreeable to plan of care and goals.     Anticipated barriers to physical therapy:  Anxiety or Panic Disorders, Arthritis, BMI over 30, Cancer, Depression, Incontinence, Previous accidents, Sleep dysfunction    Goals:   Reviewed: 1/4/2024    Short Term Goals: In 4 weeks   1.Patient to be educated on HEP. - met  2.Patient single leg stance balance times to 5 seconds or greater  PC  3.Patient to increase bilateral lower extremities strength to 4/5 for improved sit to stand from lower surfaces PC  4.Patient to have pain less than 2/10 at worst, to improve QOL. met  5.Patient to improve score on the FOTO, to improve QOL. PC  6. Patient to demonstrate improved squat to 75% PC - progressing     Long Term Goals: In 8 weeks  1.Patient to improve score on the FOTO to 57 or greater, to improve QOL. PC  2. Patient to increase bilateral lower extremities strength to 4/5 for improved sit to stand from lower surfaces PC  3. Patient to have decreased pain to  2/10 at worst, to improve QOL. met  4. Patient to demonstrate improved 5 second sit to stand score to 11 seconds PC  5. Patient single leg stance balance times to 5 seconds or greater  PC  6. Patient to perform on and off the ground without assistance. met  5. Patient to perform daily activities without increased symptoms including.  Walking on grass or uneven surfaces met  Prolonged walking without complaint of fatigue PC  Squat to ground without apprehension PC       PLAN     Monitor response to today's treatment session and progress with Physical Therapy plan of care as indicated.    Plan of care Certification: 11/22/2023 to 2/22/2024.     Outpatient Physical Therapy 2 times weekly for 10 weeks to include the following interventions: Gait Training, Manual Therapy, Neuromuscular Re-ed, Patient Education, Self Care, Therapeutic Activities, and Therapeutic Exercise.     Connor Puckett, PT

## 2024-01-08 RX ORDER — ATORVASTATIN CALCIUM 20 MG/1
20 TABLET, FILM COATED ORAL
Qty: 90 TABLET | Refills: 1 | Status: SHIPPED | OUTPATIENT
Start: 2024-01-08

## 2024-01-09 ENCOUNTER — OFFICE VISIT (OUTPATIENT)
Dept: PRIMARY CARE CLINIC | Facility: CLINIC | Age: 74
End: 2024-01-09
Payer: MEDICARE

## 2024-01-09 VITALS
HEIGHT: 63 IN | SYSTOLIC BLOOD PRESSURE: 132 MMHG | WEIGHT: 181.56 LBS | BODY MASS INDEX: 32.17 KG/M2 | HEART RATE: 90 BPM | DIASTOLIC BLOOD PRESSURE: 62 MMHG | TEMPERATURE: 98 F | OXYGEN SATURATION: 97 %

## 2024-01-09 DIAGNOSIS — S40.862A INSECT BITE OF LEFT UPPER ARM, INITIAL ENCOUNTER: ICD-10-CM
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
## 2024-01-09 DIAGNOSIS — L25.9 CONTACT DERMATITIS, UNSPECIFIED CONTACT DERMATITIS TYPE, UNSPECIFIED TRIGGER: Primary | ICD-10-CM

## 2024-01-09 PROCEDURE — 96372 THER/PROPH/DIAG INJ SC/IM: CPT | Mod: PBBFAC,PN

## 2024-01-09 PROCEDURE — 99999 PR PBB SHADOW E&M-EST. PATIENT-LVL III: CPT | Mod: PBBFAC,,, | Performed by: NURSE PRACTITIONER

## 2024-01-09 PROCEDURE — 99213 OFFICE O/P EST LOW 20 MIN: CPT | Mod: S$PBB,,, | Performed by: NURSE PRACTITIONER

## 2024-01-09 PROCEDURE — 99999PBSHW PR PBB SHADOW TECHNICAL ONLY FILED TO HB: Mod: PBBFAC,,,

## 2024-01-09 PROCEDURE — 99213 OFFICE O/P EST LOW 20 MIN: CPT | Mod: PBBFAC,PN | Performed by: NURSE PRACTITIONER

## 2024-01-09 RX ORDER — HYDROXYZINE PAMOATE 25 MG/1
25 CAPSULE ORAL EVERY 8 HOURS PRN
Qty: 30 CAPSULE | Refills: 0 | Status: SHIPPED | OUTPATIENT
Start: 2024-01-09 | End: 2024-01-19

## 2024-01-09 RX ORDER — TRIAMCINOLONE ACETONIDE 40 MG/ML
60 INJECTION, SUSPENSION INTRA-ARTICULAR; INTRAMUSCULAR
Status: COMPLETED | OUTPATIENT
Start: 2024-01-09 | End: 2024-01-09

## 2024-01-09 RX ORDER — TRIAMCINOLONE ACETONIDE 1 MG/G
CREAM TOPICAL 2 TIMES DAILY
Qty: 45 G | Refills: 1 | Status: SHIPPED | OUTPATIENT
Start: 2024-01-09 | End: 2024-01-19

## 2024-01-09 RX ADMIN — TRIAMCINOLONE ACETONIDE 60 MG: 40 INJECTION, SUSPENSION INTRA-ARTICULAR; INTRAMUSCULAR at 01:01

## 2024-01-09 NOTE — ASSESSMENT & PLAN NOTE
Take antihistamines to control itching: use Zyrtec or Claritin during the day  May take/give Benadryl for nighttime  Keep skin well hydrated - use a moisturizing cream such as Eucerin cream or Cetaphil cream  Bath or shower in tepid water, excessively hot water will increase the itching  Use a mild soap such as Dove when washing the underarms and groin area; use plain water on other parts of the body  Wear loose fitting cotton clothing  Use fragrance free laundry detergents  May try a topical lotion for itching such as Sarna lotion

## 2024-01-09 NOTE — PATIENT INSTRUCTIONS
Give antihistamines to control itching: use Zyrtec or Claritin during the day  May take/give Benadryl for nighttime  Vistaril - stronger than Benadryl  Keep skin well hydrated - use a moisturizing cream such as Eucerin cream or Cetaphil cream  Bath or shower in tepid water, excessively hot water will increase the itching  Use a mild soap such as Dove when washing the underarms and groin area; use plain water on other parts of the body  Wear loose fitting cotton clothing  Use fragrance free laundry detergents  May try a topical lotion for itching such as Sarna lotion

## 2024-01-09 NOTE — PROGRESS NOTES
Alison Lobo  2024  [id number]    Roselia Gillette MD  Patient Care Team:  Roselia Gillette MD as PCP - General (Internal Medicine)  Clarissa Stephenson NP as Nurse Practitioner (Family Medicine)  Ishan Whatley MD as Consulting Physician (Breast Surgery)      [facility]      Chief Complaint:  Chief Complaint   Patient presents with    Insect Bite     X 3 weeks on back and shoulders       History of Present Illness:      Ms. Lobo presents today with c/o pruritic rash onset x 2 weeks. She describes having sensitive skin. Her home was infested with flees after her cat went outside. She has multiple healing flea bites to the upper back and bilateral arms. Denies any signs of infection. She has applied OTC cortisone and benadryl with mild improvement. Pruritus is persistent.     Denies new medications, clothing detergent, soaps, perfumes.   Denies facial swelling, SOB, wheezing      The following were reviewed: Active problem list, medication list, allergies, family history, social history, and Health Maintenance.     History:  Past Medical History:   Diagnosis Date    Cancer     Hyperlipidemia     Malignant neoplasm of upper-outer quadrant of right breast in female, estrogen receptor positive 2023     Past Surgical History:   Procedure Laterality Date    BREAST SURGERY       SECTION      HYSTERECTOMY       Family History   Problem Relation Age of Onset    Hypertension Mother     Diabetes Mother     Heart failure Mother     Angina Father      Patient Active Problem List   Diagnosis    Hyperlipidemia    Malignant neoplasm of left breast in female, estrogen receptor positive    History of right breast cancer    Class 1 obesity due to excess calories with body mass index (BMI) of 33.0 to 33.9 in adult, unspecified whether serious comorbidity present    Acquired hypothyroidism    JADA on CPAP    Perineal irritation in female    Moderate episode of recurrent major depressive  disorder    MEHNAZ (generalized anxiety disorder)    Other insomnia    Prediabetes    Chun's neuroma of both feet    Primary osteoarthritis involving multiple joints    Myalgia    Seasonal allergic rhinitis    Stress incontinence of urine    Malignant neoplasm of upper-outer quadrant of right breast in female, estrogen receptor positive    Balance problem    Fall (on) (from) unspecified stairs and steps, subsequent encounter    Fall against object    Orthostasis    Decreased strength, endurance, and mobility    Sinus tachycardia    Contact dermatitis    Insect bite of left upper arm     Review of patient's allergies indicates:  No Known Allergies    Medications:  Current Outpatient Medications on File Prior to Visit   Medication Sig Dispense Refill    atorvastatin (LIPITOR) 20 MG tablet TAKE 1 TABLET BY MOUTH DAILY 90 tablet 1    azelastine (ASTELIN) 137 mcg (0.1 %) nasal spray 1 spray (137 mcg total) by Nasal route 2 (two) times daily as needed for Rhinitis. 30 mL 5    blood pressure monitor (BLOOD PRESSURE KIT) Kit 1 kit by Misc.(Non-Drug; Combo Route) route Daily. 1 each 0    buPROPion (WELLBUTRIN XL) 150 MG TB24 tablet Take 150 mg by mouth every morning.      co-enzyme Q-10 50 mg capsule Take 50 mg by mouth once daily.      levothyroxine (SYNTHROID) 25 MCG tablet Take 1 tablet (25 mcg total) by mouth before breakfast. 30 tablet 5    meloxicam (MOBIC) 7.5 MG tablet Take 2 tablets (15 mg total) by mouth once daily for 7 days, THEN 1 tablet (7.5 mg total) once daily. 374 tablet 0    metFORMIN (GLUCOPHAGE) 500 MG tablet Take 2.5 tablets (1,250 mg total) by mouth once daily. Takes 2.5 500mg  tablet 1    metoprolol succinate (TOPROL-XL) 50 MG 24 hr tablet TAKE 1 TABLET(50 MG) BY MOUTH EVERY DAY 30 tablet 11    montelukast (SINGULAIR) 10 mg tablet Take 1 tablet (10 mg total) by mouth every evening. 90 tablet 1    tamoxifen (NOLVADEX) 20 MG Tab Take 20 mg by mouth once daily.      venlafaxine (EFFEXOR-XR) 150 MG  Cp24 Take 150 mg by mouth once daily. Takes 2 pills QD      vitamin D (VITAMIN D3) 1000 units Tab once daily.       No current facility-administered medications on file prior to visit.       Medications have been reviewed and reconciled with patient at visit today.      Exam:  Vitals:    01/09/24 1317   BP: 132/62   Pulse: 90   Temp: 98.2 °F (36.8 °C)     Weight: 82.3 kg (181 lb 8.8 oz)   Body mass index is 32.16 kg/m².      BP Readings from Last 3 Encounters:   01/09/24 132/62   12/28/23 122/62   11/28/23 129/81     Wt Readings from Last 3 Encounters:   01/09/24 1317 82.3 kg (181 lb 8.8 oz)   12/28/23 1518 83.8 kg (184 lb 11.9 oz)   11/28/23 0709 84 kg (185 lb 3 oz)        REVIEW OF SYSTEMS  Review of Systems   Constitutional:  Negative for chills and fever.   HENT:  Negative for congestion and sore throat.    Respiratory:  Negative for cough and shortness of breath.    Cardiovascular:  Negative for chest pain.   Gastrointestinal:  Negative for abdominal pain, nausea and vomiting.   Genitourinary:  Negative for dysuria and frequency.   Musculoskeletal:  Negative for back pain and myalgias.   Skin:  Positive for itching and rash.   Neurological:  Negative for dizziness and headaches.   Psychiatric/Behavioral:  The patient is not nervous/anxious.        Physical Exam  Vitals reviewed.   Constitutional:       General: She is not in acute distress.     Appearance: Normal appearance. She is not ill-appearing.   HENT:      Head: Normocephalic and atraumatic.      Nose: Nose normal.      Mouth/Throat:      Mouth: Mucous membranes are moist.   Eyes:      General: No scleral icterus.     Conjunctiva/sclera: Conjunctivae normal.   Cardiovascular:      Rate and Rhythm: Normal rate and regular rhythm.      Heart sounds: No murmur heard.  Pulmonary:      Effort: Pulmonary effort is normal. No respiratory distress.      Breath sounds: Normal breath sounds.   Abdominal:      Palpations: Abdomen is soft. There is no mass.       "Tenderness: There is no abdominal tenderness.   Musculoskeletal:         General: No swelling or deformity. Normal range of motion.      Cervical back: Normal range of motion and neck supple.      Right lower leg: No edema.      Left lower leg: No edema.   Lymphadenopathy:      Cervical: No cervical adenopathy.   Skin:     General: Skin is warm and dry.      Findings: Rash present.      Comments: Upper back - nonconfluent erythematous macules    Small scabbing to central wound     Neurological:      Mental Status: She is alert and oriented to person, place, and time. Mental status is at baseline.   Psychiatric:         Mood and Affect: Mood normal.         Thought Content: Thought content normal.         Laboratory Reviewed:     Lab Results   Component Value Date    WBC 5.78 10/09/2023    HGB 13.6 10/09/2023    HCT 42.1 10/09/2023     10/09/2023    CHOL 138 11/07/2022    TRIG 150 11/07/2022    HDL 41 11/07/2022    ALT 47 (H) 10/09/2023    AST 53 (H) 10/09/2023     10/09/2023    K 4.4 10/09/2023     10/09/2023    CREATININE 0.6 10/09/2023    BUN 19 10/09/2023    CO2 23 10/09/2023    TSH 1.849 11/17/2022    HGBA1C 5.9 (H) 03/08/2023       Screening or Prevention Patient's value Goal Complete/Controlled?   HgA1C Testing and Control   Lab Results   Component Value Date    HGBA1C 5.9 (H) 03/08/2023      Annually/Less than 8% Yes   Lipid profile : 11/07/2022 Annually No   LDL control Lab Results   Component Value Date    LDLCALC 67.0 11/07/2022    Annually/Less than 100 mg/dl  No   Nephropathy screening No results found for: "LABMICR"  Lab Results   Component Value Date    PROTEINUA Negative 03/08/2023    Annually Yes   Blood pressure BP Readings from Last 1 Encounters:   01/09/24 132/62    Less than 140/90 Yes   Dilated retinal exam Most Recent Eye Exam Date: Not Found Annually Yes   Foot exam   Most Recent Foot Exam Date: Not Found Annually Yes       Health Maintenance  Health Maintenance Topics with " due status: Not Due       Topic Last Completion Date    TETANUS VACCINE 05/09/2019    DEXA Scan 05/12/2022    Colorectal Cancer Screening 09/13/2022    Lipid Panel 11/07/2022    Hemoglobin A1c (Prediabetes) 03/08/2023     Health Maintenance Due   Topic Date Due    RSV Vaccine (Age 60+ and Pregnant patients) (1 - 1-dose 60+ series) Never done       Assessment and Plan:  1. Contact dermatitis, unspecified contact dermatitis type, unspecified trigger  -     hydrOXYzine pamoate (VISTARIL) 25 MG Cap; Take 1 capsule (25 mg total) by mouth every 8 (eight) hours as needed (itching).  Dispense: 30 capsule; Refill: 0  -     triamcinolone acetonide injection 60 mg  -     triamcinolone acetonide 0.1% (KENALOG) 0.1 % cream; Apply topically 2 (two) times daily.  Dispense: 45 g; Refill: 1    2. Insect bite of left upper arm, initial encounter  Assessment & Plan:  Take antihistamines to control itching: use Zyrtec or Claritin during the day  May take/give Benadryl for nighttime  Keep skin well hydrated - use a moisturizing cream such as Eucerin cream or Cetaphil cream  Bath or shower in tepid water, excessively hot water will increase the itching  Use a mild soap such as Dove when washing the underarms and groin area; use plain water on other parts of the body  Wear loose fitting cotton clothing  Use fragrance free laundry detergents  May try a topical lotion for itching such as Sarna lotion                    -Patient's lab results were reviewed and discussed with patient  -Treatment options and alternatives were discussed with the patient. Patient expressed understanding. Patient was given the opportunity to ask questions and be an active participant in their medical care. Patient had no further questions or concerns at this time.         Future Appointments   Date Time Provider Department Center   1/11/2024 10:00 AM Connor Puckett PT [facility]   1/16/2024 10:30 AM [facility]   1/17/2024 10:00 AM Orquideachasdanilo Summer, PT [facility] [place]   1/18/2024  9:00 AM Madison Girffith LCSW [facility]   1/19/2024  3:00 PM Roselia Gillette MD [facility]   1/22/2024 10:00 AM Summer Garcai, PT [facility] [place]   1/25/2024 10:00 AM Connor Puckett, PT [facility] [place]   1/29/2024  9:30 AM Ishan Mayen, PTA [facility] [place]   2/1/2024 10:00 AM Italo Edbaljinder RIVER, PT [facility] [place]   2/5/2024 12:15 PM Connor Puckett, PT [facility] [place]   2/8/2024 10:00 AM Italo Edbaljinder RIVER, PT [facility] [place]   4/22/2024  1:30 PM Ishan Whatley MD [facility] BREAST Breast Surg   6/4/2024  8:00 AM [facility] US1 [facility] [place]   6/4/2024  9:00 AM Anastasiia Bullard, TONIP [facility] HEPAT [place]   6/4/2024  9:30 AM LABORATORY, [facility] [place]   12/31/2024 11:20 AM Osmani Reyes MD [facility] CARDIO [place]          After visit summary printed and given to patient upon discharge.  Patient goals and care plan are included in After visit summary.      The following issues were discussed: The primary encounter diagnosis was Contact dermatitis, unspecified contact dermatitis type, unspecified trigger. A diagnosis of Insect bite of left upper arm, initial encounter was also pertinent to this visit.    Health maintenance needs, recent test results and goals of care discussed with pt and questions answered.           Danitza Martinez, JESSICA, NP-C  [facility] [phone], [place] [address]

## 2024-01-18 ENCOUNTER — CLINICAL SUPPORT (OUTPATIENT)
Dept: PRIMARY CARE CLINIC | Facility: CLINIC | Age: 74
End: 2024-01-18
Payer: MEDICARE

## 2024-01-18 ENCOUNTER — LAB VISIT (OUTPATIENT)
Dept: LAB | Facility: HOSPITAL | Age: 74
End: 2024-01-18
Attending: INTERNAL MEDICINE
Payer: MEDICARE

## 2024-01-18 DIAGNOSIS — E78.5 HYPERLIPIDEMIA, UNSPECIFIED HYPERLIPIDEMIA TYPE: Chronic | ICD-10-CM

## 2024-01-18 DIAGNOSIS — E83.42 HYPOMAGNESEMIA: ICD-10-CM

## 2024-01-18 DIAGNOSIS — R74.01 TRANSAMINITIS: ICD-10-CM

## 2024-01-18 DIAGNOSIS — E03.9 ACQUIRED HYPOTHYROIDISM: Chronic | ICD-10-CM

## 2024-01-18 DIAGNOSIS — F33.1 MODERATE EPISODE OF RECURRENT MAJOR DEPRESSIVE DISORDER: Primary | ICD-10-CM

## 2024-01-18 DIAGNOSIS — R73.03 PREDIABETES: ICD-10-CM

## 2024-01-18 LAB
ALBUMIN SERPL BCP-MCNC: 3.9 G/DL (ref 3.5–5.2)
ALP SERPL-CCNC: 66 U/L (ref 55–135)
ALT SERPL W/O P-5'-P-CCNC: 31 U/L (ref 10–44)
ANION GAP SERPL CALC-SCNC: 8 MMOL/L (ref 8–16)
AST SERPL-CCNC: 30 U/L (ref 10–40)
BILIRUB SERPL-MCNC: 0.4 MG/DL (ref 0.1–1)
BUN SERPL-MCNC: 16 MG/DL (ref 8–23)
CALCIUM SERPL-MCNC: 9.8 MG/DL (ref 8.7–10.5)
CHLORIDE SERPL-SCNC: 103 MMOL/L (ref 95–110)
CHOLEST SERPL-MCNC: 147 MG/DL (ref 120–199)
CHOLEST/HDLC SERPL: 3.4 {RATIO} (ref 2–5)
CO2 SERPL-SCNC: 26 MMOL/L (ref 23–29)
CREAT SERPL-MCNC: 0.8 MG/DL (ref 0.5–1.4)
EST. GFR  (NO RACE VARIABLE): >60 ML/MIN/1.73 M^2
ESTIMATED AVG GLUCOSE: 111 MG/DL (ref 68–131)
GLUCOSE SERPL-MCNC: 89 MG/DL (ref 70–110)
HBA1C MFR BLD: 5.5 % (ref 4–5.6)
HDLC SERPL-MCNC: 43 MG/DL (ref 40–75)
HDLC SERPL: 29.3 % (ref 20–50)
LDLC SERPL CALC-MCNC: 86.2 MG/DL (ref 63–159)
MAGNESIUM SERPL-MCNC: 2.2 MG/DL (ref 1.6–2.6)
NONHDLC SERPL-MCNC: 104 MG/DL
POTASSIUM SERPL-SCNC: 4.8 MMOL/L (ref 3.5–5.1)
PROT SERPL-MCNC: 7.2 G/DL (ref 6–8.4)
SODIUM SERPL-SCNC: 137 MMOL/L (ref 136–145)
T4 FREE SERPL-MCNC: 0.9 NG/DL (ref 0.71–1.51)
TRIGL SERPL-MCNC: 89 MG/DL (ref 30–150)
TSH SERPL DL<=0.005 MIU/L-ACNC: 2.76 UIU/ML (ref 0.4–4)

## 2024-01-18 PROCEDURE — 84439 ASSAY OF FREE THYROXINE: CPT | Performed by: INTERNAL MEDICINE

## 2024-01-18 PROCEDURE — 36415 COLL VENOUS BLD VENIPUNCTURE: CPT | Mod: PO | Performed by: INTERNAL MEDICINE

## 2024-01-18 PROCEDURE — 80053 COMPREHEN METABOLIC PANEL: CPT | Performed by: INTERNAL MEDICINE

## 2024-01-18 PROCEDURE — 84443 ASSAY THYROID STIM HORMONE: CPT | Performed by: INTERNAL MEDICINE

## 2024-01-18 PROCEDURE — 80061 LIPID PANEL: CPT | Performed by: INTERNAL MEDICINE

## 2024-01-18 PROCEDURE — 83735 ASSAY OF MAGNESIUM: CPT | Performed by: INTERNAL MEDICINE

## 2024-01-18 PROCEDURE — 99499 UNLISTED E&M SERVICE: CPT | Mod: S$PBB,,,

## 2024-01-18 PROCEDURE — 83036 HEMOGLOBIN GLYCOSYLATED A1C: CPT | Performed by: INTERNAL MEDICINE

## 2024-01-18 NOTE — PROGRESS NOTES
"Individual Psychotherapy ([facility])  Alison Lobo  1/18/2024  DATE:  1/18/2024  TYPE OF VISIT:  In person  LENGTH OF SESSION: 45    Therapeutic Intervention: Met with patient for individual psychotherapy.    Chief complaint/reason for encounter: depression, anxiety, sleep, and appetite       Session Content/Presenting Problem:    Patient has decided not to go out to [place] to see her grandkids. Discusses her discomfort in trying to balance her moral obligation to the kids and desire to see them with feeling hurt by their lack of communication. Patient did not send Michelle presents to them this year because she feels cut off. She has not heard any interest from the kids in her visiting.  Normalized the grandkids' behavior; patient does believe they are "just being kids." She will reach out to them and their dad soon to discuss making some plans.    Patient discusses being moreaccepting of living here; she does not want to ever move again.  She enjoys her time spent at the [employer] and has made good friends there.  Discussed the concept of embracing what she enjoys rather than focusing on what she does not enjoy. "Take what you need and leave the rest."  She feels the [employer] is a comfort zone.  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   As part of her self care, patient has started a course a friend is hosting. Says she has realized she has been asking herself the wrong question: Am I relevant?  She has decided to focus on asking the question: how can I be relevant to other people.   Beatriz Cespedes's sister, Judy has recently been diagnosed with Alzheimers. Patient has had difficulty dealing with Judy and had stopped talking to her. Has felt guilty and anxious about avoiding Judy for a while.  After hearing of the diagnosis, she called Judy. Patient explores how she can be helpful to Judy while not becoming overly involved.  Discussed her tendency " "to avoid others when she feels anxious. When Judy was calling her too much, she chose to ignore her calls. This seemed to fix the problem, but actually exacerbated her feelings of guilt and anxiety. Discussed changing the narrative to take control of the situation.  Patient will focus on being proactive in offering her time and help in areas she is comfortable.  She has decided she will start with helping with Judy's paperwork.  This will allow her to be helpful in a form that is comfortable for her; she will then not have guilt if she refuses other activities she does not like.   Patient will challenge her negative thought of   "You have no control". She will take control in order to minimize her anxiety.       Prior Session:  Patient reports having had some tough moments during the holiday.  She did not hear from the grandkids; she is hoping to schedule some vacation time with them.  Normalized the difficulty in communicating with teenagers and kids.  She knows she does not want to give up on having a relationship with the [employer]. She also knows that she wants to have peace in her life. She is focusing on letting things go, not allowing painful feelings to overwhelm her.   Patient is considering expanding her spiritual life. A friend  recently after a short mcguire with cancer. Patient coordinated a group of people from the [employer] to see her in the hospital before she .  She feels good about having been the coordinator that got people together. Says this has always been one of her strengths.  Patient discusses her inability to find a Holiness that "fits" her.    Discussed what may help her to feel better in general.  Patient discusses her need for exercise, and also her dislike of the word Exercise. Her fear is of falling; she has balance issues.  Discussed not using the word Exercise and not looking at the goal as "getting in shape." Discussed the benefits of movement on improving mood. Patient is " "aware of the connection between movement and mood. Says she feels more positive about increasing her movement and her time in nature as a way to improve her overall mood. She says "I want to feel better about what I have and use what I have for my own good." Discussed using her porch and sun room more than she has been.     Current symptoms:  Depression: anhedonia, worthlessness/guilt, hopelessness, and decreased appetite.  Anxiety: excessive worrying and restlessness.  Insomnia: non-restful sleep.  Heidi:  denies.  Psychosis: denies .      Risk parameters:  Patient reports no suicidal ideation  Patient reports no homicidal ideation  Patient reports no self-injurious behavior  Patient reports no violent behavior    MENTAL HEALTH STATUS EXAM  General Appearance:  unremarkable, age appropriate   Speech: normal tone, normal rate, normal pitch, normal volume      Level of Cooperation: cooperative      Thought Processes: normal and logical   Mood: steady      Thought Content: normal, no suicidality, no homicidality, delusions, or paranoia   Affect: congruent and appropriate   Orientation: Oriented x3   Attention Span & Concentration: intact   Fund of General Knowledge: intact and appropriate to age and level of education   Judgment & Insight: good     Language  intact       STRENGTHS AND LIABILITIES: Strength: Patient accepts guidance/feedback, Strength: Patient is expressive/articulate., Strength: Patient has reasonable judgment., Strength: Patient is stable.    IMPRESSION:   My diagnostic impression is Anxiety disorders; generalized anxiety disorder [F41.1] and Major Depressive Disorder, Recurrent, Mild (F33.0), as evidenced by feeling down, depressed, feeling irritable, difficulty sleeping, poor appetite, worrying too much.     TREATMENT GOALS (per patient):    "To feel more relevant. To experience calmness and peacefulness." To explore the estrangement from her only child, Rachel.   Exercise, sleep better, increase " enjoyable activities.     Treatment plan:  Target symptoms: recurrent depression, anxiety , adjustment  Why chosen therapy is appropriate versus another modality: relevant to diagnosis, patient responds to this modality, evidence based practice  Outcome monitoring methods: self-report, observation, checklist/rating scale  Therapeutic intervention type: insight oriented psychotherapy    Patient's response to intervention:  The patient's response to intervention is guarded.    Progress toward goals and other mental status changes:  The patient's progress toward goals is good.    Plan: Pt plans to continue individual psychotherapy CBT will be utilized in future individual therapy sessions to increase interaction, insight, and support.     Return to clinic: 2 weeks Jan 30th

## 2024-01-19 ENCOUNTER — OFFICE VISIT (OUTPATIENT)
Dept: PRIMARY CARE CLINIC | Facility: CLINIC | Age: 74
End: 2024-01-19
Payer: MEDICARE

## 2024-01-19 VITALS
BODY MASS INDEX: 31.87 KG/M2 | OXYGEN SATURATION: 96 % | SYSTOLIC BLOOD PRESSURE: 112 MMHG | HEIGHT: 63 IN | DIASTOLIC BLOOD PRESSURE: 64 MMHG | HEART RATE: 86 BPM | TEMPERATURE: 98 F | WEIGHT: 179.88 LBS

## 2024-01-19 DIAGNOSIS — C50.411 MALIGNANT NEOPLASM OF UPPER-OUTER QUADRANT OF RIGHT BREAST IN FEMALE, ESTROGEN RECEPTOR POSITIVE: ICD-10-CM

## 2024-01-19 DIAGNOSIS — F33.41 MDD (MAJOR DEPRESSIVE DISORDER), RECURRENT, IN PARTIAL REMISSION: ICD-10-CM

## 2024-01-19 DIAGNOSIS — K74.00 HEPATIC FIBROSIS DUE TO NONALCOHOLIC FATTY LIVER DISEASE: Chronic | ICD-10-CM

## 2024-01-19 DIAGNOSIS — J30.2 SEASONAL ALLERGIC RHINITIS, UNSPECIFIED TRIGGER: Chronic | ICD-10-CM

## 2024-01-19 DIAGNOSIS — E53.8 B12 DEFICIENCY: Primary | ICD-10-CM

## 2024-01-19 DIAGNOSIS — K76.0 HEPATIC FIBROSIS DUE TO NONALCOHOLIC FATTY LIVER DISEASE: Chronic | ICD-10-CM

## 2024-01-19 DIAGNOSIS — E66.09 CLASS 1 OBESITY DUE TO EXCESS CALORIES WITH BODY MASS INDEX (BMI) OF 33.0 TO 33.9 IN ADULT, UNSPECIFIED WHETHER SERIOUS COMORBIDITY PRESENT: Chronic | ICD-10-CM

## 2024-01-19 DIAGNOSIS — R00.0 SINUS TACHYCARDIA: ICD-10-CM

## 2024-01-19 DIAGNOSIS — Z17.0 MALIGNANT NEOPLASM OF UPPER-OUTER QUADRANT OF RIGHT BREAST IN FEMALE, ESTROGEN RECEPTOR POSITIVE: ICD-10-CM

## 2024-01-19 DIAGNOSIS — R26.89 BALANCE PROBLEM: Chronic | ICD-10-CM

## 2024-01-19 DIAGNOSIS — R42 VERTIGO: ICD-10-CM

## 2024-01-19 PROCEDURE — 99215 OFFICE O/P EST HI 40 MIN: CPT | Mod: S$PBB,,, | Performed by: INTERNAL MEDICINE

## 2024-01-19 PROCEDURE — 85025 COMPLETE CBC W/AUTO DIFF WBC: CPT | Performed by: INTERNAL MEDICINE

## 2024-01-19 PROCEDURE — 36415 COLL VENOUS BLD VENIPUNCTURE: CPT | Performed by: INTERNAL MEDICINE

## 2024-01-19 PROCEDURE — 99214 OFFICE O/P EST MOD 30 MIN: CPT | Mod: PBBFAC,PN | Performed by: INTERNAL MEDICINE

## 2024-01-19 PROCEDURE — 82607 VITAMIN B-12: CPT | Performed by: INTERNAL MEDICINE

## 2024-01-19 PROCEDURE — 82746 ASSAY OF FOLIC ACID SERUM: CPT | Performed by: INTERNAL MEDICINE

## 2024-01-19 PROCEDURE — 99999 PR PBB SHADOW E&M-EST. PATIENT-LVL IV: CPT | Mod: PBBFAC,,, | Performed by: INTERNAL MEDICINE

## 2024-01-19 RX ORDER — ONDANSETRON HYDROCHLORIDE 8 MG/1
4 TABLET, FILM COATED ORAL EVERY 8 HOURS PRN
Qty: 30 TABLET | Refills: 2 | Status: SHIPPED | OUTPATIENT
Start: 2024-01-19 | End: 2024-04-18

## 2024-01-19 RX ORDER — MECLIZINE HYDROCHLORIDE 25 MG/1
25 TABLET ORAL 3 TIMES DAILY PRN
Qty: 30 TABLET | Refills: 2 | Status: SHIPPED | OUTPATIENT
Start: 2024-01-19 | End: 2024-06-03

## 2024-01-19 RX ORDER — FLUTICASONE PROPIONATE 50 MCG
1 SPRAY, SUSPENSION (ML) NASAL DAILY
Qty: 16 G | Refills: 0 | Status: SHIPPED | OUTPATIENT
Start: 2024-01-19 | End: 2024-03-18

## 2024-01-19 NOTE — PATIENT INSTRUCTIONS
If you are feeling unwell, we'd like to be the first ones to know here at [facility]! Please give us a call. Same day appointments are our top priority to keep you well and out of the emergency rooms and hospitals. Call [phone] for our direct line. After hours advice is always available. Please call [phone] after hours to speak to the on-call team.      For vertigo - please also discuss w PT on Monday - vestibular therapy?  Trial meclizine as needed for dizziness - ondansetron as needed for nausea    If usual nasal flushes too irritating consider OTC saline water spray like Oceans - one spray each nares at least twice daily  Recommend trial intra-nasal steriod like Flonase daily for 4 weeks    Let us know if no improvement!

## 2024-01-19 NOTE — PROGRESS NOTES
Alison Lobo  01/19/2024  [id number]    Roselia Gillette MD  Patient Care Team:  Roselia Gillette MD as PCP - General (Internal Medicine)  Clarissa Stephenson NP as Nurse Practitioner (Family Medicine)  Ishan Whatley MD as Consulting Physician (Breast Surgery)    Visit Type: Follow-up    Chief Complaint:  Chief Complaint   Patient presents with    Follow-up     Three month follow up. Pt is having problems with vertigo and unsteady gait. Pt slipped on ice and has abrasions to her face.      History of Present Illness: Ms. Alison Lobo is a 73 year old female here for fall f/u.      Chronic medical issues include prediabetes, B Chun's neuroma/foot pain, JADA (CPAP), obesity, R breast cancer 2002, h/o L breast cancer 2018 (both small invasive ductal carcinoma), recurrent MDD, MEHNAZ, HLD, hypothyroid, recent diagnosis hepatic fibrosis due to NAFLD.    Reports vertigo symptoms have been worse usual past week   3 falls and 1 near fall this week:    Tues slipped on ice hit forehead, nose, L cheek  Also Tues? tripped over rake in garage fell onto R knee, scraped forehead  Wed tripped over shoes in house fell onto R side  Slid on water in bathroom today but caught herself   Vertigo most noted on rising in am and with head movements  She does reports some numbness in feet but attributes this to Chun's neuroma  Slight nausea w the vertigo    Still w decreased sense of taste overall and sometimes things she usually likes, taste bad  Flea bites healing    Has been working w PT and w [facility] LCSW.  Abstaining from alcohol per Hepatology rec's    From last visit w me 10/24/23   After appointment here yesterday went home felt nauseous, dizzy, fell backwards and hit back of head - no LOC - went to [facility] ED - 4 staples - CT head and C-Spine - IVF - labs - discharged home w instructions to have staples removed in 10 days (10/30/23).  Felt ok Sat and Sun except a little lightheaded moving from lying to stand. Feeling more  woozy, queasy this morning.  Food not tasting right since about July  C/o increased sinus allergy symptoms past couple of weeks - has been taking cetirizine - ran out of motelukast - has intra-nasal steriod at home but hasn't been using recently as allergy had been better w oral meds  PHQ-4 Score: 0     Recent appointments:   1/18/24 [facility] Gladis LCSW  1/09/24 [facility] Cook contact dermatitis  1/03/24 [facility] Gladis LCSW  12/28/23 Cards Reyes sinus tachycardia  12/11/23 [facility]is LCSW  12/04/23 [facility] Gladis LCSW  11/28/23 Hep Juan Miguel NAFLD f/u 6 mos    Working w [facility] on balance  3/08/23 [facility] Seema EAWV     Upcoming appointments:  Future Appointments       Next 10 Appointments       Date Provider Specialty Appt Notes    1/22/2024 Summer Garcia, PT Outpatient Rehab no copay  2x/8wks 2/22  Helder/Summer  [place]    1/25/2024 Connor Puckett, PT Outpatient Rehab no copay  2x/8wks 2/22  Helder/Summer  [place]    1/29/2024 Ishan Mayen, PTA Outpatient Rehab no copay  2x/8wks 2/22  Helder/Summer  [place]    1/30/2024 Madison Griffith, ALEXAW Primary Care LCSW Visit    2/1/2024 Connor Puckett, PT Outpatient Rehab no copay  2x/8wks 2/22  Helder/Summer  [place]    2/5/2024 Connor Puckett, PT Outpatient Rehab no copay  2x/8wks 2/22  Helder/Summer  [place]    2/8/2024 Connor Puckett, PT Outpatient Rehab no copay  2x/8wks 2/22  Helder/Summer  [place]    3/19/2024 Clarissa Stephenson, NP Primary Care AWV    4/22/2024 Ishan Whatley MD Breast Surgery MGM, CXR    6/4/2024  Radiology NAFLD (nonalcoholic fatty liver disease) [K76.0]  Hepatic fibrosis due to nonalcoholic fatty liver disease [K74.00, K76.0]              Displaying the next 10 appointments. This patient has additional appointments scheduled.           The following were reviewed: Active problem list, medication list, allergies, family history, social history, and Health Maintenance.     Medications have been reviewed and reconciled with patient at visit  today.    Review of Systems   See HPI above    Exam: sat 96%  Vitals:    01/19/24 1455   BP: 112/64   Pulse: 86   Temp: 98.1 °F (36.7 °C)     Weight: 81.6 kg (179 lb 14.3 oz)   Body mass index is 31.87 kg/m².    BP Readings from Last 3 Encounters:   01/19/24 112/64   01/09/24 132/62   12/28/23 122/62      Wt Readings from Last 3 Encounters:   01/19/24 1455 81.6 kg (179 lb 14.3 oz)   01/09/24 1317 82.3 kg (181 lb 8.8 oz)   12/28/23 1518 83.8 kg (184 lb 11.9 oz)      Physical Exam  Vitals reviewed.   Constitutional:       General: She is not in acute distress.     Appearance: Normal appearance. She is obese. She is not ill-appearing.   HENT:      Head: Normocephalic and atraumatic.      Right Ear: Ear canal and external ear normal.      Left Ear: Ear canal and external ear normal.      Ears:      Comments: ? R Serous effusion   L TM full      Nose: Nose normal.      Mouth/Throat:      Mouth: Mucous membranes are moist.      Pharynx: Oropharynx is clear.   Eyes:      General: No scleral icterus.     Extraocular Movements: Extraocular movements intact.      Conjunctiva/sclera: Conjunctivae normal.      Comments: No nystagmus   Neck:      Vascular: No carotid bruit.   Cardiovascular:      Rate and Rhythm: Normal rate and regular rhythm.      Heart sounds: No murmur heard.  Pulmonary:      Effort: Pulmonary effort is normal. No respiratory distress.      Breath sounds: No wheezing, rhonchi or rales.   Abdominal:      General: Bowel sounds are normal.      Palpations: Abdomen is soft.      Tenderness: There is no abdominal tenderness. There is no guarding.   Musculoskeletal:      Right lower leg: No edema.      Left lower leg: No edema.   Lymphadenopathy:      Cervical: No cervical adenopathy.   Skin:     General: Skin is warm and dry.      Comments: Scattered dry scabs of healing flea bits on arms  Small superficial abrasions on nose and forehead  Slight bruise L cheek below eye   Neurological:      General: No focal  "deficit present.      Mental Status: She is alert and oriented to person, place, and time. Mental status is at baseline.      Coordination: Coordination normal.      Comments: No tremor - no asterixis  Ambulates independently w/o AD  Moves independently from chair onto and off of exam table   Psychiatric:         Behavior: Behavior normal.         Thought Content: Thought content normal.     Laboratory Reviewed  Lab Results   Component Value Date    WBC 8.36 01/19/2024    HGB 14.4 01/19/2024    HCT 44.1 01/19/2024     01/19/2024    MCV 98 01/19/2024    CHOL 147 01/18/2024    TRIG 89 01/18/2024    HDL 43 01/18/2024    LDLCALC 86.2 01/18/2024    ALT 31 01/18/2024    AST 30 01/18/2024     01/18/2024    K 4.8 01/18/2024     01/18/2024    CREATININE 0.8 01/18/2024    BUN 16 01/18/2024    CO2 26 01/18/2024    MG 2.2 01/18/2024    TSH 2.763 01/18/2024    FREET4 0.90 01/18/2024    HGBA1C 5.5 01/18/2024    CRP 1.1 12/15/2022     Lab Results   Component Value Date    CALCIUM 9.8 01/18/2024    PHOS 3.3 11/07/2022      Lab Results   Component Value Date    LTEEAMSU04 375 01/19/2024     Lab Results   Component Value Date    FOLATE 6.4 01/19/2024    No results found for: "UIBC", "IRON", "TRANS", "TRANSFERRIN", "TIBC", "LABIRON", "FESATURATED"   Lab Results   Component Value Date    EGFRNORACEVR >60.0 01/18/2024    ALBUMIN 3.9 01/18/2024     Lab Results   Component Value Date    YPTJSVRP26YH 58 11/07/2022     BRG   CT HEAD 10/20/2023 No acute intracranial hemorrhage or mass effect. Ventricles nondistended. No acute bony finding    Assessment:   73 y.o. female with multiple co-morbid illnesses here for continued follow up of medical problems.      The primary encounter diagnosis was B12 deficiency. Diagnoses of Malignant neoplasm of upper-outer quadrant of right breast in female, estrogen receptor positive, MDD (major depressive disorder), recurrent, in partial remission, Sinus tachycardia, Class 1 obesity due to " excess calories with body mass index (BMI) of 33.0 to 33.9 in adult, unspecified whether serious comorbidity present, Balance problem, Seasonal allergic rhinitis, unspecified trigger, Vertigo, and Hepatic fibrosis due to nonalcoholic fatty liver disease were also pertinent to this visit.      Plan:   1. B12 deficiency  -     CBC Auto Differential; Future; Expected date: 01/19/2024  -     Vitamin B12; Future; Expected date: 01/19/2024  -     Folate; Future; Expected date: 01/19/2024    2. Malignant neoplasm of upper-outer quadrant of right breast in female, estrogen receptor positive  Assessment & Plan:  Cont tamoxifen and Q 6mos f/u Breast cancer specialist through 2028      3. MDD (major depressive disorder), recurrent, in partial remission  Assessment & Plan:  Cont current Rx - cont f/u [facility] LCSW - encourage to stay mobile and active      4. Sinus tachycardia  Assessment & Plan:  Tolerating metoprolol - cont current Rx - encourage increase movement, stress management       5. Class 1 obesity due to excess calories with body mass index (BMI) of 33.0 to 33.9 in adult, unspecified whether serious comorbidity present  Assessment & Plan:  Weight June 2023 193; Dec 2023 184; today 179   14 lbs wt loss over past 7 mos - BP at goal, HgbA1c wnl   Encourage maintain healthier food and beverage choices, movement      6. Balance problem  Assessment & Plan:  Has been working w PT on balance - balance problems exacerbated by vertigo at present w 3 falls in past week - consider vestibular Tx? Consider referral to ENT? Consider MRI/MRA of brain?       7. Seasonal allergic rhinitis, unspecified trigger  Assessment & Plan:  If usual nasal flushes too irritating consider OTC saline water spray like Oceans - one spray each nares at least twice daily  Recommend trial intra-nasal steriod like Flonase daily for 4 weeks      8. Vertigo  Assessment & Plan:  Has had issues w BPPV in the past - symptomatic Tx - address sinus allergies also  - can PT add vestibular Tx? - if no improvement consider refer to ENT       9. Hepatic fibrosis due to nonalcoholic fatty liver disease  Assessment & Plan:  Stable - agree w alcohol abstinence - cont routine surveillance per hepatology recs      Other orders  -     ondansetron (ZOFRAN) 8 MG tablet; Take 0.5 tablets (4 mg total) by mouth every 8 (eight) hours as needed for Nausea.  Dispense: 30 tablet; Refill: 2  -     meclizine (ANTIVERT) 25 mg tablet; Take 1 tablet (25 mg total) by mouth 3 (three) times daily as needed for Dizziness.  Dispense: 30 tablet; Refill: 2  -     fluticasone propionate (FLONASE) 50 mcg/actuation nasal spray; 1 spray (50 mcg total) by Each Nostril route once daily.  Dispense: 16 g; Refill: 0         Health Maintenance         Date Due Completion Date    RSV Vaccine (Age 60+ and Pregnant patients) (1 - 1-dose 60+ series) Never done ---    Hemoglobin A1c (Prediabetes) 01/18/2025 1/18/2024    Colorectal Cancer Screening 09/13/2025 9/13/2022    DEXA Scan 05/12/2027 5/12/2022    Lipid Panel 01/18/2029 1/18/2024    TETANUS VACCINE 05/09/2029 5/9/2019            -Patient's lab results were reviewed and discussed with patient  -Treatment options and alternatives were discussed with the patient. Patient expressed understanding. Patient was given the opportunity to ask questions and be an active participant in their medical care. Patient had no further questions or concerns at this time.   -Patient is an overall moderate to HIGH risk for health complications from their medical conditions.     Follow up: Follow up in about 2 months (around 3/19/2024) for Follow Up for DELMIS Romo.    After visit summary printed and given to patient upon discharge.  Patient goals and care plan are included in After visit summary.    TOTAL TIME evaluating and managing this patient for this encounter was greater than 60 minutes. This time was spent personally by me on some of the following activities: review of  patient's past medical history, assessing age-appropriate health maintenance needs, review of any interval history, review and interpretation of lab results, review and interpretation of imaging test results, review and interpretation of cardiology test results, reviewing consulting specialist notes, obtaining history from the patient and family, examination of the patient, medication reconciliation, managing and/or ordering prescription medications, ordering imaging tests, ordering referral to subspecialty provider(s), educating patient and answering their questions about diagnosis, treatment plan, and goals of treatment, discussing planned follow-up and final documentation of the visit. This time was exclusive of any separately billable procedures for this patient and exclusive of time spent treating any other patients.

## 2024-01-20 LAB
BASOPHILS # BLD AUTO: 0.06 K/UL (ref 0–0.2)
BASOPHILS NFR BLD: 0.7 % (ref 0–1.9)
DIFFERENTIAL METHOD BLD: ABNORMAL
EOSINOPHIL # BLD AUTO: 0.1 K/UL (ref 0–0.5)
EOSINOPHIL NFR BLD: 1.2 % (ref 0–8)
ERYTHROCYTE [DISTWIDTH] IN BLOOD BY AUTOMATED COUNT: 13.2 % (ref 11.5–14.5)
FOLATE SERPL-MCNC: 6.4 NG/ML (ref 4–24)
HCT VFR BLD AUTO: 44.1 % (ref 37–48.5)
HGB BLD-MCNC: 14.4 G/DL (ref 12–16)
IMM GRANULOCYTES # BLD AUTO: 0.04 K/UL (ref 0–0.04)
IMM GRANULOCYTES NFR BLD AUTO: 0.5 % (ref 0–0.5)
LYMPHOCYTES # BLD AUTO: 2.2 K/UL (ref 1–4.8)
LYMPHOCYTES NFR BLD: 26.6 % (ref 18–48)
MCH RBC QN AUTO: 31.9 PG (ref 27–31)
MCHC RBC AUTO-ENTMCNC: 32.7 G/DL (ref 32–36)
MCV RBC AUTO: 98 FL (ref 82–98)
MONOCYTES # BLD AUTO: 0.9 K/UL (ref 0.3–1)
MONOCYTES NFR BLD: 11 % (ref 4–15)
NEUTROPHILS # BLD AUTO: 5 K/UL (ref 1.8–7.7)
NEUTROPHILS NFR BLD: 60 % (ref 38–73)
NRBC BLD-RTO: 0 /100 WBC
PLATELET # BLD AUTO: 283 K/UL (ref 150–450)
PMV BLD AUTO: 11.4 FL (ref 9.2–12.9)
RBC # BLD AUTO: 4.51 M/UL (ref 4–5.4)
VIT B12 SERPL-MCNC: 375 PG/ML (ref 210–950)
WBC # BLD AUTO: 8.36 K/UL (ref 3.9–12.7)

## 2024-01-20 NOTE — PROGRESS NOTES
Pt has [facility] - if message below not viewed please call w information below:   Labwork from yesterday all looks good. Recommend decrease metformin to 2 tabs twice daily.     Please message or call with any questions or concerns!  Thank you!  Roselia Gillette MD, MPH  [facility]

## 2024-01-20 NOTE — PROGRESS NOTES
Pt has [facility] - if message below not viewed please call w information below:   Good day Ms. Lobo  Your B12 and folate levels are both at lower end of normal. Recommend adding a good quality B-complex supplement and rechecking these levels in a few months.     Please message or call with any questions or concerns!  Thank you!  Roselia Gillette MD, MPH  [facility]

## 2024-01-21 PROBLEM — C50.411 MALIGNANT NEOPLASM OF UPPER-OUTER QUADRANT OF RIGHT BREAST IN FEMALE, ESTROGEN RECEPTOR POSITIVE: Chronic | Status: ACTIVE | Noted: 2023-09-25

## 2024-01-21 PROBLEM — K76.0 HEPATIC FIBROSIS DUE TO NONALCOHOLIC FATTY LIVER DISEASE: Chronic | Status: ACTIVE | Noted: 2024-01-19

## 2024-01-21 PROBLEM — R42 VERTIGO: Status: ACTIVE | Noted: 2024-01-19

## 2024-01-21 PROBLEM — R00.0 SINUS TACHYCARDIA: Chronic | Status: ACTIVE | Noted: 2023-12-28

## 2024-01-21 PROBLEM — K74.00 HEPATIC FIBROSIS DUE TO NONALCOHOLIC FATTY LIVER DISEASE: Chronic | Status: ACTIVE | Noted: 2024-01-19

## 2024-01-21 PROBLEM — Z17.0 MALIGNANT NEOPLASM OF UPPER-OUTER QUADRANT OF RIGHT BREAST IN FEMALE, ESTROGEN RECEPTOR POSITIVE: Chronic | Status: ACTIVE | Noted: 2023-09-25

## 2024-01-21 NOTE — ASSESSMENT & PLAN NOTE
Weight June 2023 193; Dec 2023 184; today 179   14 lbs wt loss over past 7 mos - BP at goal, HgbA1c wnl   Encourage maintain healthier food and beverage choices, movement

## 2024-01-21 NOTE — ASSESSMENT & PLAN NOTE
Has been working w PT on balance - balance problems exacerbated by vertigo at present w 3 falls in past week - consider vestibular Tx? Consider referral to ENT? Consider MRI/MRA of brain?

## 2024-01-21 NOTE — ASSESSMENT & PLAN NOTE
Has had issues w BPPV in the past - symptomatic Tx - address sinus allergies also - can PT add vestibular Tx? - if no improvement consider refer to ENT

## 2024-01-21 NOTE — ASSESSMENT & PLAN NOTE
If usual nasal flushes too irritating consider OTC saline water spray like Oceans - one spray each nares at least twice daily  Recommend trial intra-nasal steriod like Flonase daily for 4 weeks

## 2024-01-22 ENCOUNTER — PATIENT MESSAGE (OUTPATIENT)
Dept: PRIMARY CARE CLINIC | Facility: CLINIC | Age: 74
End: 2024-01-22
Payer: MEDICARE

## 2024-01-22 ENCOUNTER — CLINICAL SUPPORT (OUTPATIENT)
Dept: REHABILITATION | Facility: HOSPITAL | Age: 74
End: 2024-01-22
Payer: MEDICARE

## 2024-01-22 DIAGNOSIS — R53.1 DECREASED STRENGTH, ENDURANCE, AND MOBILITY: ICD-10-CM

## 2024-01-22 DIAGNOSIS — R26.89 BALANCE PROBLEM: Primary | Chronic | ICD-10-CM

## 2024-01-22 DIAGNOSIS — Z74.09 DECREASED STRENGTH, ENDURANCE, AND MOBILITY: ICD-10-CM
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
## 2024-01-22 DIAGNOSIS — R42 VERTIGO: Primary | ICD-10-CM

## 2024-01-22 DIAGNOSIS — R68.89 DECREASED STRENGTH, ENDURANCE, AND MOBILITY: ICD-10-CM

## 2024-01-22 PROCEDURE — 97110 THERAPEUTIC EXERCISES: CPT

## 2024-01-22 PROCEDURE — 97112 NEUROMUSCULAR REEDUCATION: CPT

## 2024-01-22 PROCEDURE — 97530 THERAPEUTIC ACTIVITIES: CPT

## 2024-01-22 NOTE — PROGRESS NOTES
[facility]   Physical Therapy Treatment Note        Name: Alison Lobo  Clinic Number: [id number]    Therapy Diagnosis:   Encounter Diagnoses   Name Primary?    Balance problem Yes    Fall against object     Decreased strength, endurance, and mobility      Physician: Roselia Gillette MD    Visit Date: 1/22/2024    Physician Orders: PT Eval and Treat   Medical Diagnosis from Referral:  Balance problem   Fall (on) (from) unspecified stairs and steps, subsequent encounter   Fall against object   Evaluation Date: 11/22/2023  Authorization Period Expiration: 10/23/2024  Plan of Care Expiration: 2/22/2024  Progress Note Due: 2/4/2024  Visit # / Visits authorized: 1/20 (+1)   FOTO: 1/3      Precautions: Standard and Fall    Time In: 9:55 am  Time Out: 11:00 am  Total Billable Time: 60 minutes    SUBJECTIVE     Pt reports: she has had 3 falls last week, saw MD and they have given her antivert - which she did take this morning. She reports that she is dizzy when she first wakes up in the morning and if she turns her head quickly from side to side it feels like she is still and the world is rushing around her.   She reports that she feels off balance when she is walking.    She was compliant with home exercise program.    Response to previous treatment: dizziness with open book exercise    Functional change: no change    Pain: 0/10  Location:  no pain today    OBJECTIVE     Objective Measures updated at progress report unless specified.     TREATMENT     Alison received the treatments listed below:      therapeutic exercises to develop strength, endurance, ROM, flexibility, and core stabilization for 30 minutes including:      Intervention 1/22/2024   Parameters    Treadmill walking   x  1.5 mph x 10 min (warm up for 1 min)   Bike   5 minutes    Standing strengthening exercises (add theraband as able) x  x  x    - Hip Flexion - red theraband - 2x15  Hip Extension red theraband - 2x15   Hip  Abduction red theraband - 2x15   Hip Adduction  Single leg heel raises   Shuttle   5 Bands, 10x/ 3 sets   Squat   - Picking up 6# medicine ball from the floor - 2x10               Leg press x  seat 4, feet 2 65# 10x/5 sets (increase weight next visit)                neuromuscular re-education activities to improve: Balance, Coordination, Proprioception, and Posture for 20 minutes. The following activities were included:    Intervention 1/22/2024  Parameters   Standing hand touches - at parallel bars   -    -    -    -    x Tandem - 3 x 15 seconds   Single leg stance  - 3 x 30 - with vertical eye movement  Single leg stance - 3x30 with horizontal eye movement  Tandem stance with vertical eye movements 10x eyes, 1 x each leg  Tandem stance with horizontal head movements 5x each (dizzy after)  Small FELIX head nods, then head turns 5x each   Open book   7x each side DC dizzy after        bridge  10x/ 2 sets        Sidelying hips series  Abduction x 10   Circles x 10 clockwise/counter clockwise   X 10 forward/backward bicycles (added)    High hurdles - Balance walking over hurdles - 4 passes             Assessed for BPPV x By [facility], BP remains 120/82 throughout multiple positions and with testing, very mild nystagmus to left with jesse hallpike but no symptoms bilateral, smooth pursuits and saccadic eye movement produced no symptoms and only minimal nystagmus to left           Alison participated in dynamic functional therapeutic activities to improve functional performance for 15  minutes, including:    Intervention 1/22/2024  Parameters   Sit to stand from lower surfaces x 2x10 - 16 inches        Agility/balance   x    x Sidesteps 3 laps in parallel bars  Carioca 5 laps in parallel bars  Large forward steps  Backward walking/ froward high march - no hands 3 laps   Step downs - 6 inch box - trying to use one hand x 10x/each side 6 inch step   Step ups  x 10x/ 6in step each leg - single hand  support   Matrix - Knee Extension 15lbs plus one small weight - 2x15                                       Plan for Next Visit: progress as able        PATIENT EDUCATION AND HOME EXERCISES     Home Exercises Provided and Patient Education Provided     Education provided:   - HOME EXERCISE PROGRAM  11/30/2023: added home exercise program to my chart and gave instructed how to access on my chart .    Written Home Exercises Provided: Patient instructed to cont prior HEP. Exercises were reviewed and Alison was able to demonstrate them prior to the end of the session.  Alison demonstrated good  understanding of the education provided. See EMR under Patient Instructions for exercises provided during therapy sessions      ASSESSMENT     Patient tolerated all treatment well and was able to tolerate treatment as noted. Requested referral to ENT from primary care after BPPV testing today. Encouraged monitoring of symptoms and home exercise program - with head nods issued as well.     Alison Is progressing well towards her goals.   Pt prognosis is Good.     Pt will continue to benefit from skilled outpatient physical therapy to address the deficits listed in the problem list box on initial evaluation, provide pt/family education and to maximize pt's level of independence in the home and community environment.     Pt's spiritual, cultural and educational needs considered and pt agreeable to plan of care and goals.     Anticipated barriers to physical therapy:  Anxiety or Panic Disorders, Arthritis, BMI over 30, Cancer, Depression, Incontinence, Previous accidents, Sleep dysfunction    Goals:   Reviewed: 1/22/2024    Short Term Goals: In 4 weeks   1.Patient to be educated on HEP. - met  2.Patient single leg stance balance times to 5 seconds or greater  PC  3.Patient to increase bilateral lower extremities strength to 4/5 for improved sit to stand from lower surfaces PC  4.Patient to have pain less than 2/10 at worst, to  improve QOL. met  5.Patient to improve score on the FOTO, to improve QOL. PC  6. Patient to demonstrate improved squat to 75% PC - progressing     Long Term Goals: In 8 weeks  1.Patient to improve score on the FOTO to 57 or greater, to improve QOL. PC  2. Patient to increase bilateral lower extremities strength to 4/5 for improved sit to stand from lower surfaces PC  3. Patient to have decreased pain to 2/10 at worst, to improve QOL. met  4. Patient to demonstrate improved 5 second sit to stand score to 11 seconds PC  5. Patient single leg stance balance times to 5 seconds or greater  PC  6. Patient to perform on and off the ground without assistance. met  5. Patient to perform daily activities without increased symptoms including.  Walking on grass or uneven surfaces met  Prolonged walking without complaint of fatigue PC  Squat to ground without apprehension PC       PLAN     Monitor response to today's treatment session and progress with Physical Therapy plan of care as indicated.    Plan of care Certification: 11/22/2023 to 2/22/2024.     Outpatient Physical Therapy 2 times weekly for 10 weeks to include the following interventions: Gait Training, Manual Therapy, Neuromuscular Re-ed, Patient Education, Self Care, Therapeutic Activities, and Therapeutic Exercise.     Summer Garcia, PT

## 2024-01-23 ENCOUNTER — TELEPHONE (OUTPATIENT)
Dept: PRIMARY CARE CLINIC | Facility: CLINIC | Age: 74
End: 2024-01-23
Payer: MEDICARE

## 2024-01-25 ENCOUNTER — OFFICE VISIT (OUTPATIENT)
Dept: OTOLARYNGOLOGY | Facility: CLINIC | Age: 74
End: 2024-01-25
Payer: MEDICARE

## 2024-01-25 VITALS — WEIGHT: 179.88 LBS | BODY MASS INDEX: 31.87 KG/M2

## 2024-01-25 DIAGNOSIS — R42 DIZZINESS: Primary | ICD-10-CM

## 2024-01-25 DIAGNOSIS — H93.13 TINNITUS OF BOTH EARS: ICD-10-CM

## 2024-01-25 DIAGNOSIS — R26.89 BALANCE PROBLEMS: ICD-10-CM

## 2024-01-25 PROCEDURE — 99214 OFFICE O/P EST MOD 30 MIN: CPT | Mod: S$PBB,,, | Performed by: PHYSICIAN ASSISTANT

## 2024-01-25 PROCEDURE — 99999 PR PBB SHADOW E&M-EST. PATIENT-LVL IV: CPT | Mod: PBBFAC,,, | Performed by: PHYSICIAN ASSISTANT

## 2024-01-25 PROCEDURE — 99214 OFFICE O/P EST MOD 30 MIN: CPT | Mod: PBBFAC | Performed by: PHYSICIAN ASSISTANT

## 2024-01-25 NOTE — PROGRESS NOTES
Subjective     Patient ID: Alison Lobo is a 73 y.o. female.    Chief Complaint: Dizziness (Dizziness this morning and also had some nausea this morning , last for about a min, dizziness happens when she turns her head )    Patient is a very pleasant 73 y.o. female here to see me today for the first time in consultation at the request of  for evaluation of dizziness.   She reports that the symptoms have been present for the last 2 months.  She describes initially feeling clumsy and off balance.  Triggered by with sudden changes in position or sudden head movements.  Had nausea with these episodes as well.  Now she feels brief spinning with the overall imbalance.  On average, the patient reports symptoms that occur approximately few times each day.  She describes the dizziness as a sensation of unsteadiness, a sensation of movement of surroundings, and imbalance and says that it lasts seconds.  She has fallen a few times recently (tripped in garage, woozy while on steps).  She has been working with balance therapy and they thought possible BPPV and recommended she see ENT.  She denies aural pressure, otalgia, otorrhea, and hearing loss.  She has had tinnitus AU, pulsatile at times for years.  She previously followed with ENT in [place]; reports having three severe, debilitating episodes of vertigo at that time (> 3 years ago).  States this dizziness is not as severe and feels slightly different.  She has started any new medications (Antivert - took this AM), and has had recent dietary changes (now following Mediterranean diet).          Review of Systems   Constitutional:  Negative for fever.   HENT:  Positive for postnasal drip, tinnitus and voice change. Negative for ear discharge, ear pain and hearing loss.    Eyes:  Positive for itching.   Cardiovascular: Negative.    Gastrointestinal:  Positive for nausea.   Endocrine: Positive for heat intolerance.   Genitourinary: Negative.    Integumentary:   Positive for rash.   Neurological:  Positive for dizziness, vertigo and light-headedness.   Hematological: Negative.    Psychiatric/Behavioral:  Positive for sleep disturbance. The patient is nervous/anxious.           Objective     Physical Exam  Constitutional:       General: She is not in acute distress.     Appearance: She is well-developed.   HENT:      Head: Normocephalic and atraumatic.      Right Ear: Tympanic membrane, ear canal and external ear normal. No middle ear effusion. Tympanic membrane is not injected or erythematous.      Left Ear: Tympanic membrane, ear canal and external ear normal.  No middle ear effusion. Tympanic membrane is not injected or erythematous.      Nose: Congestion present. No rhinorrhea.      Right Sinus: No maxillary sinus tenderness or frontal sinus tenderness.      Left Sinus: No maxillary sinus tenderness or frontal sinus tenderness.      Mouth/Throat:      Mouth: Mucous membranes are moist. Mucous membranes are not pale and not dry.      Dentition: Normal dentition.      Pharynx: Oropharynx is clear. Uvula midline. No oropharyngeal exudate or posterior oropharyngeal erythema.   Eyes:      General: Lids are normal. No scleral icterus.     Extraocular Movements:      Right eye: Normal extraocular motion and no nystagmus.      Left eye: Normal extraocular motion and no nystagmus.      Conjunctiva/sclera: Conjunctivae normal.      Right eye: Right conjunctiva is not injected. No chemosis.     Left eye: Left conjunctiva is not injected. No chemosis.     Pupils: Pupils are equal, round, and reactive to light.   Neck:      Trachea: Trachea and phonation normal. No tracheal deviation.   Pulmonary:      Effort: Pulmonary effort is normal. No respiratory distress.      Breath sounds: No stridor.   Lymphadenopathy:      Head:      Right side of head: No preauricular or posterior auricular adenopathy.      Left side of head: No preauricular or posterior auricular adenopathy.       Cervical: No cervical adenopathy.   Skin:     General: Skin is warm and dry.      Findings: No erythema or rash.   Neurological:      Mental Status: She is alert and oriented to person, place, and time.      Cranial Nerves: No cranial nerve deficit.   Psychiatric:         Behavior: Behavior normal. Behavior is cooperative.       Lorraine-Hallpike:  Negative for nystagmus AU and patient remained asymptomatic bilaterally         Assessment and Plan     1. Dizziness  -     Ambulatory referral/consult to ENT    2. Balance problems    3. Tinnitus of both ears      Testing today is negative for BPPV.  She did take Meclizine this AM which could be suppressing her response.    I had a long discussion with the patient regarding their symptoms.  Dizziness, vertigo and disequilibrium are common symptoms reported by adults during visits to their doctors. They are all symptoms that can result from a peripheral vestibular disorder (a dysfunction of the balance organs of the inner ear) or central vestibular disorder (a dysfunction of one or more parts of the central nervous system that help process balance and spatial information).  There are also non-vestibular causes of dizziness, and dizziness can be linked to a wide array of problems such as blood-flow irregularities from cardiovascular problems and blood pressure fluctuations.  I would recommend a VNG with audiogram for further diagnostic testing, and will contact the patient with further recommendations when that is complete.  If vestibulopathy noted, would recommend she continue with vestibular therapy.  Instructed her to hold Meclizine for at least 72 hours prior to testing.  If central findings noted, would recommend referral to Neurology and would also consider MRI Brain.    Tinnitus is most often caused by a hearing loss, and that as the hair cells are damaged, either genetic or as a result of loud noise exposure, they then cause tinnitus.  Some patients find that  restricting the salt or caffeine in their diet helps, and there is also an OTC supplement, lipoflavinoids, that some people find to be effective though their benefit is not fully proven.  Tinnitus tends to be louder in times of stress and fatigue, and may decrease with time.  Sound machines may also be an effective masking technique if needed at night.             No follow-ups on file.

## 2024-01-26 ENCOUNTER — CLINICAL SUPPORT (OUTPATIENT)
Dept: REHABILITATION | Facility: HOSPITAL | Age: 74
End: 2024-01-26
Payer: MEDICARE

## 2024-01-26 DIAGNOSIS — Z74.09 DECREASED STRENGTH, ENDURANCE, AND MOBILITY: ICD-10-CM

## 2024-01-26 DIAGNOSIS — R26.89 BALANCE PROBLEM: Primary | Chronic | ICD-10-CM

## 2024-01-26 DIAGNOSIS — R73.03 PREDIABETES: Chronic | ICD-10-CM

## 2024-01-26 DIAGNOSIS — R53.1 DECREASED STRENGTH, ENDURANCE, AND MOBILITY: ICD-10-CM
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
## 2024-01-26 DIAGNOSIS — R68.89 DECREASED STRENGTH, ENDURANCE, AND MOBILITY: ICD-10-CM

## 2024-01-26 PROCEDURE — 97112 NEUROMUSCULAR REEDUCATION: CPT | Mod: CQ

## 2024-01-26 PROCEDURE — 97530 THERAPEUTIC ACTIVITIES: CPT | Mod: CQ

## 2024-01-26 PROCEDURE — 97110 THERAPEUTIC EXERCISES: CPT | Mod: CQ

## 2024-01-26 RX ORDER — METFORMIN HYDROCHLORIDE 500 MG/1
TABLET ORAL
Qty: 225 TABLET | Refills: 1 | Status: SHIPPED | OUTPATIENT
Start: 2024-01-26 | End: 2024-05-02

## 2024-01-26 NOTE — PROGRESS NOTES
[facility]   Physical Therapy Treatment Note        Name: Alison Lobo  Clinic Number: [id number]    Therapy Diagnosis:   Encounter Diagnoses   Name Primary?    Balance problem Yes    Fall against object     Decreased strength, endurance, and mobility      Physician: Roselia Gillette MD    Visit Date: 1/26/2024    Physician Orders: PT Eval and Treat   Medical Diagnosis from Referral:  Balance problem   Fall (on) (from) unspecified stairs and steps, subsequent encounter   Fall against object   Evaluation Date: 11/22/2023  Authorization Period Expiration: 10/23/2024  Plan of Care Expiration: 2/22/2024  Progress Note Due: 2/4/2024  Visit # / Visits authorized: 3/20 (+1)   FOTO: 1/3      Precautions: Standard and Fall    Time In: 1300  Time Out: 1400  Total Billable Time: 55 minutes    SUBJECTIVE     Pt reports: dizzy when she first wakes up in the morning and if she turns her head quickly from side to side     She was compliant with home exercise program.    Response to previous treatment: dizziness with open book exercise    Functional change: weakness in lower extremities     Pain: 0/10  Location:  no pain today    OBJECTIVE     Objective Measures updated at progress report unless specified.     TREATMENT     Alison received the treatments listed below:      therapeutic exercises to develop strength, endurance, ROM, flexibility, and core stabilization for 25 minutes including:      Intervention 1/26/2024   Parameters    Treadmill walking   x  1.5 mph x 10 minutes    nustep x 6 minutes level 3    Bike   5 minutes    Standing strengthening exercises (add theraband as able)   x  x    x Hip Flexion - 3 x 10  Hip Extension 3 x 10  Hip Abduction 3 x 10  Hip Adduction  heel raises 3 x 10   Shuttle x  5 Bands, 10x/ 3 sets   Squat   - Picking up 6# medicine ball from the floor - 2x10   Heel raises (added)   x  3 x 10 2 hand support on parallel bars          Leg press   seat 4, feet 2 65#  10x/5 sets (increase weight next visit)                neuromuscular re-education activities to improve: Balance, Coordination, Proprioception, and Posture for 14 minutes. The following activities were included:    Intervention 1/26/2024  Parameters   Standing hand touches - at parallel bars x  -    -    -    -    x Tandem - 3 x 15 seconds   Single leg stance  - 3 x 30 - with vertical eye movement  Single leg stance - 3x30 with horizontal eye movement  Tandem stance with vertical eye movements 10x eyes, 1 x each leg  Tandem stance with horizontal head movements 5x each (dizzy after)  Small FELIX head nods, then head turns 5x each   Open book   7x each side DC dizzy after   Clams (added)  x 3 x 10 yellow band bilateral    bridge x 3 x 12 (increased)    Toe raises (added)  x 3 x 10 on airex pad 2 hand support on parallel bars    Sidelying hips series  Abduction x 10   Circles x 10 clockwise/counter clockwise   X 10 forward/backward bicycles (added)    High hurdles - Balance walking over hurdles - 4 passes             Assessed for BPPV  By [facility], BP remains 120/82 throughout multiple positions and with testing, very mild nystagmus to left with jesse hallpike but no symptoms bilateral, smooth pursuits and saccadic eye movement produced no symptoms and only minimal nystagmus to left           Alison participated in dynamic functional therapeutic activities to improve functional performance for 16  minutes, including:    Intervention 1/26/2024  Parameters   Sit to stand from lower surfaces  2x10 - 16 inches        Agility/balance x  x     Sidesteps 3 laps in parallel bars  Carioca 5 laps in parallel bars  Large forward steps  Backward walking/ froward high march - no hands 3 laps   Step downs - 6 inch box - trying to use one hand  10x/each side 6 inch step   Step ups  x 10x/ 6in step each leg - single hand support   Matrix - Knee Extension 15lbs plus one small weight - 2x15   Clinic stairs (added)  x  5 x alternating steps with each foot 2 rails                                  Plan for Next Visit: progress as able        PATIENT EDUCATION AND HOME EXERCISES     Home Exercises Provided and Patient Education Provided     Education provided:   - HOME EXERCISE PROGRAM  11/30/2023: added home exercise program to my chart and gave instructed how to access on my chart .    Written Home Exercises Provided: Patient instructed to cont prior HEP. Exercises were reviewed and Alison was able to demonstrate them prior to the end of the session.  Alison demonstrated good  understanding of the education provided. See EMR under Patient Instructions for exercises provided during therapy sessions      ASSESSMENT     Patient demonstrated decreased balance and confidence with cross over steps moving to her right more than moving to her left. She was unable to perform this exercises without upper extremity support. She was able to be progressed with standing of unstable surface with toe raises.     Alison Is progressing well towards her goals.   Pt prognosis is Good.     Pt will continue to benefit from skilled outpatient physical therapy to address the deficits listed in the problem list box on initial evaluation, provide pt/family education and to maximize pt's level of independence in the home and community environment.     Pt's spiritual, cultural and educational needs considered and pt agreeable to plan of care and goals.     Anticipated barriers to physical therapy:  Anxiety or Panic Disorders, Arthritis, BMI over 30, Cancer, Depression, Incontinence, Previous accidents, Sleep dysfunction    Goals:   Reviewed: 1/26/2024    Short Term Goals: In 4 weeks   1.Patient to be educated on HEP. - met  2.Patient single leg stance balance times to 5 seconds or greater  PC  3.Patient to increase bilateral lower extremities strength to 4/5 for improved sit to stand from lower surfaces PC  4.Patient to have pain less than 2/10 at  worst, to improve QOL. met  5.Patient to improve score on the FOTO, to improve QOL. PC  6. Patient to demonstrate improved squat to 75% PC - progressing     Long Term Goals: In 8 weeks  1.Patient to improve score on the FOTO to 57 or greater, to improve QOL. PC  2. Patient to increase bilateral lower extremities strength to 4/5 for improved sit to stand from lower surfaces PC  3. Patient to have decreased pain to 2/10 at worst, to improve QOL. met  4. Patient to demonstrate improved 5 second sit to stand score to 11 seconds PC  5. Patient single leg stance balance times to 5 seconds or greater  PC  6. Patient to perform on and off the ground without assistance. met  5. Patient to perform daily activities without increased symptoms including.  Walking on grass or uneven surfaces met  Prolonged walking without complaint of fatigue PC  Squat to ground without apprehension PC       PLAN     Monitor response to today's treatment session and progress with Physical Therapy plan of care as indicated.    Plan of care Certification: 11/22/2023 to 2/22/2024.     Outpatient Physical Therapy 2 times weekly for 10 weeks to include the following interventions: Gait Training, Manual Therapy, Neuromuscular Re-ed, Patient Education, Self Care, Therapeutic Activities, and Therapeutic Exercise.     Ishan Eduardo, PTA

## 2024-01-29 ENCOUNTER — DOCUMENTATION ONLY (OUTPATIENT)
Dept: REHABILITATION | Facility: HOSPITAL | Age: 74
End: 2024-01-29
Payer: MEDICARE

## 2024-01-30 ENCOUNTER — CLINICAL SUPPORT (OUTPATIENT)
Dept: PRIMARY CARE CLINIC | Facility: CLINIC | Age: 74
End: 2024-01-30
Payer: MEDICARE

## 2024-01-30 DIAGNOSIS — F33.1 MODERATE EPISODE OF RECURRENT MAJOR DEPRESSIVE DISORDER: Primary | ICD-10-CM

## 2024-01-30 PROCEDURE — 99499 UNLISTED E&M SERVICE: CPT | Mod: S$PBB,,,

## 2024-01-30 NOTE — PROGRESS NOTES
"Individual Psychotherapy (Formerly [facility])  Alison Lobo  1/30/2024  DATE:  1/30/2024  TYPE OF VISIT:  In person  LENGTH OF SESSION: 45    Therapeutic Intervention: Met with patient for individual psychotherapy.    Chief complaint/reason for encounter: depression, anxiety, sleep, and appetite       Session Content/Presenting Problem:    Patient has been dealing with vertigo; she will be getting her crystals adjusted and doing therapy for the vertigo.  Patient feels she has been more aware of distorted thoughts regarding her daughter Rachel. She is working on restructuring her thougts and identifying alternatives to her negative thoughts. Patient notes similarity in her history of cutting contact with others to what Rachel has now done to her. "I leave people; when it get's hard, I just go away." Discussed the letter from Rachel.  Patient was encouraged to consider alternatives to her negative thoughts regarding the letter and her interpretation of Rachel's feelings.  She saw the letter as angry and vindictive; discussed alternative interpretation of the letter's tone.  Patient plans to focus on her relationship with her two grandkids; she has worked on restructuring her negative thoughts which had her feeling forgotten and ignored by the kids. She is able to acknowledge how kids communicate through texts; she has recent texts from [employer] indicating a desire to see patient soon. Encouraged patient to try to arrange some time with them during the summer.  Patient expresses a desire to continue to work on increasing her insight of negative thought patterns and to focus on improving her communication and interactions with others. Discussed possibly contacting family members she broke contact with long ago.         Prior Session:  Patient has decided not to go out to [place] to see her grandkids. Discusses her discomfort in trying to balance her moral obligation to the kids and desire to see them with feeling hurt by their " "lack of communication. Patient did not send Gunter presents to them this year because she feels cut off. She has not heard any interest from the kids in her visiting.  Normalized the grandkids' behavior; patient does believe they are "just being kids." She will reach out to them and their dad soon to discuss making some plans.    Patient discusses being more accepting of living here; she does not want to ever move again.  She enjoys her time spent at the [employer] and has made good friends there.  Discussed the concept of embracing what she enjoys rather than focusing on what she does not enjoy. "Take what you need and leave the rest."  She feels the [employer] is a comfort zone.  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   As part of her self care, patient has started a course a friend is hosting. Says she has realized she has been asking herself the wrong question: Am I relevant?  She has decided to focus on asking the question: how can I be relevant to other people.   Beatriz Cespedes's sister, Judy has recently been diagnosed with Alzheimers. Patient has had difficulty dealing with Judy and had stopped talking to her. Has felt guilty and anxious about avoiding Judy for a while.  After hearing of the diagnosis, she called Judy. Patient explores how she can be helpful to Judy while not becoming overly involved.  Discussed her tendency to avoid others when she feels anxious. When Judy was calling her too much, she chose to ignore her calls. This seemed to fix the problem, but actually exacerbated her feelings of guilt and anxiety. Discussed changing the narrative to take control of the situation.  Patient will focus on being proactive in offering her time and help in areas she is comfortable.  She has decided she will start with helping with Judy's paperwork.  This will allow her to be helpful in a form that is comfortable for her; she will then not " "have guilt if she refuses other activities she does not like.   Patient will challenge her negative thought of   "You have no control". She will take control in order to minimize her anxiety.     Current symptoms:  Depression: anhedonia, worthlessness/guilt, hopelessness, and decreased appetite.  Anxiety: excessive worrying and restlessness.  Insomnia: non-restful sleep.  Heidi:  denies.  Psychosis: denies .      Risk parameters:  Patient reports no suicidal ideation  Patient reports no homicidal ideation  Patient reports no self-injurious behavior  Patient reports no violent behavior    MENTAL HEALTH STATUS EXAM  General Appearance:  unremarkable, age appropriate   Speech: normal tone, normal rate, normal pitch, normal volume      Level of Cooperation: cooperative      Thought Processes: normal and logical   Mood: steady      Thought Content: normal, no suicidality, no homicidality, delusions, or paranoia   Affect: congruent and appropriate   Orientation: Oriented x3   Attention Span & Concentration: intact   Fund of General Knowledge: intact and appropriate to age and level of education   Judgment & Insight: good     Language  intact       STRENGTHS AND LIABILITIES: Strength: Patient accepts guidance/feedback, Strength: Patient is expressive/articulate., Strength: Patient has reasonable judgment., Strength: Patient is stable.    IMPRESSION:   My diagnostic impression is Anxiety disorders; generalized anxiety disorder [F41.1] and Major Depressive Disorder, Recurrent, Mild (F33.0), as evidenced by feeling down, depressed, feeling irritable, difficulty sleeping, poor appetite, worrying too much.     TREATMENT GOALS (per patient):    "To feel more relevant. To experience calmness and peacefulness." To explore the estrangement from her only child, Rachel.  "How can I be relevant to other people."   Exercise, sleep better, increase enjoyable activities.     Treatment plan:  Target symptoms: recurrent depression, " anxiety , adjustment  Why chosen therapy is appropriate versus another modality: relevant to diagnosis, patient responds to this modality, evidence based practice  Outcome monitoring methods: self-report, observation, checklist/rating scale  Therapeutic intervention type: insight oriented psychotherapy    Patient's response to intervention:  The patient's response to intervention is guarded.    Progress toward goals and other mental status changes:  The patient's progress toward goals is good.    Plan: Pt plans to continue individual psychotherapy CBT will be utilized in future individual therapy sessions to increase interaction, insight, and support.     Return to clinic: 3 weeks Feb 20th

## 2024-02-01 ENCOUNTER — CLINICAL SUPPORT (OUTPATIENT)
Dept: REHABILITATION | Facility: HOSPITAL | Age: 74
End: 2024-02-01
Payer: MEDICARE

## 2024-02-01 DIAGNOSIS — Z74.09 DECREASED STRENGTH, ENDURANCE, AND MOBILITY: ICD-10-CM

## 2024-02-01 DIAGNOSIS — R53.1 DECREASED STRENGTH, ENDURANCE, AND MOBILITY: ICD-10-CM
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
## 2024-02-01 DIAGNOSIS — R68.89 DECREASED STRENGTH, ENDURANCE, AND MOBILITY: ICD-10-CM

## 2024-02-01 DIAGNOSIS — R26.89 BALANCE PROBLEM: Primary | Chronic | ICD-10-CM

## 2024-02-01 PROCEDURE — 97110 THERAPEUTIC EXERCISES: CPT

## 2024-02-01 PROCEDURE — 97112 NEUROMUSCULAR REEDUCATION: CPT

## 2024-02-01 PROCEDURE — 97530 THERAPEUTIC ACTIVITIES: CPT

## 2024-02-01 NOTE — PROGRESS NOTES
[facility]   Physical Therapy Treatment Note        Name: Alison Lobo  Clinic Number: [id number]    Therapy Diagnosis:   Encounter Diagnoses   Name Primary?    Balance problem Yes    Fall against object     Decreased strength, endurance, and mobility      Physician: Roselia Gillette MD    Visit Date: 2/1/2024    Physician Orders: PT Eval and Treat   Medical Diagnosis from Referral:  Balance problem   Fall (on) (from) unspecified stairs and steps, subsequent encounter   Fall against object   Evaluation Date: 11/22/2023  Authorization Period Expiration: 10/23/2024  Plan of Care Expiration: 2/22/2024  Progress Note Due: 2/4/2024  Visit # / Visits authorized: 3/20 (+1)   FOTO: 1/3      Precautions: Standard and Fall    Time In: 1000  Time Out: 1100  Total Billable Time: 60 minutes    SUBJECTIVE     Pt reports: that she continues to experience a bit of dizziness.  No significant symptoms with     She was compliant with home exercise program.    Response to previous treatment: dizziness with open book exercise    Functional change: weakness in lower extremities     Pain: 0/10  Location:  no pain today    OBJECTIVE     Objective Measures updated at progress report unless specified.     TREATMENT     Alison received the treatments listed below:      therapeutic exercises to develop strength, endurance, ROM, flexibility, and core stabilization for 15 minutes including:      Intervention 2/1/2024   Parameters    Treadmill walking   x  2.1 mph x 10 minutes    nustep  6 minutes level 3    Bike   5 minutes    Standing strengthening exercises (add theraband as able) x  x  x    x Hip Flexion - 3 x 10  Hip Extension 3 x 10  Hip Abduction 3 x 10  Hip Adduction  heel raises 3 x 10   Shuttle   5 Bands, 10x/ 3 sets   Squat   - Picking up 6# medicine ball from the floor - 2x10   Heel raises (added)   x  3 x 10 2 hand support on parallel bars          Leg press   seat 4, feet 2 65# 10x/5 sets  (increase weight next visit)                neuromuscular re-education activities to improve: Balance, Coordination, Proprioception, and Posture for 30 minutes. The following activities were included:    Intervention 2/1/2024  Parameters   Standing hand touches - at parallel bars x  x        x    x     Tandem - 10 rep switches x 2 minutes  Single leg stance  - 10 rep switching x 2 minutes  Single leg stance - 3x30 with horizontal eye movement  Tandem stance with vertical eye movements 10x eyes, 1 x each leg  Tandem stance with horizontal head movements 5x each (dizzy after)  Small FELIX head nods, then head turns 5x each   Tandem stance with eye movement   X  X  X  X  X  X      X  X  X  x Smooth pursuits:  Horizontal eye - 60 Hz x 1m  Vertical eye - 60 Hz x 1m  Diagonal eye - 60 Hz x 1m  Horizontal eye - 80 Hz x 1m  Vertical eye - 80 Hz x 1m  Diagonal eye - 80 Hz x 1m    VOR  Vertical - 60 Hz x 1m  Horizontal - 60 Hz x 1m  Vertical - 80 Hz x 1m  Horizontal - 80 Hz x 1m   Open book   7x each side DC dizzy after   Clams (added)   3 x 10 yellow band bilateral    bridge  3 x 12 (increased)    Toe raises (added)   3 x 10 on airex pad 2 hand support on parallel bars    Sidelying hips series  Abduction x 10   Circles x 10 clockwise/counter clockwise   X 10 forward/backward bicycles (added)    High hurdles - Balance walking over hurdles - 4 passes             Assessed for BPPV x Lorraine Hallpike negative for nystagmus, did have symptoms of dizziness when moving from supine to tall sitting but no nystagmus          Alison participated in dynamic functional therapeutic activities to improve functional performance for 16  minutes, including:    Intervention 2/1/2024  Parameters   Sit to stand from lower surfaces  2x10 - 16 inches        Agility/balance x  x     Sidesteps 3 laps in parallel bars  Carioca 5 laps in parallel bars  Large forward steps  Backward walking/ froward high march - no hands 3 laps   Step downs - 6 inch box -  trying to use one hand x 10x/each side 6 inch step   Step ups  x 2x10 - 8 in step each leg - single hand support   Matrix - Knee Extension 15lbs plus one small weight - 2x15   Clinic stairs (added)   5 x alternating steps with each foot 2 rails                                  Plan for Next Visit: progress as able        PATIENT EDUCATION AND HOME EXERCISES     Home Exercises Provided and Patient Education Provided     Education provided:   - HOME EXERCISE PROGRAM  11/30/2023: added home exercise program to my chart and gave instructed how to access on my chart .    Written Home Exercises Provided: Patient instructed to cont prior HEP. Exercises were reviewed and Alison was able to demonstrate them prior to the end of the session.  Alison demonstrated good  understanding of the education provided. See EMR under Patient Instructions for exercises provided during therapy sessions      ASSESSMENT     Patient reports that she does still experience dizziness symptoms.  Still a bit of nystagmus with right upward rotation exercises of the eye.  Occasionally also with diagonal eye movements to the left.  No problems with [place]-Hallpike except when coming back to upright.  At that point, there really is no nystagmus.  She does report of problems with optokinetics when there is a busy background.  Worked again on tuning out some of the background to focus on the main objects.  Will continue to progress into more upright activities with continued treatment.    Alison Is progressing well towards her goals.   Pt prognosis is Good.     Pt will continue to benefit from skilled outpatient physical therapy to address the deficits listed in the problem list box on initial evaluation, provide pt/family education and to maximize pt's level of independence in the home and community environment.     Pt's spiritual, cultural and educational needs considered and pt agreeable to plan of care and goals.     Anticipated barriers to  physical therapy:  Anxiety or Panic Disorders, Arthritis, BMI over 30, Cancer, Depression, Incontinence, Previous accidents, Sleep dysfunction    Goals:   Reviewed: 2/1/2024    Short Term Goals: In 4 weeks   1.Patient to be educated on HEP. - met  2.Patient single leg stance balance times to 5 seconds or greater  PC  3.Patient to increase bilateral lower extremities strength to 4/5 for improved sit to stand from lower surfaces PC  4.Patient to have pain less than 2/10 at worst, to improve QOL. met  5.Patient to improve score on the FOTO, to improve QOL. PC  6. Patient to demonstrate improved squat to 75% PC - progressing     Long Term Goals: In 8 weeks  1.Patient to improve score on the FOTO to 57 or greater, to improve QOL. PC  2. Patient to increase bilateral lower extremities strength to 4/5 for improved sit to stand from lower surfaces PC  3. Patient to have decreased pain to 2/10 at worst, to improve QOL. met  4. Patient to demonstrate improved 5 second sit to stand score to 11 seconds PC  5. Patient single leg stance balance times to 5 seconds or greater  PC  6. Patient to perform on and off the ground without assistance. met  5. Patient to perform daily activities without increased symptoms including.  Walking on grass or uneven surfaces met  Prolonged walking without complaint of fatigue PC  Squat to ground without apprehension PC       PLAN     Monitor response to today's treatment session and progress with Physical Therapy plan of care as indicated.    Plan of care Certification: 11/22/2023 to 2/22/2024.     Outpatient Physical Therapy 2 times weekly for 10 weeks to include the following interventions: Gait Training, Manual Therapy, Neuromuscular Re-ed, Patient Education, Self Care, Therapeutic Activities, and Therapeutic Exercise.     Connor Puckett, PT

## 2024-02-05 ENCOUNTER — CLINICAL SUPPORT (OUTPATIENT)
Dept: REHABILITATION | Facility: HOSPITAL | Age: 74
End: 2024-02-05
Payer: MEDICARE

## 2024-02-05 DIAGNOSIS — R26.89 BALANCE PROBLEM: Primary | Chronic | ICD-10-CM

## 2024-02-05 DIAGNOSIS — R68.89 DECREASED STRENGTH, ENDURANCE, AND MOBILITY: ICD-10-CM

## 2024-02-05 DIAGNOSIS — Z74.09 DECREASED STRENGTH, ENDURANCE, AND MOBILITY: ICD-10-CM

## 2024-02-05 DIAGNOSIS — R53.1 DECREASED STRENGTH, ENDURANCE, AND MOBILITY: ICD-10-CM
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
## 2024-02-05 PROCEDURE — 97112 NEUROMUSCULAR REEDUCATION: CPT

## 2024-02-05 PROCEDURE — 97110 THERAPEUTIC EXERCISES: CPT

## 2024-02-07 NOTE — PROGRESS NOTES
[facility]   Physical Therapy Treatment Note        Name: Alison Lobo  Clinic Number: [id number]    Therapy Diagnosis:   Encounter Diagnoses   Name Primary?    Balance problem Yes    Fall against object     Decreased strength, endurance, and mobility      Physician: Roselia Gillette MD    Visit Date: 2/5/2024    Physician Orders: PT Eval and Treat   Medical Diagnosis from Referral:  Balance problem   Fall (on) (from) unspecified stairs and steps, subsequent encounter   Fall against object   Evaluation Date: 11/22/2023  Authorization Period Expiration: 10/23/2024  Plan of Care Expiration: 2/22/2024  Progress Note Due: 2/4/2024  Visit # / Visits authorized: 5/20 (+1)   FOTO: 1/3      Precautions: Standard and Fall    Time In: 1000  Time Out: 1100  Total Billable Time: 60 minutes    SUBJECTIVE     Pt reports: that she did experience a loss of balance.  She reports that loss of balance was not related to dizziness.  Instead, the loss of balance was related to shuffling.    She was compliant with home exercise program.    Response to previous treatment: dizziness with open book exercise    Functional change: weakness in lower extremities     Pain: 0/10  Location:  no pain today    OBJECTIVE     Objective Measures updated at progress report unless specified.     TREATMENT     Alison received the treatments listed below:      therapeutic exercises to develop strength, endurance, ROM, flexibility, and core stabilization for 30 minutes including:      Intervention 2/5/2024   Parameters    Treadmill walking   x  2.1 mph x 10 minutes    nustep  6 minutes level 3    Bike   5 minutes    Standing strengthening exercises (add theraband as able) x  x  x    x Hip Flexion - 3 x 10  Hip Extension 3 x 10  Hip Abduction 3 x 10  Hip Adduction  heel raises 3 x 10   Shuttle   5 Bands, 10x/ 3 sets   Squat   - Picking up 6# medicine ball from the floor - 2x10   Heel raises (added)   x  3 x 10 2 hand  support on parallel bars          Leg press   seat 4, feet 2 65# 10x/5 sets (increase weight next visit)                neuromuscular re-education activities to improve: Balance, Coordination, Proprioception, and Posture for 30 minutes. The following activities were included:    Intervention 2/5/2024  Parameters   Standing hand touches - at parallel bars x  x        x    x     Tandem - 10 rep switches x 2 minutes  Single leg stance  - 10 rep switching x 2 minutes  Single leg stance - 3x30 with horizontal eye movement  Tandem stance with vertical eye movements 10x eyes, 1 x each leg  Tandem stance with horizontal head movements 5x each (dizzy after)  Small FELIX head nods, then head turns 5x each   Tandem stance with eye movement   X  X  X  X  X  X      X  X  X  x Smooth pursuits:  Horizontal eye - 60 Hz x 1m  Vertical eye - 60 Hz x 1m  Diagonal eye - 60 Hz x 1m  Horizontal eye - 80 Hz x 1m  Vertical eye - 80 Hz x 1m  Diagonal eye - 80 Hz x 1m    VOR  Vertical - 60 Hz x 1m  Horizontal - 60 Hz x 1m  Vertical - 80 Hz x 1m  Horizontal - 80 Hz x 1m   Tandem walking    X  X      X  x With smooth pursuits:  Horizontal eye - 60 Hz x 1m  Vertical eye - 60 Hz x 1m    VOR  Vertical - 60 Hz x 1m  Horizontal - 60 Hz x 1m     Marching with high knees with cervical stabilization x Laser remaining on object x 1m x 2   Open book   7x each side DC dizzy after   Clams (added)   3 x 10 yellow band bilateral    bridge  3 x 12 (increased)    Toe raises (added)   3 x 10 on airex pad 2 hand support on parallel bars    Sidelying hips series  Abduction x 10   Circles x 10 clockwise/counter clockwise   X 10 forward/backward bicycles (added)    High hurdles - Balance walking over hurdles - 4 passes             Assessed for BPPV  Lorraine Hallpike negative for nystagmus, did have symptoms of dizziness when moving from supine to tall sitting but no nystagmus          Alison participated in dynamic functional therapeutic activities to improve  functional performance for 16  minutes, including:    Intervention 2/5/2024  Parameters   Sit to stand from lower surfaces  2x10 - 16 inches        Agility/balance x  x     Sidesteps 3 laps in parallel bars  Carioca 5 laps in parallel bars  Large forward steps  Backward walking/ froward high march - no hands 3 laps   Step downs - 6 inch box - trying to use one hand x 10x/each side 6 inch step   Step ups  x 2x10 - 8 in step each leg - single hand support   Matrix - Knee Extension 15lbs plus one small weight - 2x15   Clinic stairs (added)   5 x alternating steps with each foot 2 rails                                  Plan for Next Visit: progress as able        PATIENT EDUCATION AND HOME EXERCISES     Home Exercises Provided and Patient Education Provided     Education provided:   - HOME EXERCISE PROGRAM  11/30/2023: added home exercise program to my chart and gave instructed how to access on my chart .    Written Home Exercises Provided: Patient instructed to cont prior HEP. Exercises were reviewed and Alison was able to demonstrate them prior to the end of the session.  Alison demonstrated good  understanding of the education provided. See EMR under Patient Instructions for exercises provided during therapy sessions      ASSESSMENT     Patient reports that she has not experienced dizziness.  However, she does report of shuffling occasionally with loss of balance.  The patient reports that she has not been consistent with exercises regularly.  Reminded patient of the importance of daily performance of exercises to help improve symptoms and restore greater balance.  Still some nystagmus with eye movements.  Worked significantly on cervical stabilization exercises for improved balance.    Alison Is progressing well towards her goals.   Pt prognosis is Good.     Pt will continue to benefit from skilled outpatient physical therapy to address the deficits listed in the problem list box on initial evaluation, provide  pt/family education and to maximize pt's level of independence in the home and community environment.     Pt's spiritual, cultural and educational needs considered and pt agreeable to plan of care and goals.     Anticipated barriers to physical therapy:  Anxiety or Panic Disorders, Arthritis, BMI over 30, Cancer, Depression, Incontinence, Previous accidents, Sleep dysfunction    Goals:   Reviewed: 2/5/2024    Short Term Goals: In 4 weeks   1.Patient to be educated on HEP. - met  2.Patient single leg stance balance times to 5 seconds or greater  PC  3.Patient to increase bilateral lower extremities strength to 4/5 for improved sit to stand from lower surfaces PC  4.Patient to have pain less than 2/10 at worst, to improve QOL. met  5.Patient to improve score on the FOTO, to improve QOL. PC  6. Patient to demonstrate improved squat to 75% PC - progressing     Long Term Goals: In 8 weeks  1.Patient to improve score on the FOTO to 57 or greater, to improve QOL. PC  2. Patient to increase bilateral lower extremities strength to 4/5 for improved sit to stand from lower surfaces PC  3. Patient to have decreased pain to 2/10 at worst, to improve QOL. met  4. Patient to demonstrate improved 5 second sit to stand score to 11 seconds PC  5. Patient single leg stance balance times to 5 seconds or greater  PC  6. Patient to perform on and off the ground without assistance. met  5. Patient to perform daily activities without increased symptoms including.  Walking on grass or uneven surfaces met  Prolonged walking without complaint of fatigue PC  Squat to ground without apprehension PC       PLAN     Monitor response to today's treatment session and progress with Physical Therapy plan of care as indicated.    Plan of care Certification: 11/22/2023 to 2/22/2024.     Outpatient Physical Therapy 2 times weekly for 10 weeks to include the following interventions: Gait Training, Manual Therapy, Neuromuscular Re-ed, Patient Education,  Self Care, Therapeutic Activities, and Therapeutic Exercise.     Connor Puckett, PT

## 2024-02-09 ENCOUNTER — CLINICAL SUPPORT (OUTPATIENT)
Dept: AUDIOLOGY | Facility: CLINIC | Age: 74
End: 2024-02-09
Payer: MEDICARE

## 2024-02-09 DIAGNOSIS — H93.13 TINNITUS OF BOTH EARS: ICD-10-CM

## 2024-02-09 DIAGNOSIS — R26.89 BALANCE PROBLEMS: ICD-10-CM

## 2024-02-09 DIAGNOSIS — H90.3 SENSORINEURAL HEARING LOSS, BILATERAL: Primary | ICD-10-CM

## 2024-02-09 DIAGNOSIS — R42 DIZZINESS: ICD-10-CM

## 2024-02-09 PROCEDURE — 92540 BASIC VESTIBULAR EVALUATION: CPT | Mod: 26,S$PBB,, | Performed by: AUDIOLOGIST-HEARING AID FITTER

## 2024-02-09 PROCEDURE — 99211 OFF/OP EST MAY X REQ PHY/QHP: CPT | Mod: PBBFAC,25 | Performed by: AUDIOLOGIST-HEARING AID FITTER

## 2024-02-09 PROCEDURE — 92557 COMPREHENSIVE HEARING TEST: CPT | Mod: PBBFAC | Performed by: AUDIOLOGIST-HEARING AID FITTER

## 2024-02-09 PROCEDURE — 92537 CALORIC VSTBLR TEST W/REC: CPT | Mod: PBBFAC | Performed by: AUDIOLOGIST-HEARING AID FITTER

## 2024-02-09 PROCEDURE — 99999 PR PBB SHADOW E&M-EST. PATIENT-LVL I: CPT | Mod: PBBFAC,,, | Performed by: AUDIOLOGIST-HEARING AID FITTER

## 2024-02-09 PROCEDURE — 92567 TYMPANOMETRY: CPT | Mod: PBBFAC | Performed by: AUDIOLOGIST-HEARING AID FITTER

## 2024-02-09 PROCEDURE — 92540 BASIC VESTIBULAR EVALUATION: CPT | Mod: PBBFAC | Performed by: AUDIOLOGIST-HEARING AID FITTER

## 2024-02-09 PROCEDURE — 92537 CALORIC VSTBLR TEST W/REC: CPT | Mod: 26,S$PBB,, | Performed by: AUDIOLOGIST-HEARING AID FITTER

## 2024-02-09 NOTE — PROGRESS NOTES
Referring provider: Ana Lilia Schulz PA-C    Alison Lobo was seen 02/09/2024 for an audiological and vestibular evaluation.  Patient complains of dizziness. She reports that the symptoms have been present for the last 2 months. She describes initially feeling clumsy and off balance. Triggered by with sudden changes in position or sudden head movements. Had nausea with these episodes as well. Now she feels brief spinning with the overall imbalance. On average, the patient reports symptoms that occur approximately few times each day. She describes the dizziness as a sensation of unsteadiness, a sensation of movement of surroundings, and imbalance and says that it lasts seconds. She has fallen a few times recently (tripped in garage, woozy while on steps). She has been working with balance therapy and she thinks it helps. She denies aural pressure, otalgia, otorrhea. She has some hearing loss with her last audiogram in 2020 in [place]. She does not appreciate a significant change since her last evaluation. She has had tinnitus AU, pulsatile at times for years. She previously followed with ENT in [place]; reports having three severe, debilitating episodes of vertigo at that time (> 3 years ago). States this dizziness is not as severe and feels slightly different. She has history of Chun's neuroma.     09/20/2020 [facility] [place] Audiogram:   Type As tymps AU. Audiometry revealed hearing within normal limits AD and normal sloping to mild SNHL at 4 kHz AS. Excellent WRS AU.     Audiology Report:  Results reveal a normal-to-moderate sensorineural hearing loss 125-8000 Hz for the right ear, and normal-to-moderate sensorineural hearing loss 125-8000 Hz for the left ear.   Speech Reception Thresholds were 20 dBHL for the right ear and 15 dBHL for the left ear.   Word recognition scores were excellent for the right ear and excellent for the left ear.   Tympanograms were Type As for the right ear and Type  As for the left ear.    Videonystagmography Report (VNG):  Oculomotor function tests (sinusoidal tracking, saccade, optokinetic) were normal and symmetric.  Gaze test was absent for nystagmus.  Spontaneous test was absent for nystagmus.  Head-shake test was absent for after head-shake nystagmus.  Static Positional test was absent for nystagmus.  Lorraine-Hallpike Right was negative for BPPV.  [place]-Hallpike Left was negative for BPPV.  Bi-thermal caloric test was Normal.  Fixation suppression following caloric irrigations was normal.  The following values were obtained:  Unilateral weakness (UW): 10% left ear  Directional preponderance (DP): 10% right beating  RC: 11 d/s    d/s  RW: 16 d/s  LW: 11 d/s     Summary: Normal VNG, consistent with normal central and peripheral vestibular function. Negative for BPPV.    Recommendations:  ENT  Continue with balance PT  Audiogram every two years to monitor hearing loss, or sooner if any perceived changes in hearing.     Patient was counseled on the above findings.  Tracings are to be scanned.    2020:

## 2024-02-09 NOTE — Clinical Note
For your review. Told pt you will f/u only if additional recommendations. Has neuropathy - discussed that and imbalance.

## 2024-02-12 ENCOUNTER — CLINICAL SUPPORT (OUTPATIENT)
Dept: REHABILITATION | Facility: HOSPITAL | Age: 74
End: 2024-02-12
Payer: MEDICARE

## 2024-02-12 DIAGNOSIS — R53.1 DECREASED STRENGTH, ENDURANCE, AND MOBILITY: ICD-10-CM
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
## 2024-02-12 DIAGNOSIS — R26.89 BALANCE PROBLEM: Primary | Chronic | ICD-10-CM

## 2024-02-12 DIAGNOSIS — R68.89 DECREASED STRENGTH, ENDURANCE, AND MOBILITY: ICD-10-CM

## 2024-02-12 DIAGNOSIS — Z74.09 DECREASED STRENGTH, ENDURANCE, AND MOBILITY: ICD-10-CM

## 2024-02-12 PROCEDURE — 97112 NEUROMUSCULAR REEDUCATION: CPT | Mod: CQ

## 2024-02-12 PROCEDURE — 97110 THERAPEUTIC EXERCISES: CPT | Mod: CQ

## 2024-02-12 NOTE — PROGRESS NOTES
[facility]   Physical Therapy Treatment Note + Progress Report       Name: Alison Lobo  Clinic Number: [id number]    Therapy Diagnosis:   Encounter Diagnoses   Name Primary?    Balance problem Yes    Fall against object     Decreased strength, endurance, and mobility      Physician: Roselia Gillette MD    Visit Date: 2/12/2024    Physician Orders: PT Eval and Treat   Medical Diagnosis from Referral:  Balance problem   Fall (on) (from) unspecified stairs and steps, subsequent encounter   Fall against object   Evaluation Date: 11/22/2023  Authorization Period Expiration: 10/23/2024  Plan of Care Expiration: 2/22/2024  Progress Note Due: 2/22/2024  Visit # / Visits authorized: 6/20 (+1)   FOTO: 1/3      Precautions: Standard and Fall    Time In: 0930  Time Out: 1030  Total Billable Time: 54 minutes    SUBJECTIVE     Pt reports:  Shuffling while walking especially through unfamiliar surfaces. Has been busy with meeting her schedule. She is still participating with chair yoga and will start another exercise program at the [facility].     She was not compliant with home exercise program.    Response to previous treatment: dizziness with open book exercise    Functional change: weakness in lower extremities, decreased confidence with walking through unfamiliar thresholds, she does report that her balance has improved. Will start in near future an exercise program at the [facility].    Pain: 0/10  Location:  no pain today    OBJECTIVE     Objective Measures updated at progress report unless specified.       Single leg stance: R = 10 seconds, L = 11 seconds         TREATMENT     Alison received the treatments listed below:      therapeutic exercises to develop strength, endurance, ROM, flexibility, and core stabilization for 39 minutes including: obtaining objective measurements and completing FOTO      Intervention 2/12/2024   Parameters    Treadmill walking   x  2.1 mph x 10 minutes    nustep  6  minutes level 3    Bike   5 minutes    Standing strengthening exercises (add airex pad) x  x  x    x Hip Flexion - 3 x 10  Hip Extension 3 x 10  Hip Abduction 3 x 10  Hip Adduction  heel raises 3 x 10   Shuttle   5 Bands, 10x/ 3 sets   Squat   - Picking up 6# medicine ball from the floor - 2x10   Heel raises   3 x 10 2 hand support on parallel bars          Leg press   seat 4, feet 2 65# 10x/5 sets (increase weight next visit)                neuromuscular re-education activities to improve: Balance, Coordination, Proprioception, and Posture for 9 minutes. The following activities were included:    Intervention 2/12/2024  Parameters   Standing hand touches - at parallel bars x  x        x         Tandem - 10 rep switches x 2 minutes  Single leg stance  - 10 rep switching x 2 minutes  Single leg stance - 3 x 30 with horizontal eye movement  Tandem stance with vertical eye movements 1 minutes   Tandem stance with horizontal head movements 5 x each (dizzy after)  Small FELIX head nods, then head turns 5 x each   Tandem stance with eye movement                        Smooth pursuits:  Horizontal eye - 60 Hz x 1m  Vertical eye - 60 Hz x 1m  Diagonal eye - 60 Hz x 1m  Horizontal eye - 80 Hz x 1m  Vertical eye - 80 Hz x 1m  Diagonal eye - 80 Hz x 1m    VOR  Vertical - 60 Hz x 1m  Horizontal - 60 Hz x 1m  Vertical - 80 Hz x 1m  Horizontal - 80 Hz x 1m   Tandem walking               With smooth pursuits:  Horizontal eye - 60 Hz x 1m  Vertical eye - 60 Hz x 1m    VOR  Vertical - 60 Hz x 1m  Horizontal - 60 Hz x 1m     Marching with high knees with cervical stabilization  Laser remaining on object x 1m x 2   Open book   7x each side DC dizzy after   Clams (added)   3 x 10 yellow band bilateral    bridge  3 x 12 (increased)    Toe raises (added)   3 x 10 on airex pad 2 hand support on parallel bars    Sidelying hips series  Abduction x 10   Circles x 10 clockwise/counter clockwise   X 10 forward/backward bicycles (added)    High  hurdles - Balance walking over hurdles - 4 passes             Assessed for BPPV  Lorraine Hallpike negative for nystagmus, did have symptoms of dizziness when moving from supine to tall sitting but no nystagmus          Alison participated in dynamic functional therapeutic activities to improve functional performance for 6  minutes, including:    Intervention 2/12/2024  Parameters   Sit to stand from lower surfaces  2x10 - 16 inches        Agility/balance x  x     Sidesteps 3 laps in parallel bars  Carioca 5 laps in parallel bars  Large forward steps  Backward walking/ froward high march - no hands 3 laps   Step downs - 6 inch box - trying to use one hand  10x/each side 6 inch step   Step ups   2x10 - 8 in step each leg - single hand support   Matrix - Knee Extension 15lbs plus one small weight - 2x15   Clinic stairs (added)   5 x alternating steps with each foot 2 rails                                  Plan for Next Visit: progress as able        PATIENT EDUCATION AND HOME EXERCISES     Home Exercises Provided and Patient Education Provided     Education provided:   - HOME EXERCISE PROGRAM  11/30/2023: added home exercise program to my chart and gave instructed how to access on my chart .    Written Home Exercises Provided: Patient instructed to cont prior HEP. Exercises were reviewed and Alison was able to demonstrate them prior to the end of the session.  Alison demonstrated good  understanding of the education provided. See EMR under Patient Instructions for exercises provided during therapy sessions      ASSESSMENT     Ms. Rosas reports she is stronger but that she also has difficulty with not shuffling while she is walking and that she frequently scuffs her feet on unfamiliar door fany. She is able to demonstrate an improvement in her unilateral standing tolerance bilaterally. Her FOTO also has improved. All objective measurements are compared to 1/4/2024. She has not been consistent with performing her  home exercise program regularly due to her busy personal schedule but will initiate a supervised exercise regimen in the near future. She may benefit from continuing physical therapy to address her remaining deficits.     Alison Is progressing well towards her goals.   Pt prognosis is Good.     Pt will continue to benefit from skilled outpatient physical therapy to address the deficits listed in the problem list box on initial evaluation, provide pt/family education and to maximize pt's level of independence in the home and community environment.     Pt's spiritual, cultural and educational needs considered and pt agreeable to plan of care and goals.     Anticipated barriers to physical therapy:  Anxiety or Panic Disorders, Arthritis, BMI over 30, Cancer, Depression, Incontinence, Previous accidents, Sleep dysfunction    Goals:   Reviewed: 2/12/2024    Short Term Goals: In 4 weeks   1.Patient to be educated on HEP. - met  2.Patient single leg stance balance times to 5 seconds or greater  MET [id number]  3.Patient to increase bilateral lower extremities strength to 4/5 for improved sit to stand from lower surfaces PC  4.Patient to have pain less than 2/10 at worst, to improve QOL. met  5.Patient to improve score on the FOTO, to improve QOL. MET [id number]  6. Patient to demonstrate improved squat to 75% PC - progressing     Long Term Goals: In 8 weeks  1.Patient to improve score on the FOTO to 57 or greater, to improve QOL. MET [id number]  2. Patient to increase bilateral lower extremities strength to 4/5 for improved sit to stand from lower surfaces PC  3. Patient to have decreased pain to 2/10 at worst, to improve QOL. met  4. Patient to demonstrate improved 5 second sit to stand score to 11 seconds PC  5. Patient single leg stance balance times to 5 seconds or greater  MET [id number]  6. Patient to perform on and off the ground without assistance. met  5. Patient to perform daily activities without increased symptoms  including.  Walking on grass or uneven surfaces met  Prolonged walking without complaint of fatigue PC  Squat to ground without apprehension PC       PLAN     Monitor response to today's treatment session and progress with Physical Therapy plan of care as indicated.    Plan of care Certification: 11/22/2023 to 2/22/2024.     Outpatient Physical Therapy 2 times weekly for 10 weeks to include the following interventions: Gait Training, Manual Therapy, Neuromuscular Re-ed, Patient Education, Self Care, Therapeutic Activities, and Therapeutic Exercise.     Ishan Eduardo, PTA

## 2024-02-13 NOTE — PLAN OF CARE
[facility]   Physical Therapy Treatment Note + Progress Report       Name: Alison Lobo  Clinic Number: [id number]    Therapy Diagnosis:   Encounter Diagnoses   Name Primary?    Balance problem Yes    Fall against object     Decreased strength, endurance, and mobility      Physician: Roselia Gillette MD    Visit Date: 2/12/2024    Physician Orders: PT Eval and Treat   Medical Diagnosis from Referral:  Balance problem   Fall (on) (from) unspecified stairs and steps, subsequent encounter   Fall against object   Evaluation Date: 11/22/2023  Authorization Period Expiration: 10/23/2024  Plan of Care Expiration: 2/22/2024  Progress Note Due: 2/22/2024  Visit # / Visits authorized: 6/20 (+1)   FOTO: 1/3      Precautions: Standard and Fall    Time In: 0930  Time Out: 1030  Total Billable Time: 54 minutes    SUBJECTIVE     Pt reports:  Shuffling while walking especially through unfamiliar surfaces. Has been busy with meeting her schedule. She is still participating with chair yoga and will start another exercise program at the [facility].     She was not compliant with home exercise program.    Response to previous treatment: dizziness with open book exercise    Functional change: weakness in lower extremities, decreased confidence with walking through unfamiliar thresholds, she does report that her balance has improved. Will start in near future an exercise program at the [facility].    Pain: 0/10  Location:  no pain today    OBJECTIVE     Objective Measures updated at progress report unless specified.       Single leg stance: R = 10 seconds, L = 11 seconds         TREATMENT     Alison received the treatments listed below:      therapeutic exercises to develop strength, endurance, ROM, flexibility, and core stabilization for 39 minutes including: obtaining objective measurements and completing FOTO      Intervention 2/12/2024   Parameters    Treadmill walking   x  2.1 mph x 10 minutes    nustep  6  minutes level 3    Bike   5 minutes    Standing strengthening exercises (add airex pad) x  x  x    x Hip Flexion - 3 x 10  Hip Extension 3 x 10  Hip Abduction 3 x 10  Hip Adduction  heel raises 3 x 10   Shuttle   5 Bands, 10x/ 3 sets   Squat   - Picking up 6# medicine ball from the floor - 2x10   Heel raises   3 x 10 2 hand support on parallel bars          Leg press   seat 4, feet 2 65# 10x/5 sets (increase weight next visit)                neuromuscular re-education activities to improve: Balance, Coordination, Proprioception, and Posture for 9 minutes. The following activities were included:    Intervention 2/12/2024  Parameters   Standing hand touches - at parallel bars x  x        x         Tandem - 10 rep switches x 2 minutes  Single leg stance  - 10 rep switching x 2 minutes  Single leg stance - 3 x 30 with horizontal eye movement  Tandem stance with vertical eye movements 1 minutes   Tandem stance with horizontal head movements 5 x each (dizzy after)  Small FELIX head nods, then head turns 5 x each   Tandem stance with eye movement                        Smooth pursuits:  Horizontal eye - 60 Hz x 1m  Vertical eye - 60 Hz x 1m  Diagonal eye - 60 Hz x 1m  Horizontal eye - 80 Hz x 1m  Vertical eye - 80 Hz x 1m  Diagonal eye - 80 Hz x 1m    VOR  Vertical - 60 Hz x 1m  Horizontal - 60 Hz x 1m  Vertical - 80 Hz x 1m  Horizontal - 80 Hz x 1m   Tandem walking               With smooth pursuits:  Horizontal eye - 60 Hz x 1m  Vertical eye - 60 Hz x 1m    VOR  Vertical - 60 Hz x 1m  Horizontal - 60 Hz x 1m     Marching with high knees with cervical stabilization  Laser remaining on object x 1m x 2   Open book   7x each side DC dizzy after   Clams (added)   3 x 10 yellow band bilateral    bridge  3 x 12 (increased)    Toe raises (added)   3 x 10 on airex pad 2 hand support on parallel bars    Sidelying hips series  Abduction x 10   Circles x 10 clockwise/counter clockwise   X 10 forward/backward bicycles (added)    High  hurdles - Balance walking over hurdles - 4 passes             Assessed for BPPV  Lorraine Hallpike negative for nystagmus, did have symptoms of dizziness when moving from supine to tall sitting but no nystagmus          Alison participated in dynamic functional therapeutic activities to improve functional performance for 6  minutes, including:    Intervention 2/12/2024  Parameters   Sit to stand from lower surfaces  2x10 - 16 inches        Agility/balance x  x     Sidesteps 3 laps in parallel bars  Carioca 5 laps in parallel bars  Large forward steps  Backward walking/ froward high march - no hands 3 laps   Step downs - 6 inch box - trying to use one hand  10x/each side 6 inch step   Step ups   2x10 - 8 in step each leg - single hand support   Matrix - Knee Extension 15lbs plus one small weight - 2x15   Clinic stairs (added)   5 x alternating steps with each foot 2 rails                                  Plan for Next Visit: progress as able        PATIENT EDUCATION AND HOME EXERCISES     Home Exercises Provided and Patient Education Provided     Education provided:   - HOME EXERCISE PROGRAM  11/30/2023: added home exercise program to my chart and gave instructed how to access on my chart .    Written Home Exercises Provided: Patient instructed to cont prior HEP. Exercises were reviewed and Alison was able to demonstrate them prior to the end of the session.  Alison demonstrated good  understanding of the education provided. See EMR under Patient Instructions for exercises provided during therapy sessions      ASSESSMENT     Ms. Rosas reports she is stronger but that she also has difficulty with not shuffling while she is walking and that she frequently scuffs her feet on unfamiliar door fany. She is able to demonstrate an improvement in her unilateral standing tolerance bilaterally. Her FOTO also has improved. All objective measurements are compared to 1/4/2024. She has not been consistent with performing her  home exercise program regularly due to her busy personal schedule but will initiate a supervised exercise regimen in the near future. She may benefit from continuing physical therapy to address her remaining deficits.     Alison Is progressing well towards her goals.   Pt prognosis is Good.     Pt will continue to benefit from skilled outpatient physical therapy to address the deficits listed in the problem list box on initial evaluation, provide pt/family education and to maximize pt's level of independence in the home and community environment.     Pt's spiritual, cultural and educational needs considered and pt agreeable to plan of care and goals.     Anticipated barriers to physical therapy:  Anxiety or Panic Disorders, Arthritis, BMI over 30, Cancer, Depression, Incontinence, Previous accidents, Sleep dysfunction    Goals:   Reviewed: 2/12/2024    Short Term Goals: In 4 weeks   1.Patient to be educated on HEP. - met  2.Patient single leg stance balance times to 5 seconds or greater  MET [id number]  3.Patient to increase bilateral lower extremities strength to 4/5 for improved sit to stand from lower surfaces PC  4.Patient to have pain less than 2/10 at worst, to improve QOL. met  5.Patient to improve score on the FOTO, to improve QOL. MET [id number]  6. Patient to demonstrate improved squat to 75% PC - progressing     Long Term Goals: In 8 weeks  1.Patient to improve score on the FOTO to 57 or greater, to improve QOL. MET [id number]  2. Patient to increase bilateral lower extremities strength to 4/5 for improved sit to stand from lower surfaces PC  3. Patient to have decreased pain to 2/10 at worst, to improve QOL. met  4. Patient to demonstrate improved 5 second sit to stand score to 11 seconds PC  5. Patient single leg stance balance times to 5 seconds or greater  MET [id number]  6. Patient to perform on and off the ground without assistance. met  5. Patient to perform daily activities without increased symptoms  including.  Walking on grass or uneven surfaces met  Prolonged walking without complaint of fatigue PC  Squat to ground without apprehension PC       PLAN     Monitor response to today's treatment session and progress with Physical Therapy plan of care as indicated.    Plan of care Certification: 11/22/2023 to 2/22/2024.     Outpatient Physical Therapy 2 times weekly for 10 weeks to include the following interventions: Gait Training, Manual Therapy, Neuromuscular Re-ed, Patient Education, Self Care, Therapeutic Activities, and Therapeutic Exercise.     Ishan Eduardo, PTA

## 2024-02-15 ENCOUNTER — CLINICAL SUPPORT (OUTPATIENT)
Dept: REHABILITATION | Facility: HOSPITAL | Age: 74
End: 2024-02-15
Payer: MEDICARE

## 2024-02-15 DIAGNOSIS — R68.89 DECREASED STRENGTH, ENDURANCE, AND MOBILITY: ICD-10-CM

## 2024-02-15 DIAGNOSIS — R26.89 BALANCE PROBLEM: Primary | Chronic | ICD-10-CM

## 2024-02-15 DIAGNOSIS — Z74.09 DECREASED STRENGTH, ENDURANCE, AND MOBILITY: ICD-10-CM

## 2024-02-15 DIAGNOSIS — R53.1 DECREASED STRENGTH, ENDURANCE, AND MOBILITY: ICD-10-CM
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
## 2024-02-15 PROCEDURE — 97112 NEUROMUSCULAR REEDUCATION: CPT

## 2024-02-15 PROCEDURE — 97530 THERAPEUTIC ACTIVITIES: CPT

## 2024-02-15 PROCEDURE — 97110 THERAPEUTIC EXERCISES: CPT

## 2024-02-15 NOTE — PROGRESS NOTES
[facility]   Physical Therapy Treatment Note + Progress Report       Name: Alison Lobo  Clinic Number: [id number]    Therapy Diagnosis:   Encounter Diagnoses   Name Primary?    Balance problem Yes    Fall against object     Decreased strength, endurance, and mobility        Physician: Roselia Gillette MD    Visit Date: 2/15/2024    Physician Orders: PT Eval and Treat   Medical Diagnosis from Referral:  Balance problem   Fall (on) (from) unspecified stairs and steps, subsequent encounter   Fall against object   Evaluation Date: 11/22/2023  Authorization Period Expiration: 10/23/2024  Plan of Care Expiration: 2/22/2024  Progress Note Due: 2/22/2024  Visit # / Visits authorized: 7/20 (+6)   FOTO: 1/3      Precautions: Standard and Fall    Time In: 1:25 pm  Time Out: 2:25 pm  Total Billable Time: 55 minutes    SUBJECTIVE     Pt reports:  Patient reports that she has has been doing chair yoga and workout classes at [facility] - she reports that she has been going 3x per week. She reports several episodes of dizziness during treatment but was able to rest for a moment and proceed with treatment session.    She was not compliant with home exercise program.    Response to previous treatment: dizziness with open book exercise    Functional change: weakness in lower extremities, decreased confidence with walking through unfamiliar thresholds, she does report that her balance has improved.     Pain: 0/10  Location:  no pain today    OBJECTIVE     Objective Measures updated at progress report unless specified.     TREATMENT     Alison received the treatments listed below:      therapeutic exercises to develop strength, endurance, ROM, flexibility, and core stabilization for 28 minutes including:       Intervention 2/15/2024   Parameters    Treadmill walking   x  2.5 mph x 10 minutes    nustep  6 minutes level 3    Bike   5 minutes    Standing strengthening exercises on airex pad x  x  x    x Hip  Flexion - 3 x 10  Hip Extension 3 x 10  Hip Abduction 3 x 10  Hip Adduction  heel raises 3 x 10   Shuttle   5 Bands, 10x/ 3 sets   Squat   x Picking up 6# medicine ball from the floor - 2x10   Heel raises   3 x 10 2 hand support on parallel bars          Leg press   seat 4, feet 2 75# 10x/3 sets                neuromuscular re-education activities to improve: Balance, Coordination, Proprioception, and Posture for 15 minutes. The following activities were included:    Intervention 2/15/2024  Parameters   Standing hand touches - at parallel bars x  x  x    x    x    x Tandem - each leg - 2 minutes  Single leg stance  - each  leg x 2 minutes  Single leg stance - 3 x 30 with horizontal eye movement  Tandem stance with vertical eye movements 1 minutes   Tandem stance with horizontal head movements 5 x each (dizzy after)  Small FELIX head nods, then head turns 5 x each   Tandem stance with eye movement                        Smooth pursuits:  Horizontal eye - 60 Hz x 1m  Vertical eye - 60 Hz x 1m  Diagonal eye - 60 Hz x 1m  Horizontal eye - 80 Hz x 1m  Vertical eye - 80 Hz x 1m  Diagonal eye - 80 Hz x 1m    VOR  Vertical - 60 Hz x 1m  Horizontal - 60 Hz x 1m  Vertical - 80 Hz x 1m  Horizontal - 80 Hz x 1m   Tandem walking               With smooth pursuits:  Horizontal eye - 60 Hz x 1m  Vertical eye - 60 Hz x 1m    VOR  Vertical - 60 Hz x 1m  Horizontal - 60 Hz x 1m     Marching with high knees with cervical stabilization  Laser remaining on object x 1m x 2   Open book   7x each side DC dizzy after   Clams (added)   3 x 10 yellow band bilateral    bridge  3 x 12 (increased)    Toe raises (added)   3 x 10 on airex pad 2 hand support on parallel bars    Sidelying hips series  Abduction x 10   Circles x 10 clockwise/counter clockwise   X 10 forward/backward bicycles (added)    High hurdles - Balance walking over hurdles - 4 passes             Assessed for BPPV  Lorraine Lindsay negative for nystagmus, did have symptoms of  dizziness when moving from supine to tall sitting but no nystagmus          Alison participated in dynamic functional therapeutic activities to improve functional performance for 12  minutes, including:    Intervention 2/15/2024  Parameters   Sit to stand from lower surfaces  2x10 - 16 inches        Agility/balance x  x    x Sidesteps 3 laps in grass with SB A +1  Carioca 5 laps in parallel bars  Large forward steps  Backward walking 3 lap/ (no forward high march) HHA +1 in grass    Step downs - 6 inch box - trying to use one hand  10x/each side 6 inch step   Step ups   2x10 - 8 in step each leg - single hand support   Matrix  Knee Extension 15lbs plus one small weight - 2x15   Clinic stairs (added)   5 x alternating steps with each foot 2 rails                                  Plan for Next Visit: progress as able        PATIENT EDUCATION AND HOME EXERCISES     Home Exercises Provided and Patient Education Provided     Education provided:   -continue with home exercise program       Written Home Exercises Provided: Patient instructed to cont prior HEP. Exercises were reviewed and Alison was able to demonstrate them prior to the end of the session.  Alison demonstrated good  understanding of the education provided. See EMR under Patient Instructions for exercises provided during therapy sessions      ASSESSMENT     Patient tolerated treatment well and was noted to have improved balance during today's treatment session. She did have a few episodes of dizziness but was able to continue with treatment after a short break. She was able to progress with activities as noted. Encouraged home exercise program and head movements/balance activities. Cues as needed for correction of technique during today's treatment session.    Alison Is progressing well towards her goals.   Pt prognosis is Good.     Pt will continue to benefit from skilled outpatient physical therapy to address the deficits listed in the problem list  box on initial evaluation, provide pt/family education and to maximize pt's level of independence in the home and community environment.     Pt's spiritual, cultural and educational needs considered and pt agreeable to plan of care and goals.     Anticipated barriers to physical therapy:  Anxiety or Panic Disorders, Arthritis, BMI over 30, Cancer, Depression, Incontinence, Previous accidents, Sleep dysfunction    Goals:   Reviewed: 2/15/2024    Short Term Goals: In 4 weeks   1.Patient to be educated on HEP. - met  2.Patient single leg stance balance times to 5 seconds or greater  MET [id number]  3.Patient to increase bilateral lower extremities strength to 4/5 for improved sit to stand from lower surfaces PC  4.Patient to have pain less than 2/10 at worst, to improve QOL. met  5.Patient to improve score on the FOTO, to improve QOL. MET [id number]  6. Patient to demonstrate improved squat to 75% PC - progressing     Long Term Goals: In 8 weeks  1.Patient to improve score on the FOTO to 57 or greater, to improve QOL. MET [id number]  2. Patient to increase bilateral lower extremities strength to 4/5 for improved sit to stand from lower surfaces PC  3. Patient to have decreased pain to 2/10 at worst, to improve QOL. met  4. Patient to demonstrate improved 5 second sit to stand score to 11 seconds PC  5. Patient single leg stance balance times to 5 seconds or greater  MET [id number]  6. Patient to perform on and off the ground without assistance. met  5. Patient to perform daily activities without increased symptoms including.  Walking on grass or uneven surfaces met  Prolonged walking without complaint of fatigue PC  Squat to ground without apprehension PC       PLAN     Monitor response to today's treatment session and progress with Physical Therapy plan of care as indicated.    Plan of care Certification: 11/22/2023 to 2/22/2024.     Outpatient Physical Therapy 2 times weekly for 10 weeks to include the following interventions:  Gait Training, Manual Therapy, Neuromuscular Re-ed, Patient Education, Self Care, Therapeutic Activities, and Therapeutic Exercise.     Summer Garcia, PT

## 2024-02-19 ENCOUNTER — CLINICAL SUPPORT (OUTPATIENT)
Dept: REHABILITATION | Facility: HOSPITAL | Age: 74
End: 2024-02-19
Payer: MEDICARE

## 2024-02-19 DIAGNOSIS — R26.89 BALANCE PROBLEM: Primary | Chronic | ICD-10-CM
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
## 2024-02-19 DIAGNOSIS — Z74.09 DECREASED STRENGTH, ENDURANCE, AND MOBILITY: ICD-10-CM

## 2024-02-19 DIAGNOSIS — R68.89 DECREASED STRENGTH, ENDURANCE, AND MOBILITY: ICD-10-CM

## 2024-02-19 DIAGNOSIS — R53.1 DECREASED STRENGTH, ENDURANCE, AND MOBILITY: ICD-10-CM

## 2024-02-19 PROCEDURE — 97112 NEUROMUSCULAR REEDUCATION: CPT

## 2024-02-19 PROCEDURE — 97110 THERAPEUTIC EXERCISES: CPT

## 2024-02-19 NOTE — PROGRESS NOTES
[facility]   Physical Therapy Treatment Note + Progress Report       Name: Alison Lobo  Clinic Number: [id number]    Therapy Diagnosis:   Encounter Diagnoses   Name Primary?    Balance problem Yes    Fall against object     Decreased strength, endurance, and mobility        Physician: Roselia Gillette MD    Visit Date: 2024    Physician Orders: PT Eval and Treat   Medical Diagnosis from Referral:  Balance problem   Fall (on) (from) unspecified stairs and steps, subsequent encounter   Fall against object   Evaluation Date: 2023  Authorization Period Expiration: 10/23/2024  Plan of Care Expiration: 2024  Progress Note Due: 2024  Visit # / Visits authorized:  (+6)   FOTO: 1/3      Precautions: Standard and Fall    Time In: 1000  Time Out: 1100  Total Billable Time: 60 minutes    SUBJECTIVE     Pt reports:  Patient reports that she has has been doing chair yoga and workout classes at [facility] - she reports that she has been going 3x per week. She reports several episodes of dizziness during treatment but was able to rest for a moment and proceed with treatment session.    She was not compliant with home exercise program.    Response to previous treatment: dizziness with open book exercise    Functional change: weakness in lower extremities, decreased confidence with walking through unfamiliar thresholds, she does report that her balance has improved.     Pain: 0/10  Location:  no pain today    OBJECTIVE     Objective Measures updated at progress report unless specified.     Treadmill walkin.7 mph x 12    Tandem Stance Eyes open - 10 seconds bilaterally, Eyes Closed - 3 seconds  Single leg stance: Eyes open R = 10s, L = 7 seconds. Eyse closed 2 seconds  Foam standing - bilaterally eyes open = 10 seconds, eyes closed = 10 seconds                             Tandem stance eyes open = 7 seconds, eyes closed = 1 second    TREATMENT     Alison received the  treatments listed below:      therapeutic exercises to develop strength, endurance, ROM, flexibility, and core stabilization for 30 minutes including:       Intervention 2/19/2024   Parameters    Treadmill walking   x  2.5 mph x 10 minutes    nustep  6 minutes level 3    Bike   5 minutes    Standing strengthening exercises on airex pad x  x  x    x Hip Flexion - 3 x 10  Hip Extension 3 x 10  Hip Abduction 3 x 10  Hip Adduction  heel raises 3 x 10   Shuttle   5 Bands, 10x/ 3 sets   Squat   x Picking up 6# medicine ball from the floor - 2x10   Heel raises   3 x 10 2 hand support on parallel bars          Leg press   seat 4, feet 2 75# 10x/3 sets                neuromuscular re-education activities to improve: Balance, Coordination, Proprioception, and Posture for 30 minutes. The following activities were included:    Intervention 2/19/2024  Parameters   Standing hand touches - at parallel bars x  x  x    x    x    x Tandem - each leg - 2 minutes  Single leg stance  - each  leg x 2 minutes  Single leg stance - 3 x 30 with horizontal eye movement  Tandem stance with vertical eye movements 1 minutes   Tandem stance with horizontal head movements 5 x each (dizzy after)  Small FELIX head nods, then head turns 5 x each   Tandem stance with eye movement                        Smooth pursuits:  Horizontal eye - 60 Hz x 1m  Vertical eye - 60 Hz x 1m  Diagonal eye - 60 Hz x 1m  Horizontal eye - 80 Hz x 1m  Vertical eye - 80 Hz x 1m  Diagonal eye - 80 Hz x 1m    VOR  Vertical - 60 Hz x 1m  Horizontal - 60 Hz x 1m  Vertical - 80 Hz x 1m  Horizontal - 80 Hz x 1m   Tandem walking               With smooth pursuits:  Horizontal eye - 60 Hz x 1m  Vertical eye - 60 Hz x 1m    VOR  Vertical - 60 Hz x 1m  Horizontal - 60 Hz x 1m     Marching with high knees with cervical stabilization  Laser remaining on object x 1m x 2   Open book   7x each side DC dizzy after   Clams (added)   3 x 10 yellow band bilateral    bridge  3 x 12 (increased)     Toe raises (added)   3 x 10 on airex pad 2 hand support on parallel bars    Sidelying hips series  Abduction x 10   Circles x 10 clockwise/counter clockwise   X 10 forward/backward bicycles (added)    High hurdles - Balance walking over hurdles - 4 passes             Assessed for BPPV  Stella Hallpike negative for nystagmus, did have symptoms of dizziness when moving from supine to tall sitting but no nystagmus          Alison participated in dynamic functional therapeutic activities to improve functional performance for 12  minutes, including:    Intervention 2/19/2024  Parameters   Sit to stand from lower surfaces  2x10 - 16 inches        Agility/balance x      x Sidesteps 3 laps in grass with SB A +1  Carioca 5 laps in parallel bars  Large forward steps  Backward walking 3 lap/ (no forward high march) HHA +1 in grass    Step downs - 6 inch box - trying to use one hand  10x/each side 6 inch step   Step ups   2x10 - 8 in step each leg - single hand support   Matrix  Knee Extension 15lbs plus one small weight - 2x15   Clinic stairs (added)   5 x alternating steps with each foot 2 rails                                  Plan for Next Visit: progress as able        PATIENT EDUCATION AND HOME EXERCISES     Home Exercises Provided and Patient Education Provided     Education provided:   -continue with home exercise program       Written Home Exercises Provided: Patient instructed to cont prior HEP. Exercises were reviewed and Alison was able to demonstrate them prior to the end of the session.  Alison demonstrated good  understanding of the education provided. See EMR under Patient Instructions for exercises provided during therapy sessions      ASSESSMENT     Patient reports that she does still experience balance deficits.  However, she does report that symptoms do feel improved.  There is better performance of tandem stance and single leg stance activities.  However, there is still a significant balance deficit  with eyes closed.  There is a heavy reliance on vision for balance.  Have issued home exercises.  Nearing completion of PT services.  The patient does report that she is being consistent with [facility] exercise regimen.    Alison Is progressing well towards her goals.   Pt prognosis is Good.     Pt will continue to benefit from skilled outpatient physical therapy to address the deficits listed in the problem list box on initial evaluation, provide pt/family education and to maximize pt's level of independence in the home and community environment.     Pt's spiritual, cultural and educational needs considered and pt agreeable to plan of care and goals.     Anticipated barriers to physical therapy:  Anxiety or Panic Disorders, Arthritis, BMI over 30, Cancer, Depression, Incontinence, Previous accidents, Sleep dysfunction    Goals:   Reviewed: 2/19/2024    Short Term Goals: In 4 weeks   1.Patient to be educated on HEP. - met  2.Patient single leg stance balance times to 5 seconds or greater  MET [id number]  3.Patient to increase bilateral lower extremities strength to 4/5 for improved sit to stand from lower surfaces PC  4.Patient to have pain less than 2/10 at worst, to improve QOL. met  5.Patient to improve score on the FOTO, to improve QOL. MET [id number]  6. Patient to demonstrate improved squat to 75% PC - progressing     Long Term Goals: In 8 weeks  1.Patient to improve score on the FOTO to 57 or greater, to improve QOL. MET [id number]  2. Patient to increase bilateral lower extremities strength to 4/5 for improved sit to stand from lower surfaces PC  3. Patient to have decreased pain to 2/10 at worst, to improve QOL. met  4. Patient to demonstrate improved 5 second sit to stand score to 11 seconds PC  5. Patient single leg stance balance times to 5 seconds or greater  MET [id number]  6. Patient to perform on and off the ground without assistance. met  5. Patient to perform daily activities without increased symptoms  including.  Walking on grass or uneven surfaces met  Prolonged walking without complaint of fatigue PC  Squat to ground without apprehension PC       PLAN     Monitor response to today's treatment session and progress with Physical Therapy plan of care as indicated.    Plan of care Certification: 11/22/2023 to 2/22/2024.     Outpatient Physical Therapy 2 times weekly for 10 weeks to include the following interventions: Gait Training, Manual Therapy, Neuromuscular Re-ed, Patient Education, Self Care, Therapeutic Activities, and Therapeutic Exercise.     Connor Puckett, PT      patient a/o x 4 with a calm affect c/o intermittent bright red rectal bleed for a few months becoming more of a steady dripping today.  patient has added that she is on Xarelto and since arriving to ED, the bleeding has gotten better.  patient also has bruising to right side of face and right sided rib pain from a fall a few days prior

## 2024-02-20 NOTE — PLAN OF CARE
[facility]   Physical Therapy Treatment Note + Progress Report       Name: Alison Lobo  Clinic Number: [id number]    Therapy Diagnosis:   Encounter Diagnoses   Name Primary?    Balance problem Yes    Fall against object     Decreased strength, endurance, and mobility        Physician: Roselia Gillette MD    Visit Date: 2024    Physician Orders: PT Eval and Treat   Medical Diagnosis from Referral:  Balance problem   Fall (on) (from) unspecified stairs and steps, subsequent encounter   Fall against object   Evaluation Date: 2023  Authorization Period Expiration: 10/23/2024  Plan of Care Expiration: 2024  Progress Note Due: 2024  Visit # / Visits authorized:  (+6)   FOTO: 1/3      Precautions: Standard and Fall    Time In: 1:25 pm  Time Out: 2:25 pm  Total Billable Time: 55 minutes    SUBJECTIVE     Pt reports:  Patient reports that she has has been doing chair yoga and workout classes at [facility] - she reports that she has been going 3x per week. She reports several episodes of dizziness during treatment but was able to rest for a moment and proceed with treatment session.    She was not compliant with home exercise program.    Response to previous treatment: dizziness with open book exercise    Functional change: weakness in lower extremities, decreased confidence with walking through unfamiliar thresholds, she does report that her balance has improved.     Pain: 0/10  Location:  no pain today    OBJECTIVE     Objective Measures updated at progress report unless specified.     Treadmill walkin.7 mph x 12    Tandem Stance Eyes open - 10 seconds bilaterally, Eyes Closed - 3 seconds  Single leg stance: Eyes open R = 10s, L = 7 seconds. Eyse closed 2 seconds  Foam standing - bilaterally eyes open = 10 seconds, eyes closed = 10 seconds                             Tandem stance eyes open = 7 seconds, eyes closed = 1 second    TREATMENT     Alison received the  treatments listed below:      therapeutic exercises to develop strength, endurance, ROM, flexibility, and core stabilization for 28 minutes including:       Intervention 2/19/2024   Parameters    Treadmill walking   x  2.5 mph x 10 minutes    nustep  6 minutes level 3    Bike   5 minutes    Standing strengthening exercises on airex pad x  x  x    x Hip Flexion - 3 x 10  Hip Extension 3 x 10  Hip Abduction 3 x 10  Hip Adduction  heel raises 3 x 10   Shuttle   5 Bands, 10x/ 3 sets   Squat   x Picking up 6# medicine ball from the floor - 2x10   Heel raises   3 x 10 2 hand support on parallel bars          Leg press   seat 4, feet 2 75# 10x/3 sets                neuromuscular re-education activities to improve: Balance, Coordination, Proprioception, and Posture for 15 minutes. The following activities were included:    Intervention 2/19/2024  Parameters   Standing hand touches - at parallel bars x  x  x    x    x    x Tandem - each leg - 2 minutes  Single leg stance  - each  leg x 2 minutes  Single leg stance - 3 x 30 with horizontal eye movement  Tandem stance with vertical eye movements 1 minutes   Tandem stance with horizontal head movements 5 x each (dizzy after)  Small FELIX head nods, then head turns 5 x each   Tandem stance with eye movement                        Smooth pursuits:  Horizontal eye - 60 Hz x 1m  Vertical eye - 60 Hz x 1m  Diagonal eye - 60 Hz x 1m  Horizontal eye - 80 Hz x 1m  Vertical eye - 80 Hz x 1m  Diagonal eye - 80 Hz x 1m    VOR  Vertical - 60 Hz x 1m  Horizontal - 60 Hz x 1m  Vertical - 80 Hz x 1m  Horizontal - 80 Hz x 1m   Tandem walking               With smooth pursuits:  Horizontal eye - 60 Hz x 1m  Vertical eye - 60 Hz x 1m    VOR  Vertical - 60 Hz x 1m  Horizontal - 60 Hz x 1m     Marching with high knees with cervical stabilization  Laser remaining on object x 1m x 2   Open book   7x each side DC dizzy after   Clams (added)   3 x 10 yellow band bilateral    bridge  3 x 12 (increased)     Toe raises (added)   3 x 10 on airex pad 2 hand support on parallel bars    Sidelying hips series  Abduction x 10   Circles x 10 clockwise/counter clockwise   X 10 forward/backward bicycles (added)    High hurdles - Balance walking over hurdles - 4 passes             Assessed for BPPV  [place] Hallpike negative for nystagmus, did have symptoms of dizziness when moving from supine to tall sitting but no nystagmus          Alison participated in dynamic functional therapeutic activities to improve functional performance for 12  minutes, including:    Intervention 2/19/2024  Parameters   Sit to stand from lower surfaces  2x10 - 16 inches        Agility/balance x      x Sidesteps 3 laps in grass with SB A +1  Carioca 5 laps in parallel bars  Large forward steps  Backward walking 3 lap/ (no forward high march) HHA +1 in grass    Step downs - 6 inch box - trying to use one hand  10x/each side 6 inch step   Step ups   2x10 - 8 in step each leg - single hand support   Matrix  Knee Extension 15lbs plus one small weight - 2x15   Clinic stairs (added)   5 x alternating steps with each foot 2 rails                                  Plan for Next Visit: progress as able        PATIENT EDUCATION AND HOME EXERCISES     Home Exercises Provided and Patient Education Provided     Education provided:   -continue with home exercise program       Written Home Exercises Provided: Patient instructed to cont prior HEP. Exercises were reviewed and Alison was able to demonstrate them prior to the end of the session.  Alison demonstrated good  understanding of the education provided. See EMR under Patient Instructions for exercises provided during therapy sessions      ASSESSMENT     Patient reports that she does still experience balance deficits.  However, she does report that symptoms do feel improved.  There is better performance of tandem stance and single leg stance activities.  However, there is still a significant balance deficit  with eyes closed.  There is a heavy reliance on vision for balance.  Have issued home exercises.  Nearing completion of PT services.  The patient does report that she is being consistent with [facility] exercise regimen.    Alison Is progressing well towards her goals.   Pt prognosis is Good.     Pt will continue to benefit from skilled outpatient physical therapy to address the deficits listed in the problem list box on initial evaluation, provide pt/family education and to maximize pt's level of independence in the home and community environment.     Pt's spiritual, cultural and educational needs considered and pt agreeable to plan of care and goals.     Anticipated barriers to physical therapy:  Anxiety or Panic Disorders, Arthritis, BMI over 30, Cancer, Depression, Incontinence, Previous accidents, Sleep dysfunction    Goals:   Reviewed: 2/19/2024    Short Term Goals: In 4 weeks   1.Patient to be educated on HEP. - met  2.Patient single leg stance balance times to 5 seconds or greater  MET [id number]  3.Patient to increase bilateral lower extremities strength to 4/5 for improved sit to stand from lower surfaces PC  4.Patient to have pain less than 2/10 at worst, to improve QOL. met  5.Patient to improve score on the FOTO, to improve QOL. MET [id number]  6. Patient to demonstrate improved squat to 75% PC - progressing     Long Term Goals: In 8 weeks  1.Patient to improve score on the FOTO to 57 or greater, to improve QOL. MET [id number]  2. Patient to increase bilateral lower extremities strength to 4/5 for improved sit to stand from lower surfaces PC  3. Patient to have decreased pain to 2/10 at worst, to improve QOL. met  4. Patient to demonstrate improved 5 second sit to stand score to 11 seconds PC  5. Patient single leg stance balance times to 5 seconds or greater  MET [id number]  6. Patient to perform on and off the ground without assistance. met  5. Patient to perform daily activities without increased symptoms  including.  Walking on grass or uneven surfaces met  Prolonged walking without complaint of fatigue PC  Squat to ground without apprehension PC       PLAN     Monitor response to today's treatment session and progress with Physical Therapy plan of care as indicated.    Plan of care Certification: 11/22/2023 to 2/22/2024.     Outpatient Physical Therapy 2 times weekly for 10 weeks to include the following interventions: Gait Training, Manual Therapy, Neuromuscular Re-ed, Patient Education, Self Care, Therapeutic Activities, and Therapeutic Exercise.     Connor Puckett, PT

## 2024-02-22 ENCOUNTER — CLINICAL SUPPORT (OUTPATIENT)
Dept: REHABILITATION | Facility: HOSPITAL | Age: 74
End: 2024-02-22
Payer: MEDICARE

## 2024-02-22 DIAGNOSIS — Z74.09 DECREASED STRENGTH, ENDURANCE, AND MOBILITY: ICD-10-CM

## 2024-02-22 DIAGNOSIS — R68.89 DECREASED STRENGTH, ENDURANCE, AND MOBILITY: ICD-10-CM

## 2024-02-22 DIAGNOSIS — R26.89 BALANCE PROBLEM: Primary | Chronic | ICD-10-CM

## 2024-02-22 DIAGNOSIS — R53.1 DECREASED STRENGTH, ENDURANCE, AND MOBILITY: ICD-10-CM
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
## 2024-02-22 PROCEDURE — 97112 NEUROMUSCULAR REEDUCATION: CPT

## 2024-02-22 PROCEDURE — 97110 THERAPEUTIC EXERCISES: CPT

## 2024-02-22 NOTE — PROGRESS NOTES
[facility]   Physical Therapy Treatment Note + Progress Report       Name: Alison Lobo  Clinic Number: [id number]    Therapy Diagnosis:   Encounter Diagnoses   Name Primary?    Balance problem Yes    Fall against object     Decreased strength, endurance, and mobility        Physician: Roselia Gillette MD    Visit Date: 2024    Physician Orders: PT Eval and Treat   Medical Diagnosis from Referral:  Balance problem   Fall (on) (from) unspecified stairs and steps, subsequent encounter   Fall against object   Evaluation Date: 2023  Authorization Period Expiration: 10/23/2024  Plan of Care Expiration: 2024  Progress Note Due: 2024  Visit # / Visits authorized:  (+6)   FOTO: 1/3      Precautions: Standard and Fall    Time In: 1000  Time Out: 1100  Total Billable Time: 60 minutes    SUBJECTIVE     Pt reports:  Patient reports that she continues with her workout classes.  Occasional report of loss of balance  but able to retain balance with methods like sidestep.     She was not compliant with home exercise program.    Response to previous treatment: dizziness with open book exercise    Functional change: weakness in lower extremities, decreased confidence with walking through unfamiliar thresholds, she does report that her balance has improved.     Pain: 0/10  Location:  no pain today    OBJECTIVE     Objective Measures updated at progress report unless specified.     Treadmill walkin.7 mph x 12    Tandem Stance Eyes open - 10 seconds bilaterally, Eyes Closed - 3 seconds  Single leg stance: Eyes open R = 10s, L = 7 seconds. Eyes closed 2 seconds  Foam standing - bilaterally eyes open = 10 seconds, eyes closed = 10 seconds                             Tandem stance eyes open = 7 seconds, eyes closed = 1 second    Functional Tests  Outcome Norms   Timed Up and Go 8.16 <13.5 sec   30 second Sit to Stand 13 Age Male Female   60-64 <14 <12   65-69 <12 <11   70-74  <12 <10   75-79 <11 <10   80-84 <10 <9   85-89 <8 <8   90-93 <7 <4      Five Time Sit to Stand 12.9 60s: <11.4 sec  70s: <12.6 sec  80s: 14.8 sec   6 minutes walk NT 60s: >538f, 572m  70s: >471f, 527m  80s: >417f, 392m       TREATMENT     Alison received the treatments listed below:      therapeutic exercises to develop strength, endurance, ROM, flexibility, and core stabilization for 30 minutes including:       Intervention 2/22/2024   Parameters    Treadmill walking   x  2.5 mph x 10 minutes    nustep  6 minutes level 3    Bike   5 minutes    Standing strengthening exercises on airex pad x  x  x    x Hip Flexion - 3 x 10  Hip Extension 3 x 10  Hip Abduction 3 x 10  Hip Adduction  heel raises 3 x 10   Shuttle   5 Bands, 10x/ 3 sets   Squat    Picking up 6# medicine ball from the floor - 2x10   Heel raises   3 x 10 2 hand support on parallel bars          Leg press   seat 4, feet 2 75# 10x/3 sets    Single leg sit to stand x   30 inch box - 2x10        neuromuscular re-education activities to improve: Balance, Coordination, Proprioception, and Posture for 30 minutes. The following activities were included:    Intervention 2/22/2024  Parameters   Standing hand touches - at parallel bars x  x  x    x    x    x Tandem - each leg - 2 minutes  Single leg stance  - each  leg x 2 minutes  Single leg stance - 3 x 30 with horizontal eye movement  Tandem stance with vertical eye movements 1 minutes   Tandem stance with horizontal head movements 5 x each (dizzy after)  Small FELIX head nods, then head turns 5 x each   Tandem stance with eye movement                        Smooth pursuits:  Horizontal eye - 60 Hz x 1m  Vertical eye - 60 Hz x 1m  Diagonal eye - 60 Hz x 1m  Horizontal eye - 80 Hz x 1m  Vertical eye - 80 Hz x 1m  Diagonal eye - 80 Hz x 1m    VOR  Vertical - 60 Hz x 1m  Horizontal - 60 Hz x 1m  Vertical - 80 Hz x 1m  Horizontal - 80 Hz x 1m   Tandem walking               With smooth pursuits:  Horizontal eye - 60  Hz x 1m  Vertical eye - 60 Hz x 1m    VOR  Vertical - 60 Hz x 1m  Horizontal - 60 Hz x 1m     Marching with high knees with cervical stabilization  Laser remaining on object x 1m x 2   Open book   7x each side DC dizzy after   Clams (added)   3 x 10 yellow band bilateral    bridge  3 x 12 (increased)    Toe raises (added)   3 x 10 on airex pad 2 hand support on parallel bars    Sidelying hips series  Abduction x 10   Circles x 10 clockwise/counter clockwise   X 10 forward/backward bicycles (added)    High hurdles - Balance walking over hurdles - 4 passes             Assessed for BPPV  [place] Hallpike negative for nystagmus, did have symptoms of dizziness when moving from supine to tall sitting but no nystagmus   On and off the floor x supervision   Visual Sensitivity X  x Vertical head movement - 2 passes  Horizontal head movement - 2 passes     Alison participated in dynamic functional therapeutic activities to improve functional performance for 0  minutes, including:    Intervention 2/22/2024  Parameters   Sit to stand from lower surfaces  2x10 - 16 inches        Agility/balance  Sidesteps 3 laps in grass with SB A +1  Carioca 5 laps in parallel bars  Large forward steps  Backward walking 3 lap/ (no forward high march) HHA +1 in grass    Step downs - 6 inch box - trying to use one hand  10x/each side 6 inch step   Step ups   2x10 - 8 in step each leg - single hand support   Matrix  Knee Extension 15lbs plus one small weight - 2x15   Clinic stairs (added)   5 x alternating steps with each foot 2 rails                                  Plan for Next Visit: progress as able        PATIENT EDUCATION AND HOME EXERCISES     Home Exercises Provided and Patient Education Provided     Education provided:   -continue with home exercise program       Written Home Exercises Provided: Patient instructed to cont prior HEP. Exercises were reviewed and Alison was able to demonstrate them prior to the end of the session.  Alison  demonstrated good  understanding of the education provided. See EMR under Patient Instructions for exercises provided during therapy sessions      ASSESSMENT     Patient has demonstrated normative ranges with 30 second sit to stand and TUG.  Also with improved performance of tandem and single leg stance indicative of improved balance.  There are still balance deficits with eyes closed.  This still implicates the high reliance on vision for balance.  There is also good performance with treadmill walking.  Spoke with patient on the importance of continuing with [facility] and [facility] wellness programs.      Alison Is progressing well towards her goals.   Pt prognosis is Good.     Pt will continue to benefit from skilled outpatient physical therapy to address the deficits listed in the problem list box on initial evaluation, provide pt/family education and to maximize pt's level of independence in the home and community environment.     Pt's spiritual, cultural and educational needs considered and pt agreeable to plan of care and goals.     Anticipated barriers to physical therapy:  Anxiety or Panic Disorders, Arthritis, BMI over 30, Cancer, Depression, Incontinence, Previous accidents, Sleep dysfunction    Goals:   Reviewed: 2/22/2024    Short Term Goals: In 4 weeks   1.Patient to be educated on HEP. - met  2.Patient single leg stance balance times to 5 seconds or greater  MET [id number]  3.Patient to increase bilateral lower extremities strength to 4/5 for improved sit to stand from lower surfaces PC  4.Patient to have pain less than 2/10 at worst, to improve QOL. met  5.Patient to improve score on the FOTO, to improve QOL. MET [id number]  6. Patient to demonstrate improved squat to 75% PC - progressing     Long Term Goals: In 8 weeks  1.Patient to improve score on the FOTO to 57 or greater, to improve QOL. MET [id number]  2. Patient to increase bilateral lower extremities strength to 4/5 for improved sit to stand from lower  surfaces PC  3. Patient to have decreased pain to 2/10 at worst, to improve QOL. met  4. Patient to demonstrate improved 5 second sit to stand score to 11 seconds PC  5. Patient single leg stance balance times to 5 seconds or greater  MET [id number]  6. Patient to perform on and off the ground without assistance. met  5. Patient to perform daily activities without increased symptoms including.  Walking on grass or uneven surfaces met  Prolonged walking without complaint of fatigue PC  Squat to ground without apprehension PC       PLAN     At this time, the patient will be discharged to continue with home exercises.    Plan of care Certification: 11/22/2023 to 2/22/2024.     Outpatient Physical Therapy 2 times weekly for 10 weeks to include the following interventions: Gait Training, Manual Therapy, Neuromuscular Re-ed, Patient Education, Self Care, Therapeutic Activities, and Therapeutic Exercise.     Connor Puckett, PT

## 2024-02-28 ENCOUNTER — TELEPHONE (OUTPATIENT)
Dept: PRIMARY CARE CLINIC | Facility: CLINIC | Age: 74
End: 2024-02-28
Payer: MEDICARE

## 2024-02-28 ENCOUNTER — PATIENT MESSAGE (OUTPATIENT)
Dept: PRIMARY CARE CLINIC | Facility: CLINIC | Age: 74
End: 2024-02-28
Payer: MEDICARE

## 2024-02-28 NOTE — PLAN OF CARE
[facility]   Physical Therapy Discharge Note       Name: Alison Lobo  Clinic Number: [id number]    Therapy Diagnosis:   Encounter Diagnoses   Name Primary?    Balance problem Yes    Fall against object     Decreased strength, endurance, and mobility        Physician: Roselia Gillette MD    Visit Date: 2024    Physician Orders: PT Eval and Treat   Medical Diagnosis from Referral:  Balance problem   Fall (on) (from) unspecified stairs and steps, subsequent encounter   Fall against object   Evaluation Date: 2023  Authorization Period Expiration: 10/23/2024  Plan of Care Expiration: 2024  Progress Note Due: 2024  Visit # / Visits authorized:  (+6)   FOTO: 1/3      Precautions: Standard and Fall    Time In: 1000  Time Out: 1100  Total Billable Time: 60 minutes    SUBJECTIVE     Pt reports:  Patient reports that she continues with her workout classes.  Occasional report of loss of balance  but able to retain balance with methods like sidestep.     She was not compliant with home exercise program.    Response to previous treatment: dizziness with open book exercise    Functional change: weakness in lower extremities, decreased confidence with walking through unfamiliar thresholds, she does report that her balance has improved.     Pain: 0/10  Location:  no pain today    OBJECTIVE     Objective Measures updated at progress report unless specified.     Treadmill walkin.7 mph x 12    Tandem Stance Eyes open - 10 seconds bilaterally, Eyes Closed - 3 seconds  Single leg stance: Eyes open R = 10s, L = 7 seconds. Eyes closed 2 seconds  Foam standing - bilaterally eyes open = 10 seconds, eyes closed = 10 seconds                             Tandem stance eyes open = 7 seconds, eyes closed = 1 second    Functional Tests  Outcome Norms   Timed Up and Go 8.16 <13.5 sec   30 second Sit to Stand 13 Age Male Female   60-64 <14 <12   65-69 <12 <11   70-74 <12 <10   75-79 <11  <10   80-84 <10 <9   85-89 <8 <8   90-93 <7 <4      Five Time Sit to Stand 12.9 60s: <11.4 sec  70s: <12.6 sec  80s: 14.8 sec   6 minutes walk NT 60s: >538f, 572m  70s: >471f, 527m  80s: >417f, 392m       TREATMENT     Alison received the treatments listed below:      therapeutic exercises to develop strength, endurance, ROM, flexibility, and core stabilization for 30 minutes including:       Intervention 2/22/2024   Parameters    Treadmill walking   x  2.5 mph x 10 minutes    nustep  6 minutes level 3    Bike   5 minutes    Standing strengthening exercises on airex pad x  x  x    x Hip Flexion - 3 x 10  Hip Extension 3 x 10  Hip Abduction 3 x 10  Hip Adduction  heel raises 3 x 10   Shuttle   5 Bands, 10x/ 3 sets   Squat    Picking up 6# medicine ball from the floor - 2x10   Heel raises   3 x 10 2 hand support on parallel bars          Leg press   seat 4, feet 2 75# 10x/3 sets    Single leg sit to stand x   30 inch box - 2x10        neuromuscular re-education activities to improve: Balance, Coordination, Proprioception, and Posture for 30 minutes. The following activities were included:    Intervention 2/22/2024  Parameters   Standing hand touches - at parallel bars x  x  x    x    x    x Tandem - each leg - 2 minutes  Single leg stance  - each  leg x 2 minutes  Single leg stance - 3 x 30 with horizontal eye movement  Tandem stance with vertical eye movements 1 minutes   Tandem stance with horizontal head movements 5 x each (dizzy after)  Small FELIX head nods, then head turns 5 x each   Tandem stance with eye movement                        Smooth pursuits:  Horizontal eye - 60 Hz x 1m  Vertical eye - 60 Hz x 1m  Diagonal eye - 60 Hz x 1m  Horizontal eye - 80 Hz x 1m  Vertical eye - 80 Hz x 1m  Diagonal eye - 80 Hz x 1m    VOR  Vertical - 60 Hz x 1m  Horizontal - 60 Hz x 1m  Vertical - 80 Hz x 1m  Horizontal - 80 Hz x 1m   Tandem walking               With smooth pursuits:  Horizontal eye - 60 Hz x 1m  Vertical eye  - 60 Hz x 1m    VOR  Vertical - 60 Hz x 1m  Horizontal - 60 Hz x 1m     Marching with high knees with cervical stabilization  Laser remaining on object x 1m x 2   Open book   7x each side DC dizzy after   Clams (added)   3 x 10 yellow band bilateral    bridge  3 x 12 (increased)    Toe raises (added)   3 x 10 on airex pad 2 hand support on parallel bars    Sidelying hips series  Abduction x 10   Circles x 10 clockwise/counter clockwise   X 10 forward/backward bicycles (added)    High hurdles - Balance walking over hurdles - 4 passes             Assessed for BPPV  Lorraine Hallpike negative for nystagmus, did have symptoms of dizziness when moving from supine to tall sitting but no nystagmus   On and off the floor x supervision   Visual Sensitivity X  x Vertical head movement - 2 passes  Horizontal head movement - 2 passes     Alison participated in dynamic functional therapeutic activities to improve functional performance for 0  minutes, including:    Intervention 2/22/2024  Parameters   Sit to stand from lower surfaces  2x10 - 16 inches        Agility/balance  Sidesteps 3 laps in grass with SB A +1  Carioca 5 laps in parallel bars  Large forward steps  Backward walking 3 lap/ (no forward high march) HHA +1 in grass    Step downs - 6 inch box - trying to use one hand  10x/each side 6 inch step   Step ups   2x10 - 8 in step each leg - single hand support   Matrix  Knee Extension 15lbs plus one small weight - 2x15   Clinic stairs (added)   5 x alternating steps with each foot 2 rails                                  Plan for Next Visit: progress as able        PATIENT EDUCATION AND HOME EXERCISES     Home Exercises Provided and Patient Education Provided     Education provided:   -continue with home exercise program       Written Home Exercises Provided: Patient instructed to cont prior HEP. Exercises were reviewed and Alison was able to demonstrate them prior to the end of the session.  Alison demonstrated good   understanding of the education provided. See EMR under Patient Instructions for exercises provided during therapy sessions      ASSESSMENT     Patient has demonstrated normative ranges with 30 second sit to stand and TUG.  Also with improved performance of tandem and single leg stance indicative of improved balance.  There are still balance deficits with eyes closed.  This still implicates the high reliance on vision for balance.  There is also good performance with treadmill walking.  Spoke with patient on the importance of continuing with [facility] and [facility] wellness programs.      Alison Is progressing well towards her goals.   Pt prognosis is Good.     Pt will continue to benefit from skilled outpatient physical therapy to address the deficits listed in the problem list box on initial evaluation, provide pt/family education and to maximize pt's level of independence in the home and community environment.     Pt's spiritual, cultural and educational needs considered and pt agreeable to plan of care and goals.     Anticipated barriers to physical therapy:  Anxiety or Panic Disorders, Arthritis, BMI over 30, Cancer, Depression, Incontinence, Previous accidents, Sleep dysfunction    Goals:   Reviewed: 2/22/2024    Short Term Goals: In 4 weeks   1.Patient to be educated on HEP. - met  2.Patient single leg stance balance times to 5 seconds or greater  MET [id number]  3.Patient to increase bilateral lower extremities strength to 4/5 for improved sit to stand from lower surfaces PC  4.Patient to have pain less than 2/10 at worst, to improve QOL. met  5.Patient to improve score on the FOTO, to improve QOL. MET [id number]  6. Patient to demonstrate improved squat to 75% PC - progressing     Long Term Goals: In 8 weeks  1.Patient to improve score on the FOTO to 57 or greater, to improve QOL. MET [id number]  2. Patient to increase bilateral lower extremities strength to 4/5 for improved sit to stand from lower surfaces PC  3.  Patient to have decreased pain to 2/10 at worst, to improve QOL. met  4. Patient to demonstrate improved 5 second sit to stand score to 11 seconds PC  5. Patient single leg stance balance times to 5 seconds or greater  MET [id number]  6. Patient to perform on and off the ground without assistance. met  5. Patient to perform daily activities without increased symptoms including.  Walking on grass or uneven surfaces met  Prolonged walking without complaint of fatigue PC  Squat to ground without apprehension PC       PLAN     At this time, the patient will be discharged to continue with home exercises.    Plan of care Certification: 11/22/2023 to 2/22/2024.     Outpatient Physical Therapy 2 times weekly for 10 weeks to include the following interventions: Gait Training, Manual Therapy, Neuromuscular Re-ed, Patient Education, Self Care, Therapeutic Activities, and Therapeutic Exercise.     Connor Puckett, PT

## 2024-02-28 NOTE — TELEPHONE ENCOUNTER
Called patient to discuss rescheduling appointment. Spoke with patient; she was driving and could not schedule at present. Sent My Chart message offering availalble appointment times.

## 2024-03-04 ENCOUNTER — CLINICAL SUPPORT (OUTPATIENT)
Dept: PRIMARY CARE CLINIC | Facility: CLINIC | Age: 74
End: 2024-03-04
Payer: MEDICARE

## 2024-03-04 DIAGNOSIS — F41.9 ANXIETY: Primary | ICD-10-CM

## 2024-03-04 PROCEDURE — 99499 UNLISTED E&M SERVICE: CPT | Mod: S$PBB,,,

## 2024-03-04 NOTE — PROGRESS NOTES
"Individual Psychotherapy ([facility])  Alison Lobo  3/4/2024  DATE:  3/4/2024  TYPE OF VISIT:  In person  LENGTH OF SESSION: 45    Therapeutic Intervention: Met with patient for individual psychotherapy.    Chief complaint/reason for encounter: depression, anxiety, sleep, and appetite       Session Content/Presenting Problem:     Patient reports having thoughts regarding her goal to be peaceful and satisfied. She is trying to define what that will look like in her life. She recognizes that she is prone to overthinking and delaying action. In order to feel peaceful and satisfied  she wants to be prepared for her eventual death. She wants to have researched living options in the event she may someday need assistance. She plans to call her , Alverto, to check on her will and the other arrangements she has made for her grandkids. She does not feel peaceful with having left Rachel out of the will completely. She is worried that their rift is growing with every passing year. Discussed her feeling that the grandkids are not reaching out. Encouraged patient to be assertive in asking for what she needs from the grandkids. Also discussed keeping lines of communication open for Rachel to contact her even if only through Rachel's . Patient continues to focus on feeling relevant to others as a way of reaching her goal of feeling satisfaction with life. 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 She plans to be very available to her family and friends.  Discussed mapping out this "fourth quarter" of life in order to accomplish her goals of finding peace and satisfaction in her present day life.         Prior Session:  Patient has been dealing with vertigo; she will be getting her crystals adjusted and doing therapy for the vertigo.  Patient feels she has been more aware of distorted thoughts regarding her daughter Rachel. She is working on restructuring her thougts and identifying alternatives to her " "negative thoughts. Patient notes similarity in her history of cutting contact with others to what Rachel has now done to her. "I leave people; when it get's hard, I just go away." Discussed the letter from Rachel.  Patient was encouraged to consider alternatives to her negative thoughts regarding the letter and her interpretation of Rachel's feelings.  She saw the letter as angry and vindictive; discussed alternative interpretation of the letter's tone.  Patient plans to focus on her relationship with her two grandkids; she has worked on restructuring her negative thoughts which had her feeling forgotten and ignored by the kids. She is able to acknowledge how kids communicate through texts; she has recent texts from Z[place] indicating a desire to see patient soon. Encouraged patient to try to arrange some time with them during the summer.  Patient expresses a desire to continue to work on increasing her insight of negative thought patterns and to focus on improving her communication and interactions with others. Discussed possibly contacting family members she broke contact with long ago.    Current symptoms:  Depression: anhedonia, worthlessness/guilt, hopelessness, and decreased appetite.  Anxiety: excessive worrying and restlessness.  Insomnia: non-restful sleep.  Heidi:  denies.  Psychosis: denies .      Risk parameters:  Patient reports no suicidal ideation  Patient reports no homicidal ideation  Patient reports no self-injurious behavior  Patient reports no violent behavior    MENTAL HEALTH STATUS EXAM  General Appearance:  unremarkable, age appropriate   Speech: normal tone, normal rate, normal pitch, normal volume      Level of Cooperation: cooperative      Thought Processes: normal and logical   Mood: steady      Thought Content: normal, no suicidality, no homicidality, delusions, or paranoia   Affect: congruent and appropriate   Orientation: Oriented x3   Attention Span & Concentration: intact   Fund " "of General Knowledge: intact and appropriate to age and level of education   Judgment & Insight: good     Language  intact       STRENGTHS AND LIABILITIES: Strength: Patient accepts guidance/feedback, Strength: Patient is expressive/articulate., Strength: Patient has reasonable judgment., Strength: Patient is stable.    IMPRESSION:   My diagnostic impression is Anxiety disorders; generalized anxiety disorder [F41.1] and Major Depressive Disorder, Recurrent, Mild (F33.0), as evidenced by feeling down, depressed, feeling irritable, difficulty sleeping, poor appetite, worrying too much.     TREATMENT GOALS (per patient):    "To feel more relevant. "How can I be relevant to other people."   To experience calmness and peacefulness. To explore the estrangement from her only child, Rachel.    Exercise, sleep better, increase enjoyable activities.     Treatment plan:  Target symptoms: recurrent depression, anxiety , adjustment  Why chosen therapy is appropriate versus another modality: relevant to diagnosis, patient responds to this modality, evidence based practice  Outcome monitoring methods: self-report, observation, checklist/rating scale  Therapeutic intervention type: insight oriented psychotherapy    Patient's response to intervention:  The patient's response to intervention is guarded.    Progress toward goals and other mental status changes:  The patient's progress toward goals is good.    Plan: Pt plans to continue individual psychotherapy CBT will be utilized in future individual therapy sessions to increase interaction, insight, and support.     Return to clinic: 3 weeks March 21st            "

## 2024-03-06 DIAGNOSIS — M19.90 OSTEOARTHRITIS, UNSPECIFIED OSTEOARTHRITIS TYPE, UNSPECIFIED SITE: ICD-10-CM

## 2024-03-07 RX ORDER — MELOXICAM 7.5 MG/1
7.5 TABLET ORAL DAILY
Qty: 90 TABLET | Refills: 0 | Status: SHIPPED | OUTPATIENT
Start: 2024-03-07 | End: 2024-03-19 | Stop reason: SDUPTHER

## 2024-03-13 RX ORDER — TAMOXIFEN CITRATE 20 MG/1
20 TABLET ORAL
Qty: 90 TABLET | Refills: 3 | Status: SHIPPED | OUTPATIENT
Start: 2024-03-13

## 2024-03-18 RX ORDER — FLUTICASONE PROPIONATE 50 MCG
SPRAY, SUSPENSION (ML) NASAL
Qty: 16 G | Refills: 0 | Status: SHIPPED | OUTPATIENT
Start: 2024-03-18 | End: 2024-05-23

## 2024-03-19 ENCOUNTER — OFFICE VISIT (OUTPATIENT)
Dept: PRIMARY CARE CLINIC | Facility: CLINIC | Age: 74
End: 2024-03-19
Payer: MEDICARE

## 2024-03-19 VITALS
TEMPERATURE: 98 F | HEIGHT: 63 IN | OXYGEN SATURATION: 97 % | DIASTOLIC BLOOD PRESSURE: 60 MMHG | HEART RATE: 94 BPM | BODY MASS INDEX: 31.87 KG/M2 | SYSTOLIC BLOOD PRESSURE: 122 MMHG

## 2024-03-19 DIAGNOSIS — L30.9 DERMATITIS: ICD-10-CM

## 2024-03-19 DIAGNOSIS — G47.33 OSA ON CPAP: Chronic | ICD-10-CM

## 2024-03-19 DIAGNOSIS — E03.9 ACQUIRED HYPOTHYROIDISM: Chronic | ICD-10-CM

## 2024-03-19 DIAGNOSIS — F33.41 MDD (MAJOR DEPRESSIVE DISORDER), RECURRENT, IN PARTIAL REMISSION: Chronic | ICD-10-CM

## 2024-03-19 DIAGNOSIS — R26.9 ABNORMALITY OF GAIT AND MOBILITY: Primary | ICD-10-CM

## 2024-03-19 DIAGNOSIS — E78.49 OTHER HYPERLIPIDEMIA: Chronic | ICD-10-CM

## 2024-03-19 DIAGNOSIS — Z00.00 ENCOUNTER FOR PREVENTIVE HEALTH EXAMINATION: ICD-10-CM

## 2024-03-19 DIAGNOSIS — M19.90 OSTEOARTHRITIS, UNSPECIFIED OSTEOARTHRITIS TYPE, UNSPECIFIED SITE: ICD-10-CM

## 2024-03-19 PROCEDURE — 99214 OFFICE O/P EST MOD 30 MIN: CPT | Mod: PBBFAC,PN | Performed by: NURSE PRACTITIONER

## 2024-03-19 PROCEDURE — 99999 PR PBB SHADOW E&M-EST. PATIENT-LVL IV: CPT | Mod: PBBFAC,,, | Performed by: NURSE PRACTITIONER

## 2024-03-19 PROCEDURE — G0439 PPPS, SUBSEQ VISIT: HCPCS | Mod: ,,, | Performed by: NURSE PRACTITIONER

## 2024-03-19 RX ORDER — MELOXICAM 7.5 MG/1
15 TABLET ORAL DAILY
Qty: 180 TABLET | Refills: 1 | Status: SHIPPED | OUTPATIENT
Start: 2024-03-19 | End: 2024-09-15

## 2024-03-19 NOTE — PROGRESS NOTES
"Alison Lobo presented for a follow-up Medicare AWV today. The following components were reviewed and updated:    Medical history  Family History  Social history  Allergies and Current Medications  Health Risk Assessment  Health Maintenance  Care Team    **See Completed Assessments for Annual Wellness visit with in the encounter summary    The following assessments were completed:  Depression Screening  Cognitive function Screening  Timed Get Up Test  Whisper Test        Multiple bites both arms, back. Onset 3 months ago. Wax and wane but no new lesions. Received steroid injection with temporary relief. Saw allergist a long time ago.     Takes meloxicam daily. Very helpful for general OA. Occasionally takes naproxen also on bad days.     Has severe "hot flashes" since living in [place]. Has significant sweating, wet hair. Occurs when temperature is cool. Occurs daily. No associated sx. Onset remote, possibly since menopause, much worse after moving to [place] 2 years ago. Taking bupropion since October. Taking Effexor for the past 2 years. Onset of hot flashes prior to beginning antidepressants just worse now.     Followed by Dr Whatley, breast surgeon. No oncologist here.     Has appt with psychiatry in 1 month.     Weight fluctuations. Exercising now.         Lab Results   Component Value Date    TSH 2.763 01/18/2024     Lab Results   Component Value Date    HGBA1C 5.5 01/18/2024             Vitals:    03/19/24 1323   BP: 122/60   BP Location: Right arm   Pulse: 94   Temp: 98.2 °F (36.8 °C)   TempSrc: Oral   SpO2: 97%   Height: 5' 3" (1.6 m)     Body mass index is 31.87 kg/m².   ]        Diagnoses and health risks identified today and associated recommendations/orders:  Problem List Items Addressed This Visit          Psychiatric    MDD (major depressive disorder), recurrent, in partial remission (Chronic)     Followed by psychiatry. Mood is stable.  Continue POC.            Cardiac/Vascular    Hyperlipidemia " (Chronic)     Lab Results   Component Value Date    LDLCALC 86.2 01/18/2024   Continue POC              Endocrine    Acquired hypothyroidism (Chronic)     Lab Results   Component Value Date    TSH 2.763 01/18/2024   Continue POC              Other    JADA on CPAP (Chronic)     Continue CPAP use.          Other Visit Diagnoses       Abnormality of gait and mobility    -  Primary    Osteoarthritis, unspecified osteoarthritis type, unspecified site        Relevant Medications    meloxicam (MOBIC) 7.5 MG tablet    Encounter for preventive health examination        Dermatitis        Relevant Orders    Ambulatory referral/consult to Dermatology            Provided Alison with a 5-10 year written screening schedule and personal prevention plan. Recommendations were developed using the USPSTF age appropriate recommendations. Education, counseling, and referrals were provided as needed.  After Visit Summary printed and given to patient which includes a list of additional screenings\tests needed.        Follow up in about 3 months (around 6/19/2024).      FELICITY Somers offered to discuss advanced care planning, including how to pick a person who would make decisions for you if you were unable to make them for yourself, called a health care power of , and what kind of decisions you might make such as use of life sustaining treatments such as ventilators and tube feeding when faced with a life limiting illness recorded on a living will that they will need to know. (How you want to be cared for as you near the end of your natural life)     X Patient is interested in learning more about how to make advanced directives.  I provided them paperwork and offered to discuss this with them.

## 2024-03-19 NOTE — PATIENT INSTRUCTIONS
Counseling and Referral of Other Preventative  (Italic type indicates deductible and co-insurance are waived)    Patient Name: Alison Lobo  Today's Date: 3/19/2024    Health Maintenance       Date Due Completion Date    Hemoglobin A1c (Prediabetes) 01/18/2025 1/18/2024    Colorectal Cancer Screening 09/13/2025 9/13/2022    DEXA Scan 05/12/2027 5/12/2022    Lipid Panel 01/18/2029 1/18/2024    TETANUS VACCINE 05/09/2029 5/9/2019        No orders of the defined types were placed in this encounter.    The following information is provided to all patients.  This information is to help you find resources for any of the problems found today that may be affecting your health:                  Living healthy guide: www.[facility].[place].gov      Understanding Diabetes: www.diabetes.org      Eating healthy: www.cdc.gov/healthyweight      [facility] home safety checklist: www.cdc.gov/steadi/patient.html      Agency on Aging: www.goea.[place].gov      Alcoholics anonymous (AA): www.aa.org      Physical Activity: www.arsh.nih.gov/xv5atjz      Tobacco use: www.quitwithusla.org

## 2024-03-21 ENCOUNTER — CLINICAL SUPPORT (OUTPATIENT)
Dept: PRIMARY CARE CLINIC | Facility: CLINIC | Age: 74
End: 2024-03-21
Payer: MEDICARE

## 2024-03-21 DIAGNOSIS — F41.9 ANXIETY: Primary | ICD-10-CM

## 2024-03-21 DIAGNOSIS — F33.1 MODERATE EPISODE OF RECURRENT MAJOR DEPRESSIVE DISORDER: ICD-10-CM

## 2024-03-21 PROCEDURE — 99499 UNLISTED E&M SERVICE: CPT | Mod: S$PBB,,,

## 2024-03-21 NOTE — PROGRESS NOTES
"Individual Psychotherapy (Formerly [facility])  Alison Lobo  3/21/2024  DATE:  3/21/2024  TYPE OF VISIT:  In person  LENGTH OF SESSION: 45    Therapeutic Intervention: Met with patient for individual psychotherapy.    Chief complaint/reason for encounter: depression, anxiety, sleep, and appetite       Session Content/Presenting Problem:    Patient expresses struggling with the concept of "Existential aloneness"  and being comfortable in this current life phase.  Discussed the life goal of this stage of life according to Nito: Integrity versus despair. Patient expresses regret over the relationship with her daughter.  Patient discusses her earlier life when she was focused on being a "Human doing"; accomplishment and work success was primary. Now,she is trying to increase her emphasis on being a "Human being."   Wants to be able to be proud of herself for being a good person - following her moral compass. Patient says she was always good at walking away whenever things got too hard. Now she is worried that her daughter has done the same to her. Is afraid that Rachel will never forgiver her. This is very painful for patient.   She says "there is no opportunity for repair of that relationship."  Patient acknowledges that in the past ten years she has not apologized to Rachel for any of the things Rachel complained about.  Says Rachel was always an outsider and she was not proud of her.  She can acknowledge she is proud of Rachel for being a good mom to her kids.  Encouraged patient to focus on that. This is the opening that Rachel has allowed - the relationship with the grandkids. Encouraged patient to nurture that relationship and not give it up. Patient has a difficult time saying why she loves Rachel. Encouraged patient to look beyond Rachel as a person she does not understand. Focus on loving her just because she is your flesh and blood; that is all the reason that is needed. That is why the separation hurts so " "much.  It is possible to love someone even though they are difficult to understand or to like. Patient expresses a desire to continue to work on increasing her insight of negative thought patterns and to focus on improving her communication and interactions with others.  Patient will read the book "Parents Who Hurt" to increase understanding of parental estrangement and consider possible ways to mend the relationship.     Prior Session:   Patient reports having thoughts regarding her goal to be peaceful and satisfied. She is trying to define what that will look like in her life. She recognizes that she is prone to overthinking and delaying action. In order to feel peaceful and satisfied  she wants to be prepared for her eventual death. She wants to have researched living options in the event she may someday need assistance. She plans to call her , Alverto, to check on her will and the other arrangements she has made for her grandkids. She does not feel peaceful with having left Rachel out of the will completely. She is worried that their rift is growing with every passing year. Discussed her feeling that the grandkids are not reaching out. Encouraged patient to be assertive in asking for what she needs from the grandkids. Also discussed keeping lines of communication open for Rachel to contact her even if only through Rachel's . Patient continues to focus on feeling relevant to others as a way of reaching her goal of feeling satisfaction with life. 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 She plans to be very available to her family and friends.  Discussed mapping out this "fourth quarter" of life in order to accomplish her goals of finding peace and satisfaction in her present day life.     Current symptoms:  Depression: anhedonia, worthlessness/guilt, hopelessness, and decreased appetite.  Anxiety: excessive worrying and restlessness.  Insomnia: non-restful sleep.  Heidi:  " "denies.  Psychosis: denies .      Risk parameters:  Patient reports no suicidal ideation  Patient reports no homicidal ideation  Patient reports no self-injurious behavior  Patient reports no violent behavior    MENTAL HEALTH STATUS EXAM  General Appearance:  unremarkable, age appropriate   Speech: normal tone, normal rate, normal pitch, normal volume      Level of Cooperation: cooperative      Thought Processes: normal and logical   Mood: steady      Thought Content: normal, no suicidality, no homicidality, delusions, or paranoia   Affect: congruent and appropriate   Orientation: Oriented x3   Attention Span & Concentration: intact   Fund of General Knowledge: intact and appropriate to age and level of education   Judgment & Insight: good     Language  intact       STRENGTHS AND LIABILITIES: Strength: Patient accepts guidance/feedback, Strength: Patient is expressive/articulate., Strength: Patient has reasonable judgment., Strength: Patient is stable.    IMPRESSION:   My diagnostic impression is Anxiety disorders; generalized anxiety disorder [F41.1] and Major Depressive Disorder, Recurrent, Mild (F33.0), as evidenced by feeling down, depressed, feeling irritable, difficulty sleeping, poor appetite, worrying too much.     TREATMENT GOALS (per patient):    "To feel more relevant. "How can I be relevant to other people."   To experience calmness and peacefulness. To cope with the estrangement from her only child, Rachel.    Exercise, sleep better, increase enjoyable activities.     Treatment plan:  Target symptoms: recurrent depression, anxiety , adjustment  Why chosen therapy is appropriate versus another modality: relevant to diagnosis, patient responds to this modality, evidence based practice  Outcome monitoring methods: self-report, observation, checklist/rating scale  Therapeutic intervention type: insight oriented psychotherapy    Patient's response to intervention:  The patient's response to intervention is " guarded.    Progress toward goals and other mental status changes:  The patient's progress toward goals is good.    Plan: Pt plans to continue individual psychotherapy CBT will be utilized in future individual therapy sessions to increase interaction, insight, and support.     Return to clinic: 2 weeks April 3rd

## 2024-03-26 ENCOUNTER — PATIENT MESSAGE (OUTPATIENT)
Dept: SURGERY | Facility: CLINIC | Age: 74
End: 2024-03-26
Payer: MEDICARE

## 2024-03-26 DIAGNOSIS — Z00.00 ENCOUNTER FOR MEDICARE ANNUAL WELLNESS EXAM: ICD-10-CM
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
## 2024-04-01 DIAGNOSIS — C50.012 MALIGNANT NEOPLASM OF NIPPLE OF LEFT BREAST IN FEMALE, ESTROGEN RECEPTOR POSITIVE: ICD-10-CM

## 2024-04-01 DIAGNOSIS — C50.411 MALIGNANT NEOPLASM OF UPPER-OUTER QUADRANT OF RIGHT BREAST IN FEMALE, ESTROGEN RECEPTOR POSITIVE: Primary | Chronic | ICD-10-CM

## 2024-04-01 DIAGNOSIS — Z17.0 MALIGNANT NEOPLASM OF NIPPLE OF LEFT BREAST IN FEMALE, ESTROGEN RECEPTOR POSITIVE: ICD-10-CM

## 2024-04-01 DIAGNOSIS — Z17.0 MALIGNANT NEOPLASM OF UPPER-OUTER QUADRANT OF RIGHT BREAST IN FEMALE, ESTROGEN RECEPTOR POSITIVE: Primary | Chronic | ICD-10-CM

## 2024-04-25 ENCOUNTER — PATIENT MESSAGE (OUTPATIENT)
Dept: PRIMARY CARE CLINIC | Facility: CLINIC | Age: 74
End: 2024-04-25
Payer: MEDICARE

## 2024-05-01 RX ORDER — LEVOTHYROXINE SODIUM 25 UG/1
25 TABLET ORAL
Qty: 30 TABLET | Refills: 5 | Status: SHIPPED | OUTPATIENT
Start: 2024-05-01

## 2024-05-01 RX ORDER — MONTELUKAST SODIUM 10 MG/1
10 TABLET ORAL NIGHTLY
Qty: 90 TABLET | Refills: 1 | Status: SHIPPED | OUTPATIENT
Start: 2024-05-01

## 2024-05-02 DIAGNOSIS — R73.03 PREDIABETES: Chronic | ICD-10-CM

## 2024-05-02 RX ORDER — METFORMIN HYDROCHLORIDE 500 MG/1
TABLET ORAL
Qty: 225 TABLET | Refills: 1 | Status: SHIPPED | OUTPATIENT
Start: 2024-05-02 | End: 2024-05-13

## 2024-05-12 ENCOUNTER — PATIENT MESSAGE (OUTPATIENT)
Dept: CARDIOLOGY | Facility: CLINIC | Age: 74
End: 2024-05-12
Payer: MEDICARE

## 2024-05-12 ENCOUNTER — PATIENT MESSAGE (OUTPATIENT)
Dept: PRIMARY CARE CLINIC | Facility: CLINIC | Age: 74
End: 2024-05-12
Payer: MEDICARE

## 2024-05-13 ENCOUNTER — CLINICAL SUPPORT (OUTPATIENT)
Dept: PRIMARY CARE CLINIC | Facility: CLINIC | Age: 74
End: 2024-05-13
Payer: MEDICARE

## 2024-05-13 ENCOUNTER — PATIENT MESSAGE (OUTPATIENT)
Dept: PRIMARY CARE CLINIC | Facility: CLINIC | Age: 74
End: 2024-05-13

## 2024-05-13 DIAGNOSIS — F41.9 ANXIETY: Primary | ICD-10-CM

## 2024-05-13 DIAGNOSIS — E53.8 B12 DEFICIENCY: ICD-10-CM

## 2024-05-13 DIAGNOSIS — M79.10 MYALGIA: Primary | Chronic | ICD-10-CM

## 2024-05-13 DIAGNOSIS — R73.03 PREDIABETES: Chronic | ICD-10-CM

## 2024-05-13 PROCEDURE — 99499 UNLISTED E&M SERVICE: CPT | Mod: S$PBB,,,

## 2024-05-13 RX ORDER — ACETAMINOPHEN 500 MG
5000 TABLET ORAL DAILY
COMMUNITY

## 2024-05-13 RX ORDER — LANOLIN ALCOHOL/MO/W.PET/CERES
1000 CREAM (GRAM) TOPICAL DAILY
COMMUNITY
End: 2024-06-03

## 2024-05-13 RX ORDER — METFORMIN HYDROCHLORIDE 500 MG/1
1000 TABLET ORAL DAILY
Qty: 180 TABLET | Refills: 1 | Status: SHIPPED | OUTPATIENT
Start: 2024-05-13

## 2024-05-13 RX ORDER — RIFAMPIN 300 MG/1
300 CAPSULE ORAL DAILY
COMMUNITY
End: 2024-06-03

## 2024-05-13 NOTE — PROGRESS NOTES
"Individual Psychotherapy ([facility])  Alison Lobo  5/13/2024  DATE:  5/13/2024  TYPE OF VISIT:  In person  LENGTH OF SESSION: 45    Therapeutic Intervention: Met with patient for individual psychotherapy.    Chief complaint/reason for encounter: depression, anxiety, sleep, and appetite       Session Content/Presenting Problem:      Met with patient; have not seen in over a month. Patient reports trying to read the book about parent-child estrangement but found it hard to get through. She may read some more of it. Patient says she has made a decision: "I want to put her (Rachel) aside in some kind of way".  Patient feels she has taken responsibility for actions on her part. Does not see the same from Rachel. Discusses her anger at Rachel for being difficult. Says she is unable to feel optimistic that they will have more of a relationship than they do now.  Patient feels she has invested enough time in trying to go along with Rachel's decisions on what the relationship will and won't be. Says: " I don't want to invest so much emotional energy in this person."  Supported patient in this decision. Explored ways in which she can focus on her own health and happiness. Patient realizes that the estrangement has held her back from enjoying her life.  "I am tired of my heart aching." She no longer wants to spend time thinking about the situation.  She will see one of the grandkids, Godfrey. He requested to come for a visit. She will be here for the grandkids but will no longer make all of the arrangements, fly out to see them, etc.    Encouraged patient to consider what she needs to feel more settled and happy in her life as it is now. Patient has found it difficult to accept that [place] is her home. She will focus on the reason she is here - to be close to her cousin, Beatriz Cespedes, and her health issues. Reminded patient of all she has done recently to become more settled here. She has been active in [employer]; she " "goes to the gym regularly; she spends time with her cousins.   Patient discusses her desire to travel more. Has used her cat as a reason not to travel. She also would like to have a . Explored with her the negative thoughts that have stopped her from allowing herself to hire a  and to plan travel.   Patient also discusses her need for a medication check up, particularly her psych meds. Has not seen a psychiatrist here. She is okay with [facility] notifying Dr. Gillette and requesting a psych referral. Also discussed the possibility of having a virtual visit with Psych PA, Nichole Ferguson, to review meds. Patient is open to these options.         Prior Session:  Patient expresses struggling with the concept of "Existential aloneness"  and being comfortable in this current life phase.  Discussed the life goal of this stage of life according to [place]: Integrity versus despair. Patient expresses regret over the relationship with her daughter.  Patient discusses her earlier life when she was focused on being a "Human doing"; accomplishment and work success was primary. Now,she is trying to increase her emphasis on being a "Human being."   Wants to be able to be proud of herself for being a good person - following her moral compass. Patient says she was always good at walking away whenever things got too hard. Now she is worried that her daughter has done the same to her. Is afraid that Rachel will never forgiver her. This is very painful for patient.   She says "there is no opportunity for repair of that relationship."  Patient acknowledges that in the past ten years she has not apologized to Rachel for any of the things Rachel complained about.  Says Rachel was always an outsider and she was not proud of her.  She can acknowledge she is proud of Rachel for being a good mom to her kids.  Encouraged patient to focus on that. This is the opening that Rachel has allowed - the relationship with " "the grandkids. Encouraged patient to nurture that relationship and not give it up. Patient has a difficult time saying why she loves Rachel. Encouraged patient to look beyond Rachel as a person she does not understand. Focus on loving her just because she is your flesh and blood; that is all the reason that is needed. That is why the separation hurts so much.  It is possible to love someone even though they are difficult to understand or to like. Patient expresses a desire to continue to work on increasing her insight of negative thought patterns and to focus on improving her communication and interactions with others.  Patient will read the book "Parents Who Hurt" to increase understanding of parental estrangement and consider possible ways to mend the relationship.       Current symptoms:  Depression: anhedonia, worthlessness/guilt, hopelessness, and decreased appetite.  Anxiety: excessive worrying and restlessness.  Insomnia: non-restful sleep.  Heidi:  denies.  Psychosis: denies .      Risk parameters:  Patient reports no suicidal ideation  Patient reports no homicidal ideation  Patient reports no self-injurious behavior  Patient reports no violent behavior    MENTAL HEALTH STATUS EXAM  General Appearance:  unremarkable, age appropriate   Speech: normal tone, normal rate, normal pitch, normal volume      Level of Cooperation: cooperative      Thought Processes: normal and logical   Mood: steady      Thought Content: normal, no suicidality, no homicidality, delusions, or paranoia   Affect: congruent and appropriate   Orientation: Oriented x3   Attention Span & Concentration: intact   Fund of General Knowledge: intact and appropriate to age and level of education   Judgment & Insight: good     Language  intact       STRENGTHS AND LIABILITIES: Strength: Patient accepts guidance/feedback, Strength: Patient is expressive/articulate., Strength: Patient has reasonable judgment., Strength: Patient is " "stable.    IMPRESSION:   My diagnostic impression is Anxiety disorders; generalized anxiety disorder [F41.1] and Major Depressive Disorder, Recurrent, Mild (F33.0), as evidenced by feeling down, depressed, feeling irritable, difficulty sleeping, poor appetite, worrying too much.     TREATMENT GOALS (per patient):    "To feel more relevant. "How can I be relevant to other people."   To experience calmness and peacefulness. To cope with the estrangement from her only child, Rachel.    Exercise, sleep better, increase enjoyable activities.     Treatment plan:  Target symptoms: recurrent depression, anxiety , adjustment  Why chosen therapy is appropriate versus another modality: relevant to diagnosis, patient responds to this modality, evidence based practice  Outcome monitoring methods: self-report, observation, checklist/rating scale  Therapeutic intervention type: insight oriented psychotherapy    Patient's response to intervention:  The patient's response to intervention is guarded.    Progress toward goals and other mental status changes:  The patient's progress toward goals is good.    Plan: Pt plans to continue individual psychotherapy CBT will be utilized in future individual therapy sessions to increase interaction, insight, and support.     Return to clinic: 2 weeks May 27th          "

## 2024-05-13 NOTE — TELEPHONE ENCOUNTER
Verapamil was stopped after I saw you on 12/05/2022 and added Metoprolol.    I saw you again on 12/28/2023, You continued to take Metoprolol and the tachycardia was controlled.    Advise to continue Metoprolol and hold it if SBP < 100 mmHG.    Advise to f/u with me in 6 to 12 months.

## 2024-05-14 ENCOUNTER — TELEPHONE (OUTPATIENT)
Dept: PRIMARY CARE CLINIC | Facility: CLINIC | Age: 74
End: 2024-05-14
Payer: MEDICARE

## 2024-05-15 ENCOUNTER — LAB VISIT (OUTPATIENT)
Dept: LAB | Facility: HOSPITAL | Age: 74
End: 2024-05-15
Attending: INTERNAL MEDICINE
Payer: MEDICARE

## 2024-05-15 ENCOUNTER — SOCIAL WORK (OUTPATIENT)
Dept: PRIMARY CARE CLINIC | Facility: CLINIC | Age: 74
End: 2024-05-15
Payer: MEDICARE

## 2024-05-15 DIAGNOSIS — M79.10 MYALGIA: Chronic | ICD-10-CM

## 2024-05-15 DIAGNOSIS — E53.8 B12 DEFICIENCY: ICD-10-CM

## 2024-05-15 LAB
CK SERPL-CCNC: 33 U/L (ref 20–180)
VIT B12 SERPL-MCNC: 1633 PG/ML (ref 210–950)

## 2024-05-15 PROCEDURE — 99499 UNLISTED E&M SERVICE: CPT | Mod: S$PBB,,,

## 2024-05-15 PROCEDURE — 83921 ORGANIC ACID SINGLE QUANT: CPT | Performed by: INTERNAL MEDICINE

## 2024-05-15 PROCEDURE — 82550 ASSAY OF CK (CPK): CPT | Performed by: INTERNAL MEDICINE

## 2024-05-15 PROCEDURE — 82607 VITAMIN B-12: CPT | Performed by: INTERNAL MEDICINE

## 2024-05-15 PROCEDURE — 36415 COLL VENOUS BLD VENIPUNCTURE: CPT | Mod: PO | Performed by: INTERNAL MEDICINE

## 2024-05-15 NOTE — PROGRESS NOTES
Discussed patient with Psych PA, Nichole Ferguson. Patient requests a referral to review her medications, possibly make changes or adjustments to her current psychiatric medications. PA is willing to see patient and has sent note to her MA to arrange a virtual appointment.

## 2024-05-16 ENCOUNTER — PATIENT MESSAGE (OUTPATIENT)
Dept: PRIMARY CARE CLINIC | Facility: CLINIC | Age: 74
End: 2024-05-16
Payer: MEDICARE

## 2024-05-16 ENCOUNTER — TELEPHONE (OUTPATIENT)
Dept: PRIMARY CARE CLINIC | Facility: CLINIC | Age: 74
End: 2024-05-16
Payer: MEDICARE

## 2024-05-16 NOTE — TELEPHONE ENCOUNTER
----- Message from Roselia Gillette MD sent at 5/16/2024  8:20 AM CDT -----  Pt has [facility] - if message below not viewed please call w information below:   Good day Ms. Lobo  CPK (measuring for possible muscle breakdown) is normal  B12 level is high - waiting on mma which is another way to test for B12 deficiency - it can take up to a week to result.    Please message or call with any questions or concerns!  Thank you!  Roselia Gillette MD, MPH  [facility]

## 2024-05-22 ENCOUNTER — OFFICE VISIT (OUTPATIENT)
Dept: PSYCHIATRY | Facility: CLINIC | Age: 74
End: 2024-05-22
Payer: MEDICARE

## 2024-05-22 DIAGNOSIS — F41.1 GAD (GENERALIZED ANXIETY DISORDER): Chronic | ICD-10-CM

## 2024-05-22 DIAGNOSIS — F33.41 MDD (MAJOR DEPRESSIVE DISORDER), RECURRENT, IN PARTIAL REMISSION: Primary | Chronic | ICD-10-CM

## 2024-05-22 PROCEDURE — 90792 PSYCH DIAG EVAL W/MED SRVCS: CPT | Mod: 95,,, | Performed by: PHYSICIAN ASSISTANT

## 2024-05-23 ENCOUNTER — TELEPHONE (OUTPATIENT)
Dept: PRIMARY CARE CLINIC | Facility: CLINIC | Age: 74
End: 2024-05-23
Payer: MEDICARE

## 2024-05-23 DIAGNOSIS — C50.411 MALIGNANT NEOPLASM OF UPPER-OUTER QUADRANT OF RIGHT BREAST IN FEMALE, ESTROGEN RECEPTOR POSITIVE: Primary | ICD-10-CM

## 2024-05-23 DIAGNOSIS — Z17.0 MALIGNANT NEOPLASM OF UPPER-OUTER QUADRANT OF RIGHT BREAST IN FEMALE, ESTROGEN RECEPTOR POSITIVE: Primary | ICD-10-CM

## 2024-05-23 RX ORDER — FLUTICASONE PROPIONATE 50 MCG
SPRAY, SUSPENSION (ML) NASAL
Qty: 16 G | Refills: 0 | Status: SHIPPED | OUTPATIENT
Start: 2024-05-23

## 2024-05-23 NOTE — PROGRESS NOTES
[facility]  Ishan Whatley MD, FACS  Cailin Grier NP-MARLEEN      Date of Service: 6/3/2024    Chief Complaint:   Alison Lobo is a 73 y.o. female presenting today for her 6 month follow up due to her breast cancer diagnosis.  She is due for her Mammogram and CXR.  She reports no interval changes on her self-breast examination.     History of Present Illness:   Mrs. Lobo presents on 2023 to establish care.  She has a history of bilateral breast cancer.  Her right breast cancer was diagnosed in 2022 and she is s/p Lumpectomy and Radiation.  She was placed on Arimidex but did not tolerate this.  She then started Tamoxifen and should remain on this until .  Her left breast cancer diagnosis was in 2018. She elected lumpectomy and radiation.  She completed 5 years of Tamoxifen.     Past Medical History:   Diagnosis Date    Cancer     Estrogen receptor positive 2024    Hyperlipidemia     Malignant neoplasm of upper-outer quadrant of right breast in female, estrogen receptor positive 2023    Non-alcoholic fatty liver disease       Past Surgical History:   Procedure Laterality Date    BREAST SURGERY       SECTION      HYSTERECTOMY          Current Outpatient Medications:     atorvastatin (LIPITOR) 20 MG tablet, TAKE 1 TABLET BY MOUTH DAILY, Disp: 90 tablet, Rfl: 1    cholecalciferol, vitamin D3, (VITAMIN D3) 125 mcg (5,000 unit) Tab, Take 5,000 Units by mouth once daily., Disp: , Rfl:     coQ10, ubiquinol, 200 mg Cap, Take 1 capsule by mouth once daily., Disp: , Rfl:     fluticasone propionate (FLONASE) 50 mcg/actuation nasal spray, SHAKE LIQUID AND USE 1 SPRAY(50 MCG) IN EACH NOSTRIL EVERY DAY, Disp: 16 g, Rfl: 0    levothyroxine (SYNTHROID) 25 MCG tablet, TAKE 1 TABLET(25 MCG) BY MOUTH BEFORE BREAKFAST, Disp: 30 tablet, Rfl: 5    meloxicam (MOBIC) 7.5 MG tablet, Take 2 tablets (15 mg total) by mouth once daily., Disp: 180  tablet, Rfl: 1    metFORMIN (GLUCOPHAGE) 500 MG tablet, Take 2 tablets (1,000 mg total) by mouth once daily., Disp: 180 tablet, Rfl: 1    metoprolol succinate (TOPROL-XL) 50 MG 24 hr tablet, TAKE 1 TABLET(50 MG) BY MOUTH EVERY DAY, Disp: 30 tablet, Rfl: 11    montelukast (SINGULAIR) 10 mg tablet, TAKE 1 TABLET(10 MG) BY MOUTH EVERY EVENING, Disp: 90 tablet, Rfl: 1    tamoxifen (NOLVADEX) 20 MG Tab, TAKE 1 TABLET BY MOUTH EVERY DAY, Disp: 90 tablet, Rfl: 3    turmeric (CURCUMIN MISC), 1,400 mg by Misc.(Non-Drug; Combo Route) route once daily., Disp: , Rfl:     venlafaxine (EFFEXOR-XR) 150 MG Cp24, Take 150 mg by mouth 2 (two) times daily. Takes 2 pills QD, Disp: , Rfl:    Review of patient's allergies indicates:  No Known Allergies   Social History     Tobacco Use    Smoking status: Never    Smokeless tobacco: Never   Substance Use Topics    Alcohol use: Never      Family History   Problem Relation Name Age of Onset    Hypertension Mother      Diabetes Mother      Heart failure Mother      Angina Father          Review of Systems   Integumentary:  Negative for color change, rash, mole/lesion, breast mass, breast discharge and breast tenderness.   Breast: Negative for mass and tenderness       Physical Exam   Constitutional: She is cooperative.   HENT:   Head: Normocephalic.   Pulmonary/Chest: She exhibits no mass. Right breast exhibits no inverted nipple, no mass, no nipple discharge, no skin change and no tenderness. Left breast exhibits no inverted nipple, no mass, no nipple discharge, no skin change and no tenderness.   Abdominal: Normal appearance.   Musculoskeletal: Lymphadenopathy:      Upper Body:      Right upper body: No supraclavicular or axillary adenopathy.      Left upper body: No supraclavicular or axillary adenopathy.     Neurological: She is alert.   Skin: No rash noted.          MAMMOGRAM REPORT: FINDINGS: The patient has had bilateral reduction mammoplasty and bilateral breast lumpectomies. A scar  marker on the upper-outer right breast denotes the location of the patient's right lumpectomy surgical scar.There are scattered fibroglandular elements noted. There are no suspicious masses or suspicious calcifications seen to suggest malignancy. Scattered, circumscribed, benign-appearing masses are demonstrated bilaterally. Benign-type calcifications are identified.A slightly irregular masses in the posterior upper-outer right breast and posterior lateral left breast have remained stable and most likely represent postsurgical seromas and/or foci of postsurgical scarring.IMPRESSION: No evidence of malignancy. No significant change when compared to previous exam. Unless otherwise clinically indicated and noting a personal history of bilateral breast cancer, follow-up bilateral mammography is recommended in one year.     CXR REPORT:  FINDINGS: Stable eventration of the right hemidiaphragm. No acute pulmonary opacity or pleural fluid. The cardiomediastinal silhouette is within normal limits. DISH in the thoracic spine. Surgical clips in the right breast.IMPRESSION: No acute cardiopulmonary disease identified.   ASSESSMENT and PLAN OF CARE     1. Malignant neoplasm of upper-outer quadrant of right breast in female, estrogen receptor positive  Assessment & Plan:  Patient is doing well and is being followed according to NCCN Guidelines. She understands that her imaging and exams have remained stable and is comfortable being followed in a conservative fashion. She understands the importance of monthly self-breast examination and knows to report any and all changes as they occur.    NOTE:::We viewed her films together at today's visit.  We discussed the multiple views obtained and the important findings.  Even benign changes were mentioned and her questions were answered.  She knows that she may receive a formal letter or report from the Radiologist.  She is to contact us if she has questions.    She should continue her  Tamoxifen as directed.  We discussed some of the side effects and methods to combat these.  Should she have any issues, she can contact us as needed.    Labs obtained today: CBC, Chem 12, CEA, LDH, CA27/29 - after resulted, these will be compared to her previous values and all abnormal values will be phoned to her for discussion.      Orders:  -     Cancer Antigen 27-29  -     Comprehensive Metabolic Panel  -     Lactate Dehydrogenase  -     CEA  -     CBC Auto Differential    2. Malignant neoplasm of left breast in female, estrogen receptor positive, unspecified site of breast  Assessment & Plan:  Same as above      3. Estrogen receptor positive  Assessment & Plan:  Continue Tamoxifen      Medical Decision Making: It is my impression that this patient suffers all conditions contained in this medical document.  Each of these conditions did affect our plan of care and my medical decision making today.  It is my opinion that the medical decision making concerning this patient was of moderate difficulty based on the aforementioned conditions.  Any further recommendations will be communicated to the patient by me.  I have reviewed and verified her allergies, list of medications, medical and surgical histories, social history, and a pertinent review of symptoms.     Follow up:  6 months and prn    For:  Physical Examination and Ultrasound with Dr. Whatley

## 2024-05-23 NOTE — TELEPHONE ENCOUNTER
Spoke with pt and lab results and instructions.     SJ    ----- Message from Roselia Gillette MD sent at 5/23/2024  7:32 AM CDT -----  Pt has [facility] - if message below not viewed please call w information below:     Mma level is good. Can decrease B12 dose or cut back to taking every other day or M,W, F.    Please message or call with any questions or concerns!  Thank you!  Roselia Gillette MD, MPH  [facility]

## 2024-05-23 NOTE — PROGRESS NOTES
Outpatient Psychiatry Initial Visit (PA-C)    5/22/2024    Alison Lobo, a 73 y.o. female, presenting for initial evaluation visit. Met with patient.    Reason for Encounter: Referral from [facility] . Patient complains of No chief complaint on file.  .  The patient location is: Home at address on record in [place]  The chief complaint leading to consultation is: Evaluation    Visit type: audiovisual    Face to Face time with patient: 60 min  90 minutes of total time spent on the encounter, which includes face to face time and non-face to face time preparing to see the patient (eg, review of tests), Obtaining and/or reviewing separately obtained history, Documenting clinical information in the electronic or other health record, Independently interpreting results (not separately reported) and communicating results to the patient/family/caregiver, or Care coordination (not separately reported).         Each patient to whom he or she provides medical services by telemedicine is:  (1) informed of the relationship between the physician and patient and the respective role of any other health care provider with respect to management of the patient; and (2) notified that he or she may decline to receive medical services by telemedicine and may withdraw from such care at any time.    Notes:     History of Present Illness: Pt presents for evaluation and treatment of depression and anxiety.  She is currently taking venlafaxine  mg BID, for many years.    Pt states that lately she has been feeling down, blah, with low energy, little motivation, and anhedonia.  She states that she has had much worse episodes of depression in the past, and mor is currently able to push through her symptoms.  Denies SI.  Has difficulty falling asleep as well.  She reports hx of MEHNAZ, worrying abut a wide variety of things, with symptoms of worry as well as sensations of fear, with physical manifestations of anxiety present.    Pt reports  her anxiety and depression started in early life, and at age 16 she was put on various tricyclic antidepressants, and then on fluoxetine.  Pt states she felt well on fluoxetine, but after about 10 years it lost efficacy.  Reports little effect of sertraline or citalopram.  Was out on venlafaxine, which worked fairly well.  Also took bupropion in addition to the venlafaxine for a couple of months but stopped due to a drug interaction.      Pt denies a hx of trauma, alexandra, psychosis, substance abuse issues.  She is  and has adult children.  Lives alone.  Grew up in [place] but moved to [place] for quite a while, then to [place], then back to [place] to be near family and friends.  Had a successful career in health policy, and has a PhD.  Feels like she doesn't quite fit in here culturally, but is doing OK.    Physically pt has a hx of tachycardia, asymptomatic.  Takes metoprolol, which she states does slow her heart down to normal range.  Also has hyperlipidemia, hx of breast cancer, osteoarthritis, JADA on CPAP.    Review Of Systems:     Pertinent items are noted in HPI.    Current Evaluation:     Nutritional Screening: Considering the patient's height and weight, medications, medical history and preferences, should a referral be made to the dietitian? no    Constitutional  Vitals:  Most recent vital signs, dated less than 90 days prior to this appointment, were reviewed.    There were no vitals filed for this visit.     General:  unremarkable, age appropriate     Musculoskeletal  Muscle Strength/Tone:  not examined   Gait & Station:  Not observed     Psychiatric  Speech:  no latency; no press   Mood & Affect:  dysthymic  congruent and appropriate   Thought Process:  normal and logical   Associations:  intact   Thought Content:  normal, no suicidality, no homicidality, delusions, or paranoia   Insight:  has awareness of illness   Judgement: behavior is adequate to circumstances   Orientation:  grossly  intact   Memory: intact for content of interview   Language: grossly intact   Attention Span & Concentration:  able to focus   Fund of Knowledge:  intact and appropriate to age and level of education       Relevant Elements of Neurological Exam:  Not observed    Functioning in Relationships:  Spouse/partner:   [place]: [place]  Employers: Retired    Laboratory Data  Lab Visit on 05/15/2024   Component Date Value Ref Range Status    CPK 05/15/2024 33  20 - 180 U/L Final    Vitamin B-12 05/15/2024 1633 (H)  210 - 950 pg/mL Final    Methlymalonic Acid 05/15/2024 0.15  <0.40 umol/L Final         Medications  Outpatient Encounter Medications as of 5/22/2024   Medication Sig Dispense Refill    atorvastatin (LIPITOR) 20 MG tablet TAKE 1 TABLET BY MOUTH DAILY 90 tablet 1    cholecalciferol, vitamin D3, (VITAMIN D3) 125 mcg (5,000 unit) Tab Take 5,000 Units by mouth once daily.      coQ10, ubiquinol, 200 mg Cap Take 1 capsule by mouth once daily.      cyanocobalamin (VITAMIN B-12) 1000 MCG tablet Take 1,000 mcg by mouth once daily.      levothyroxine (SYNTHROID) 25 MCG tablet TAKE 1 TABLET(25 MCG) BY MOUTH BEFORE BREAKFAST 30 tablet 5    meloxicam (MOBIC) 7.5 MG tablet Take 2 tablets (15 mg total) by mouth once daily. 180 tablet 1    metFORMIN (GLUCOPHAGE) 500 MG tablet Take 2 tablets (1,000 mg total) by mouth once daily. 180 tablet 1    metoprolol succinate (TOPROL-XL) 50 MG 24 hr tablet TAKE 1 TABLET(50 MG) BY MOUTH EVERY DAY 30 tablet 11    montelukast (SINGULAIR) 10 mg tablet TAKE 1 TABLET(10 MG) BY MOUTH EVERY EVENING 90 tablet 1    tamoxifen (NOLVADEX) 20 MG Tab TAKE 1 TABLET BY MOUTH EVERY DAY 90 tablet 3    turmeric (CURCUMIN MISC) 1,400 mg by Misc.(Non-Drug; Combo Route) route once daily.      venlafaxine (EFFEXOR-XR) 150 MG Cp24 Take 150 mg by mouth 2 (two) times daily. Takes 2 pills QD      [DISCONTINUED] fluticasone propionate (FLONASE) 50 mcg/actuation nasal spray SHAKE LIQUID AND USE 1 SPRAY(50 MCG) IN EACH  NOSTRIL EVERY DAY 16 g 0    [DISCONTINUED] rifAMpin (RIFADIN) 300 MG capsule Take 300 mg by mouth once daily.       No facility-administered encounter medications on file as of 5/22/2024.           Assessment - Diagnosis - Goals:     Impression: MDD and MEHNAZ, partial response to venlafaxine  mg daily.  Recommended taking both doses in the morning to prevent any drug induced insomnia.  With the low energy, low mood, low motivation, she could be experiencing side effects from the metoprolol.  Will reach out to her PCP to see if we can decrease this safely before we decide to switch vs augment her antidepressant.      ICD-10-CM ICD-9-CM   1. MDD (major depressive disorder), recurrent, in partial remission  F33.41 296.35   2. MEHNAZ (generalized anxiety disorder)  F41.1 300.02     Possibly adjust metoprolol  Continue venlafaxine  mg qAM.  I have explained the risks, benefits, and alternatives of medication treatment in detail. We specifically discussed risks and benefits of medication treatment, including but not limited to, headache, nausea, GI upset, insomnia, agitation and possible increased thoughts of self harm. These thought will likely resolve with regular follow up and treatment. Patient voices understanding and all questions have been answered. Patient agrees to treatment plan and medication trial.    F/u 1m    Strengths and Liabilities: Strength: Patient is intelligent.      Return to Clinic: 1 month    Visit today included increased complexity associated with the care of the episodic problem depression addressed and managing the longitudinal care of the patient due to the serious and/or complex managed problem(s) MDD, MEHNAZ.

## 2024-05-24 LAB — METHYLMALONATE SERPL-SCNC: 0.15 UMOL/L

## 2024-05-27 ENCOUNTER — CLINICAL SUPPORT (OUTPATIENT)
Dept: PRIMARY CARE CLINIC | Facility: CLINIC | Age: 74
End: 2024-05-27
Payer: MEDICARE

## 2024-05-27 DIAGNOSIS — F41.9 ANXIETY: Primary | ICD-10-CM

## 2024-05-27 PROCEDURE — 99499 UNLISTED E&M SERVICE: CPT | Mod: S$PBB,,,

## 2024-05-27 NOTE — PROGRESS NOTES
"Individual Psychotherapy ([facility])  Alison Lobo  2024  DATE:  2024  TYPE OF VISIT:  In person  LENGTH OF SESSION: 45    Therapeutic Intervention: Met with patient for individual psychotherapy.    Chief complaint/reason for encounter: depression, anxiety, sleep, and appetite       Session Content/Presenting Problem:    Has been seen by Psych PA for medication review.  Says the medications are no longer working. Is ready to change the medications. A lot of sadness. Becomes tearful discussing how she is currently feeling. A good friend's son  recently; she attended the  and has been feeling down since then. Finds it difficult to accept life as it is.   Discussed the difficult place she has been in even before this tragic unexpected death. Patient is coming to  with the idea that she is ready to give up on chasing after her daughter and grandchildren. Tired of trying so hard to be a part of their lives.  Says"  "Rejection can be God's protection". Maybe it is best that she and Rachel do not have a relationship.   She notified her son in law Asif that they should not expect her to come out to see the grandkids this year. Blamed health issues.  Discussed this process of grieving after a "divorce" of sorts; normalized her feelings of sadness and anger. Patient continues to process her feelings about living in [place]; she finds it difficult to be here. Came here because of her cancer diagnosis and because of the Covid lockdown. Discussed the idea that the move may have been needed at that time,but maybe she is not destined to stay here. Discussed taking a vacation once she is feeling better. Patient agrees.   Discussed what she is using to cope with her depression: she went to lunch with a friend today; she continues to exercise; she is trying to be healthy. Discussed the nature of depression as an all-encompassing state of being that causes one to turn inward. The selfish aspect of " "depression. Patient says she will continue to focus on getting outside daily; she plans to keep in touch with her friend whose son just . Is hopeful that the more she does for others, the better she will feel. Able to recognize that her thoughts are often the reason for feeling down.  She is able to make note of automatic negative thoughts she experiences. Recognizes the thoughts are often over generalizations and exaggerated. She is able to counteract the thoughts with more positive and realistic thoughts.        Met with patient; have not seen in over a month. Patient reports trying to read the book about parent-child estrangement but found it hard to get through. She may read some more of it. Patient says she has made a decision: "I want to put her (Rachel) aside in some kind of way".  Patient feels she has taken responsibility for actions on her part. Does not see the same from Rachel. Discusses her anger at [facility] for being difficult. Says she is unable to feel optimistic that they will have more of a relationship than they do now.  Patient feels she has invested enough time in trying to go along with Rachel's decisions on what the relationship will and won't be. Says: " I don't want to invest so much emotional energy in this person."  Supported patient in this decision. Explored ways in which she can focus on her own health and happiness. Patient realizes that the estrangement has held her back from enjoying her life.  "I am tired of my heart aching." She no longer wants to spend time thinking about the situation.  She will see one of the grandkids, Godfrey. He requested to come for a visit. She will be here for the grandkids but will no longer make all of the arrangements, fly out to see them, etc.    Encouraged patient to consider what she needs to feel more settled and happy in her life as it is now. Patient has found it difficult to accept that [place] is her home. She will focus on the reason " "she is here - to be close to her cousin, Beatriz Cespedes, and her health issues. Reminded patient of all she has done recently to become more settled here. She has been active in [employer]; she goes to the gym regularly; she spends time with her cousins.   Patient discusses her desire to travel more. Has used her cat as a reason not to travel. She also would like to have a . Explored with her the negative thoughts that have stopped her from allowing herself to hire a  and to plan travel.   Patient also discusses her need for a medication check up, particularly her psych meds. Has not seen a psychiatrist here. She is okay with [facility] notifying Dr. Gillette and requesting a psych referral. Also discussed the possibility of having a virtual visit with Psych PA, Nichole Ferguson, to review meds. Patient is open to these options.         Prior Session:  Patient expresses struggling with the concept of "Existential aloneness"  and being comfortable in this current life phase.  Discussed the life goal of this stage of life according to [place]: Integrity versus despair. Patient expresses regret over the relationship with her daughter.  Patient discusses her earlier life when she was focused on being a "Human doing"; accomplishment and work success was primary. Now,she is trying to increase her emphasis on being a "Human being."   Wants to be able to be proud of herself for being a good person - following her moral compass. Patient says she was always good at walking away whenever things got too hard. Now she is worried that her daughter has done the same to her. Is afraid that Rachel will never forgiver her. This is very painful for patient.   She says "there is no opportunity for repair of that relationship."  Patient acknowledges that in the past ten years she has not apologized to Rachel for any of the things Rachel complained about.  Says Rachel was always an outsider and she was not proud of " "her.  She can acknowledge she is proud of Rachel for being a good mom to her kids.  Encouraged patient to focus on that. This is the opening that Rachel has allowed - the relationship with the grandkids. Encouraged patient to nurture that relationship and not give it up. Patient has a difficult time saying why she loves Rachel. Encouraged patient to look beyond Rachel as a person she does not understand. Focus on loving her just because she is your flesh and blood; that is all the reason that is needed. That is why the separation hurts so much.  It is possible to love someone even though they are difficult to understand or to like. Patient expresses a desire to continue to work on increasing her insight of negative thought patterns and to focus on improving her communication and interactions with others.  Patient will read the book "Parents Who Hurt" to increase understanding of parental estrangement and consider possible ways to mend the relationship.       Current symptoms:  Depression: anhedonia, worthlessness/guilt, hopelessness, and decreased appetite.  Anxiety: excessive worrying and restlessness.  Insomnia: non-restful sleep.  Heidi:  denies.  Psychosis: denies .      Risk parameters:  Patient reports no suicidal ideation  Patient reports no homicidal ideation  Patient reports no self-injurious behavior  Patient reports no violent behavior    MENTAL HEALTH STATUS EXAM  General Appearance:  unremarkable, age appropriate   Speech: normal tone, normal rate, normal pitch, normal volume      Level of Cooperation: cooperative      Thought Processes: normal and logical   Mood: steady      Thought Content: normal, no suicidality, no homicidality, delusions, or paranoia   Affect: congruent and appropriate   Orientation: Oriented x3   Attention Span & Concentration: intact   Fund of General Knowledge: intact and appropriate to age and level of education   Judgment & Insight: good     Language  intact " "      STRENGTHS AND LIABILITIES: Strength: Patient accepts guidance/feedback, Strength: Patient is expressive/articulate., Strength: Patient has reasonable judgment., Strength: Patient is stable.    IMPRESSION:   My diagnostic impression is Anxiety disorders; generalized anxiety disorder [F41.1] and Major Depressive Disorder, Recurrent, Mild (F33.0), as evidenced by feeling down, depressed, feeling irritable, difficulty sleeping, poor appetite, worrying too much.     TREATMENT GOALS (per patient):    "To feel more relevant. "How can I be relevant to other people."   To experience calmness and peacefulness. To cope with the estrangement from her only child, Rachel.    Exercise, sleep better, increase enjoyable activities.     Treatment plan:  Target symptoms: recurrent depression, anxiety , adjustment  Why chosen therapy is appropriate versus another modality: relevant to diagnosis, patient responds to this modality, evidence based practice  Outcome monitoring methods: self-report, observation, checklist/rating scale  Therapeutic intervention type: insight oriented psychotherapy    Patient's response to intervention:  The patient's response to intervention is guarded.    Progress toward goals and other mental status changes:  The patient's progress toward goals is good.    Plan: Pt plans to continue individual psychotherapy CBT will be utilized in future individual therapy sessions to increase interaction, insight, and support.     Return to clinic: 3 weeks June 17th           "

## 2024-05-28 ENCOUNTER — PATIENT MESSAGE (OUTPATIENT)
Dept: PRIMARY CARE CLINIC | Facility: CLINIC | Age: 74
End: 2024-05-28
Payer: MEDICARE

## 2024-05-31 ENCOUNTER — PATIENT MESSAGE (OUTPATIENT)
Dept: SURGERY | Facility: CLINIC | Age: 74
End: 2024-05-31
Payer: MEDICARE

## 2024-06-03 ENCOUNTER — OFFICE VISIT (OUTPATIENT)
Dept: SURGERY | Facility: CLINIC | Age: 74
End: 2024-06-03
Payer: MEDICARE

## 2024-06-03 DIAGNOSIS — Z17.0 ESTROGEN RECEPTOR POSITIVE: ICD-10-CM

## 2024-06-03 DIAGNOSIS — Z17.0 MALIGNANT NEOPLASM OF UPPER-OUTER QUADRANT OF RIGHT BREAST IN FEMALE, ESTROGEN RECEPTOR POSITIVE: Primary | Chronic | ICD-10-CM

## 2024-06-03 DIAGNOSIS — C50.411 MALIGNANT NEOPLASM OF UPPER-OUTER QUADRANT OF RIGHT BREAST IN FEMALE, ESTROGEN RECEPTOR POSITIVE: Primary | Chronic | ICD-10-CM

## 2024-06-03 DIAGNOSIS — Z17.0 MALIGNANT NEOPLASM OF LEFT BREAST IN FEMALE, ESTROGEN RECEPTOR POSITIVE, UNSPECIFIED SITE OF BREAST: ICD-10-CM

## 2024-06-03 DIAGNOSIS — C50.912 MALIGNANT NEOPLASM OF LEFT BREAST IN FEMALE, ESTROGEN RECEPTOR POSITIVE, UNSPECIFIED SITE OF BREAST: ICD-10-CM

## 2024-06-03 LAB
ALBUMIN SERPL BCP-MCNC: 3.5 G/DL (ref 3.5–5.2)
ALP SERPL-CCNC: 76 U/L (ref 55–135)
ALT SERPL W/O P-5'-P-CCNC: 36 U/L (ref 10–44)
ANION GAP SERPL CALC-SCNC: 12 MMOL/L (ref 8–16)
AST SERPL-CCNC: 32 U/L (ref 10–40)
BASOPHILS # BLD AUTO: 0.07 K/UL (ref 0–0.2)
BASOPHILS NFR BLD: 1 % (ref 0–1.9)
BILIRUB SERPL-MCNC: 0.3 MG/DL (ref 0.1–1)
BUN SERPL-MCNC: 17 MG/DL (ref 8–23)
CALCIUM SERPL-MCNC: 9.5 MG/DL (ref 8.7–10.5)
CEA SERPL-MCNC: 2.5 NG/ML (ref 0–5)
CHLORIDE SERPL-SCNC: 104 MMOL/L (ref 95–110)
CO2 SERPL-SCNC: 23 MMOL/L (ref 23–29)
CREAT SERPL-MCNC: 0.8 MG/DL (ref 0.5–1.4)
DIFFERENTIAL METHOD BLD: ABNORMAL
EOSINOPHIL # BLD AUTO: 0.2 K/UL (ref 0–0.5)
EOSINOPHIL NFR BLD: 2.5 % (ref 0–8)
ERYTHROCYTE [DISTWIDTH] IN BLOOD BY AUTOMATED COUNT: 12.7 % (ref 11.5–14.5)
EST. GFR  (NO RACE VARIABLE): >60 ML/MIN/1.73 M^2
GLUCOSE SERPL-MCNC: 126 MG/DL (ref 70–110)
HCT VFR BLD AUTO: 40.8 % (ref 37–48.5)
HGB BLD-MCNC: 13.5 G/DL (ref 12–16)
IMM GRANULOCYTES # BLD AUTO: 0.02 K/UL (ref 0–0.04)
IMM GRANULOCYTES NFR BLD AUTO: 0.3 % (ref 0–0.5)
LDH SERPL L TO P-CCNC: 182 U/L (ref 110–260)
LYMPHOCYTES # BLD AUTO: 1.8 K/UL (ref 1–4.8)
LYMPHOCYTES NFR BLD: 24.6 % (ref 18–48)
MCH RBC QN AUTO: 32.8 PG (ref 27–31)
MCHC RBC AUTO-ENTMCNC: 33.1 G/DL (ref 32–36)
MCV RBC AUTO: 99 FL (ref 82–98)
MONOCYTES # BLD AUTO: 0.7 K/UL (ref 0.3–1)
MONOCYTES NFR BLD: 9.5 % (ref 4–15)
NEUTROPHILS # BLD AUTO: 4.4 K/UL (ref 1.8–7.7)
NEUTROPHILS NFR BLD: 62.1 % (ref 38–73)
NRBC BLD-RTO: 0 /100 WBC
PLATELET # BLD AUTO: 283 K/UL (ref 150–450)
PMV BLD AUTO: 11.5 FL (ref 9.2–12.9)
POTASSIUM SERPL-SCNC: 4.4 MMOL/L (ref 3.5–5.1)
PROT SERPL-MCNC: 6.7 G/DL (ref 6–8.4)
RBC # BLD AUTO: 4.11 M/UL (ref 4–5.4)
SODIUM SERPL-SCNC: 139 MMOL/L (ref 136–145)
WBC # BLD AUTO: 7.14 K/UL (ref 3.9–12.7)

## 2024-06-03 PROCEDURE — 83615 LACTATE (LD) (LDH) ENZYME: CPT | Performed by: NURSE PRACTITIONER

## 2024-06-03 PROCEDURE — 85025 COMPLETE CBC W/AUTO DIFF WBC: CPT | Performed by: NURSE PRACTITIONER

## 2024-06-03 PROCEDURE — 99214 OFFICE O/P EST MOD 30 MIN: CPT | Mod: S$PBB,,, | Performed by: NURSE PRACTITIONER

## 2024-06-03 PROCEDURE — 99213 OFFICE O/P EST LOW 20 MIN: CPT | Mod: PBBFAC,PN | Performed by: NURSE PRACTITIONER

## 2024-06-03 PROCEDURE — 80053 COMPREHEN METABOLIC PANEL: CPT | Performed by: NURSE PRACTITIONER

## 2024-06-03 PROCEDURE — 99999 PR PBB SHADOW E&M-EST. PATIENT-LVL III: CPT | Mod: PBBFAC,,, | Performed by: NURSE PRACTITIONER

## 2024-06-03 PROCEDURE — 82378 CARCINOEMBRYONIC ANTIGEN: CPT | Performed by: NURSE PRACTITIONER

## 2024-06-04 ENCOUNTER — HOSPITAL ENCOUNTER (OUTPATIENT)
Dept: RADIOLOGY | Facility: HOSPITAL | Age: 74
Discharge: HOME | End: 2024-06-04
Attending: NURSE PRACTITIONER
Payer: MEDICARE

## 2024-06-04 DIAGNOSIS — K76.0 NAFLD (NONALCOHOLIC FATTY LIVER DISEASE): ICD-10-CM

## 2024-06-04 DIAGNOSIS — K74.00 HEPATIC FIBROSIS DUE TO NONALCOHOLIC FATTY LIVER DISEASE: ICD-10-CM

## 2024-06-04 DIAGNOSIS — K76.0 HEPATIC FIBROSIS DUE TO NONALCOHOLIC FATTY LIVER DISEASE: ICD-10-CM

## 2024-06-04 PROCEDURE — 76705 ECHO EXAM OF ABDOMEN: CPT | Mod: TC

## 2024-06-04 PROCEDURE — 76705 ECHO EXAM OF ABDOMEN: CPT | Mod: 26,,, | Performed by: RADIOLOGY

## 2024-06-05 ENCOUNTER — PATIENT MESSAGE (OUTPATIENT)
Dept: PSYCHIATRY | Facility: CLINIC | Age: 74
End: 2024-06-05

## 2024-06-05 ENCOUNTER — OFFICE VISIT (OUTPATIENT)
Dept: PSYCHIATRY | Facility: CLINIC | Age: 74
End: 2024-06-05
Payer: MEDICARE

## 2024-06-05 ENCOUNTER — PATIENT MESSAGE (OUTPATIENT)
Dept: PRIMARY CARE CLINIC | Facility: CLINIC | Age: 74
End: 2024-06-05
Payer: MEDICARE

## 2024-06-05 DIAGNOSIS — F41.1 GAD (GENERALIZED ANXIETY DISORDER): ICD-10-CM

## 2024-06-05 DIAGNOSIS — F33.41 MDD (MAJOR DEPRESSIVE DISORDER), RECURRENT, IN PARTIAL REMISSION: Primary | ICD-10-CM

## 2024-06-05 LAB — CANCER AG27-29 SERPL-ACNC: 25.1 U/ML

## 2024-06-05 PROCEDURE — G2211 COMPLEX E/M VISIT ADD ON: HCPCS | Mod: 95,,, | Performed by: PHYSICIAN ASSISTANT

## 2024-06-05 PROCEDURE — 99213 OFFICE O/P EST LOW 20 MIN: CPT | Mod: 95,,, | Performed by: PHYSICIAN ASSISTANT

## 2024-06-05 NOTE — PROGRESS NOTES
Outpatient Psychiatry Follow-Up Visit ([facility])    6/5/2024    Clinical Status of Patient:  Outpatient (Ambulatory)    Chief Complaint:  Alison Lobo is a 73 y.o. female who presents today for follow-up of depression and anxiety.  Met with patient.    The patient location is: Home at address on record in [place]  The chief complaint leading to consultation is: F/u depression and anxiety    Visit type: audiovisual    Face to Face time with patient: 12 min  20 minutes of total time spent on the encounter, which includes face to face time and non-face to face time preparing to see the patient (eg, review of tests), Obtaining and/or reviewing separately obtained history, Documenting clinical information in the electronic or other health record, Independently interpreting results (not separately reported) and communicating results to the patient/family/caregiver, or Care coordination (not separately reported).         Each patient to whom he or she provides medical services by telemedicine is:  (1) informed of the relationship between the physician and patient and the respective role of any other health care provider with respect to management of the patient; and (2) notified that he or she may decline to receive medical services by telemedicine and may withdraw from such care at any time.    Notes:     Interval History and Content of Current Session:  Interim Events/Subjective Report/Content of Current Session: Pt presents for follow up of MDD and MEHNAZ.  She is currently taking venlafaxine  mg daily.  She recently decreased her metoprolol XR from 50 mg daily to 25 mg daily targeting fatigue and dysthymia.  She states that she had some energy to clean out her garage, but is not sure if that is due to the decrease in metoprolol.  Does not report palpitations.  Also just started taking tamoxifen at night.  Would like to give these changes more time to take effect prior to making any further changes.      Review of  Systems   PSYCHIATRIC: Pertinant items are noted in the narrative.    Past Medical, Family and Social History: The patient's past medical, family and social history have been reviewed and updated as appropriate within the electronic medical record - see encounter notes.    Compliance: yes    Side effects: None    Risk Parameters:  Patient reports no suicidal ideation  Patient reports no homicidal ideation  Patient reports no self-injurious behavior  Patient reports no violent behavior    Exam (detailed: at least 9 elements; comprehensive: all 15 elements)   Constitutional  Vitals:  Most recent vital signs, dated less than 90 days prior to this appointment, were reviewed.   There were no vitals filed for this visit.     General:  unremarkable, age appropriate, well dressed, neatly groomed     Musculoskeletal  Muscle Strength/Tone:  not examined   Gait & Station:  Not observed     Psychiatric  Speech:  no latency; no press   Mood & Affect:  irritable  congruent and appropriate   Thought Process:  normal and logical   Associations:  intact   Thought Content:  normal, no suicidality, no homicidality, delusions, or paranoia   Insight:  has awareness of illness   Judgement: behavior is adequate to circumstances   Orientation:  grossly intact   Memory: intact for content of interview   Language: grossly intact   Attention Span & Concentration:  able to focus   Fund of Knowledge:  intact and appropriate to age and level of education     Assessment and Diagnosis   Status/Progress: Based on the examination today, the patient's problem(s) is/are inadequately controlled.  New problems have not been presented today.   Lack of compliance are not complicating management of the primary condition.  There are no active rule-out diagnoses for this patient at this time.     General Impression: MDD and MEHNAZ, still unsure of full response to decrease in metoprolol as it has only been a few days.  Will also evaluation for increased energy  taking tamoxifen at bedtime per breast CA team.      ICD-10-CM ICD-9-CM   1. MDD (major depressive disorder), recurrent, in partial remission  F33.41 296.35   2. MEHNAZ (generalized anxiety disorder)  F41.1 300.02     Continue venlafaxine  mg qAM  Continue metoprolol at lower dose of 25 mg daily  F/u 6 weeks    Intervention/Counseling/Treatment Plan   Medication Management: The risks and benefits of medication were discussed with the patient.      Return to Clinic: 6 weeks    Visit today included increased complexity associated with the care of the episodic problem fatigue addressed and managing the longitudinal care of the patient due to the serious and/or complex managed problem(s) MDD, MEHNAZ.

## 2024-06-17 ENCOUNTER — CLINICAL SUPPORT (OUTPATIENT)
Dept: PRIMARY CARE CLINIC | Facility: CLINIC | Age: 74
End: 2024-06-17
Payer: MEDICARE

## 2024-06-17 DIAGNOSIS — F41.9 ANXIETY: Primary | ICD-10-CM

## 2024-06-17 PROCEDURE — 99499 UNLISTED E&M SERVICE: CPT | Mod: S$PBB,,,

## 2024-06-17 NOTE — PROGRESS NOTES
Individual Psychotherapy ([facility])  Alison Lobo  2024  DATE:  2024  TYPE OF VISIT:  In person  LENGTH OF SESSION: 45    Therapeutic Intervention: Met with patient for individual psychotherapy.  Busy.   Chief complaint/reason for encounter: depression, anxiety, sleep, and appetite     Met with patient. She reports that she is feeling much better since adjusting her medication. She feels more at peace with the situation with her daughter. Has made the decision to accept the situation as it is and focus more on living her own life; she plans to reach out less to the grandkids.  Her grandson is coming for a visit soon and she is looking forward to that.    Patient discusses her concern regarding her cousin Beatriz and Beatriz's , Tom. Says Tom is a heavy drinker and is also very controlling. Beatriz was happy while he was out of town and was openly unhappy when he returned from his trip early.   Patient discusses her desire to be a continued support for Beatriz Cespedes.    Patient reports feeling motivated; she has decided to clear out her garage and create an art studio space.  She is sleeping better with the use of the CPAP machine.  Has not been going to the gym much.     Working on sleeping, using cpap. Not going to the gym.   Patient is feeling good right now with the decisions she has made and the plans she is working on for herself. She has been able to read a bit more of the When Parents Hurt book and has found it helpful. Discussed where she is with therapy; patient requests to continue with sessions but is agreeable to having less frequent sessions.  Agreed to another session in 2 months.     Prior Session:  Has been seen by Psych PA for medication review.  Says the medications are no longer working. Is ready to change the medications. A lot of sadness. Becomes tearful discussing how she is currently feeling. A good friend's son  recently; she attended the  and has been feeling down since then.  "Finds it difficult to accept life as it is.   Discussed the difficult place she has been in even before this tragic unexpected death. Patient is coming to  with the idea that she is ready to give up on chasing after her daughter and grandchildren. Tired of trying so hard to be a part of their lives.  Says"  "Rejection can be God's protection". Maybe it is best that she and Rachel do not have a relationship.   She notified her son in law Asif that they should not expect her to come out to see the grandkids this year. Blamed health issues.  Discussed this process of grieving after a "divorce" of sorts; normalized her feelings of sadness and anger. Patient continues to process her feelings about living in [place]; she finds it difficult to be here. Came here because of her cancer diagnosis and because of the Covid lockdown. Discussed the idea that the move may have been needed at that time,but maybe she is not destined to stay here. Discussed taking a vacation once she is feeling better. Patient agrees.   Discussed what she is using to cope with her depression: she went to lunch with a friend today; she continues to exercise; she is trying to be healthy. Discussed the nature of depression as an all-encompassing state of being that causes one to turn inward. The selfish aspect of depression. Patient says she will continue to focus on getting outside daily; she plans to keep in touch with her friend whose son just . Is hopeful that the more she does for others, the better she will feel. Able to recognize that her thoughts are often the reason for feeling down.  She is able to make note of automatic negative thoughts she experiences. Recognizes the thoughts are often over generalizations and exaggerated. She is able to counteract the thoughts with more positive and realistic thoughts.      Met with patient; have not seen in over a month. Patient reports trying to read the book about parent-child estrangement " "but found it hard to get through. She may read some more of it. Patient says she has made a decision: "I want to put her (Rachel) aside in some kind of way".  Patient feels she has taken responsibility for actions on her part. Does not see the same from Rachel. Discusses her anger at Rachel for being difficult. Says she is unable to feel optimistic that they will have more of a relationship than they do now.  Patient feels she has invested enough time in trying to go along with Rachel's decisions on what the relationship will and won't be. Says: " I don't want to invest so much emotional energy in this person."  Supported patient in this decision. Explored ways in which she can focus on her own health and happiness. Patient realizes that the estrangement has held her back from enjoying her life.  "I am tired of my heart aching." She no longer wants to spend time thinking about the situation.  She will see one of the grandkids, Godfrey. He requested to come for a visit. She will be here for the grandkids but will no longer make all of the arrangements, fly out to see them, etc.    Encouraged patient to consider what she needs to feel more settled and happy in her life as it is now. Patient has found it difficult to accept that [place] is her home. She will focus on the reason she is here - to be close to her cousin, Beatriz Cespedes, and her health issues. Reminded patient of all she has done recently to become more settled here. She has been active in [employer]; she goes to the gym regularly; she spends time with her cousins.   Patient discusses her desire to travel more. Has used her cat as a reason not to travel. She also would like to have a . Explored with her the negative thoughts that have stopped her from allowing herself to hire a  and to plan travel.   Patient also discusses her need for a medication check up, particularly her psych meds. Has not seen a psychiatrist here. She is " "okay with [facility] notifying Dr. Gillette and requesting a psych referral. Also discussed the possibility of having a virtual visit with Psych PA, Nichole Ferguson, to review meds. Patient is open to these options.           Current symptoms:  Depression: anhedonia, worthlessness/guilt, hopelessness, and decreased appetite.  Anxiety: excessive worrying and restlessness.  Insomnia: non-restful sleep.  Heidi:  denies.  Psychosis: denies .      Risk parameters:  Patient reports no suicidal ideation  Patient reports no homicidal ideation  Patient reports no self-injurious behavior  Patient reports no violent behavior    MENTAL HEALTH STATUS EXAM  General Appearance:  unremarkable, age appropriate   Speech: normal tone, normal rate, normal pitch, normal volume      Level of Cooperation: cooperative      Thought Processes: normal and logical   Mood: steady      Thought Content: normal, no suicidality, no homicidality, delusions, or paranoia   Affect: congruent and appropriate   Orientation: Oriented x3   Attention Span & Concentration: intact   Fund of General Knowledge: intact and appropriate to age and level of education   Judgment & Insight: good     Language  intact       STRENGTHS AND LIABILITIES: Strength: Patient accepts guidance/feedback, Strength: Patient is expressive/articulate., Strength: Patient has reasonable judgment., Strength: Patient is stable.    IMPRESSION:   My diagnostic impression is Anxiety disorders; generalized anxiety disorder [F41.1] and Major Depressive Disorder, Recurrent, Mild (F33.0), as evidenced by feeling down, depressed, feeling irritable, difficulty sleeping, poor appetite, worrying too much.     TREATMENT GOALS (per patient):    "To feel more relevant. "How can I be relevant to other people."   To experience calmness and peacefulness. To cope with the estrangement from her only child, Rachel.    Exercise, sleep better, increase enjoyable activities.     Treatment plan:  Target " symptoms: recurrent depression, anxiety , adjustment  Why chosen therapy is appropriate versus another modality: relevant to diagnosis, patient responds to this modality, evidence based practice  Outcome monitoring methods: self-report, observation, checklist/rating scale  Therapeutic intervention type: insight oriented psychotherapy    Patient's response to intervention:  The patient's response to intervention is guarded.    Progress toward goals and other mental status changes:  The patient's progress toward goals is good.    Plan: Pt plans to continue individual psychotherapy CBT will be utilized in future individual therapy sessions to increase interaction, insight, and support.     Return to clinic: 2 months  August 19th

## 2024-06-24 ENCOUNTER — OFFICE VISIT (OUTPATIENT)
Dept: PRIMARY CARE CLINIC | Facility: CLINIC | Age: 74
End: 2024-06-24
Payer: MEDICARE

## 2024-06-24 VITALS
BODY MASS INDEX: 33.32 KG/M2 | HEIGHT: 63 IN | WEIGHT: 188.06 LBS | DIASTOLIC BLOOD PRESSURE: 62 MMHG | OXYGEN SATURATION: 96 % | HEART RATE: 106 BPM | TEMPERATURE: 97 F | SYSTOLIC BLOOD PRESSURE: 126 MMHG

## 2024-06-24 DIAGNOSIS — E78.49 OTHER HYPERLIPIDEMIA: Primary | Chronic | ICD-10-CM

## 2024-06-24 DIAGNOSIS — E53.8 FOLATE DEFICIENCY: ICD-10-CM

## 2024-06-24 DIAGNOSIS — R73.03 PREDIABETES: Chronic | ICD-10-CM

## 2024-06-24 DIAGNOSIS — R00.0 SINUS TACHYCARDIA: Chronic | ICD-10-CM

## 2024-06-24 DIAGNOSIS — E67.3 HYPERVITAMINOSIS D: ICD-10-CM
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
## 2024-06-24 PROBLEM — L25.9 CONTACT DERMATITIS: Status: RESOLVED | Noted: 2024-01-09 | Resolved: 2024-06-24

## 2024-06-24 PROBLEM — S40.862A INSECT BITE OF LEFT UPPER ARM: Status: RESOLVED | Noted: 2024-01-09 | Resolved: 2024-06-24

## 2024-06-24 PROCEDURE — 99999 PR PBB SHADOW E&M-EST. PATIENT-LVL IV: CPT | Mod: PBBFAC,,, | Performed by: INTERNAL MEDICINE

## 2024-06-24 PROCEDURE — 99214 OFFICE O/P EST MOD 30 MIN: CPT | Mod: PBBFAC,PN | Performed by: INTERNAL MEDICINE

## 2024-06-24 PROCEDURE — 99215 OFFICE O/P EST HI 40 MIN: CPT | Mod: S$PBB,,, | Performed by: INTERNAL MEDICINE

## 2024-06-24 NOTE — PROGRESS NOTES
Alison Lobo  06/24/2024  [id number]    Roselia Gillette MD  Patient Care Team:  Roselia Gillette MD as PCP - General (Internal Medicine)  Clarissa Stephenson NP as Nurse Practitioner (Family Medicine)  Ishan Whatley MD as Consulting Physician (Breast Surgery)    Visit Type: Follow-up    Chief Complaint:  Chief Complaint   Patient presents with    Follow-up     Pt is here for three month follow up.      History of Present Illness: Ms. Alison oLbo is a 73 year old female here for scheduled f/u.      Chronic medical issues include prediabetes, B Chun's neuroma/foot pain, JADA (CPAP), obesity, R breast cancer 2002, h/o L breast cancer 2018 (both small invasive ductal carcinoma), recurrent MDD, MEHNAZ, HLD, hypothyroid, recent diagnosis hepatic fibrosis due to NAFLD.     Completed PT - found very helpful - vertigo symptoms pretty much resolved  Using intra-nasal steroid spray (not intra-nasal saline water flush though)  Sense of taste has improved some   Reports sleeping well now  Involved in some new projects and signed up for some art classes    Has noted not decreasing metoprolol dose significantly affected mood or energy level and heart rate back up again since decreased dose - she is in agreement w retrying prior metoprolol dose of 50 mg daily and will monitor to see if notes any affect on mood or energy level    PHQ-4 Score: 4     From LOV w me 1/19/24  Reports vertigo symptoms have been worse usual past week   3 falls and 1 near fall this week:    Tues slipped on ice hit forehead, nose, L cheek  Also Tues? tripped over rake in garage fell onto R knee, scraped forehead  Wed tripped over shoes in house fell onto R side  Slid on water in bathroom today but caught herself   Vertigo most noted on rising in am and with head movements  She does reports some numbness in feet but attributes this to Chun's neuroma  Slight nausea w the vertigo  Still w decreased sense of taste overall and sometimes things  she usually likes, taste bad  Flea bites healing  Has been working w PT and w [facility] LCSW.  Abstaining from alcohol per Hepatology rec's    Recent appointments:   6/17/24 [facility] Gladis LCSW  6/05/24 Psych [facility] (virtual)  venlafaxine 300 mg, decreased metoprolol to 25 mg  6/03/24 Breast Surg [facility] 6 mos f/u R breast CA dxd 2/2022  s/p lumpectomy/radiation tamoxifen to 2028   CXR/mammogram stable    3/19/24 [facility] Seema EAWV    Upcoming appointments:  Future Appointments       Date Provider Specialty Appt Notes    7/10/2024 Nichole Ferguson DMSc, PA-C Psychiatry  Arrive at: Telehealth 6w f/u    7/24/2024  Primary Care Fasting Labs    8/19/2024 Madison Griffith LCSW Primary Care LCSW Visit    9/24/2024 Roselia Gillette MD Primary Care Three month f/u    12/17/2024 Roland Marie MD Hepatology  Arrive at: Telehealth NAFLD (nonalcoholic fatty liver disease) [K76.0]  Hepatic fibrosis due to nonalcoholic fatty liver disease [K74.00, K76.0]    12/18/2024 Ishan Whatley MD Breast Surgery Follow up in about 6 months (around 12/3/2024), or if symptoms worsen or fail to improve, for PE, US (S) w/ Dr. Whatley.    12/31/2024 Osmani Reyes MD Cardiology 1 year           The following were reviewed: Active problem list, medication list, allergies, family history, social history, and Health Maintenance.     Medications have been reviewed and reconciled with patient at visit today.    Review of Systems   See HPI above    Exam: sat 96%  Vitals:    06/24/24 1320   BP: 126/62   Pulse: 106   Temp: 97.4 °F (36.3 °C)     Weight: 85.3 kg (188 lb 0.8 oz)   Body mass index is 33.31 kg/m².    BP Readings from Last 3 Encounters:   06/24/24 126/62   03/19/24 122/60   01/19/24 112/64      Wt Readings from Last 3 Encounters:   06/24/24 1320 85.3 kg (188 lb 0.8 oz)   01/25/24 1328 81.6 kg (179 lb 14.3 oz)   01/19/24 1455 81.6 kg (179 lb 14.3 oz)      Physical Exam  Vitals reviewed.   Constitutional:        General: She is not in acute distress.     Appearance: Normal appearance. She is obese.   HENT:      Head: Normocephalic and atraumatic.      Right Ear: Tympanic membrane, ear canal and external ear normal.      Left Ear: Tympanic membrane, ear canal and external ear normal.      Nose: Nose normal.      Mouth/Throat:      Mouth: Mucous membranes are moist.      Pharynx: Oropharynx is clear.   Eyes:      General: No scleral icterus.     Extraocular Movements: Extraocular movements intact.      Conjunctiva/sclera: Conjunctivae normal.   Neck:      Vascular: No carotid bruit.   Cardiovascular:      Rate and Rhythm: Regular rhythm. Tachycardia present.      Heart sounds: No murmur heard.  Pulmonary:      Effort: Pulmonary effort is normal. No respiratory distress.      Breath sounds: No wheezing, rhonchi or rales.   Abdominal:      General: Bowel sounds are normal. There is no distension.      Palpations: Abdomen is soft.      Tenderness: There is no abdominal tenderness. There is no guarding.   Musculoskeletal:      Right lower leg: No edema.      Left lower leg: No edema.   Lymphadenopathy:      Cervical: No cervical adenopathy.   Skin:     General: Skin is warm and dry.   Neurological:      Mental Status: She is alert and oriented to person, place, and time. Mental status is at baseline.      Gait: Gait normal.   Psychiatric:         Mood and Affect: Mood normal.         Behavior: Behavior normal.         Thought Content: Thought content normal.         Judgment: Judgment normal.          Laboratory Reviewed  Lab Results   Component Value Date    WBC 5.69 06/04/2024    HGB 13.5 06/04/2024    HCT 41.2 06/04/2024     06/04/2024    MCV 99 (H) 06/04/2024    CHOL 147 01/18/2024    TRIG 89 01/18/2024    HDL 43 01/18/2024    LDLCALC 86.2 01/18/2024    ALT 36 06/04/2024    AST 30 06/04/2024     06/04/2024    K 4.3 06/04/2024     06/04/2024    CREATININE 0.7 06/04/2024    BUN 21 06/04/2024    CO2 27  "06/04/2024    MG 2.2 01/18/2024    TSH 2.763 01/18/2024    FREET4 0.90 01/18/2024    INR 1.1 06/04/2024    HGBA1C 5.5 01/18/2024    CRP 1.1 12/15/2022     Lab Results   Component Value Date    CALCIUM 9.8 06/04/2024    PHOS 3.3 11/07/2022      Lab Results   Component Value Date    SGRVFNPE82 1633 (H) 05/15/2024     Lab Results   Component Value Date    FOLATE 6.4 01/19/2024    No results found for: "UIBC", "IRON", "TRANS", "TRANSFERRIN", "TIBC", "LABIRON", "FESATURATED"   Lab Results   Component Value Date    EGFRNORACEVR >60.0 06/04/2024    ALBUMIN 3.4 (L) 06/04/2024     Lab Results   Component Value Date    PAAIJFYY46JU 58 11/07/2022      5/15/24 mma 0.15 wnl CK 33 wnl    Assessment:   74 y.o. female with multiple co-morbid illnesses here for continued follow up of medical problems.      The primary encounter diagnosis was Other hyperlipidemia. Diagnoses of Prediabetes, Folate deficiency, and Hypervitaminosis D were also pertinent to this visit.      Plan:   1. Other hyperlipidemia  -     Lipid Panel; Future; Expected date: 07/29/2024    2. Prediabetes  -     Hemoglobin A1C; Future; Expected date: 07/29/2024    3. Folate deficiency  -     FOLATE; Future; Expected date: 07/29/2024    4. Hypervitaminosis D  -     Vitamin D; Future; Expected date: 07/29/2024         Health Maintenance         Date Due Completion Date    COVID-19 Vaccine (8 - 2023-24 season) 03/02/2024 1/6/2024    Hemoglobin A1c (Prediabetes) 01/18/2025 1/18/2024    DEXA Scan 05/12/2027 5/12/2022    Lipid Panel 01/18/2029 1/18/2024    TETANUS VACCINE 05/09/2029 5/9/2019          -Patient's lab results were reviewed and discussed with patient  -Treatment options and alternatives were discussed with the patient. Patient expressed understanding. Patient was given the opportunity to ask questions and be an active participant in their medical care. Patient had no further questions or concerns at this time.     -Patient is an overall moderate risk for " health complications from their medical conditions.     Follow up: Follow up in 3 months (on 9/24/2024) for Follow Up w me.    Care Plan/Goals: Reviewed Yes   Goals         exercise (pt-stated)       Achievable - within patient's control: Yes    Difficulties Identified: don't like exercising indoors - getting started    Plan for Overcoming difficulties: set intention for 3x week chair yoga or cardio at [facility] or [facility] starting Thursd ya - plan to log so can monitor persistence    Timeframe for completion: 3 mos                    After visit summary printed and given to patient upon discharge.  Patient goals and care plan are included in After visit summary.    TOTAL TIME evaluating and managing this patient for this encounter was 62 minutes. This time was spent personally by me on some of the following activities: review of patient's past medical history, assessing age-appropriate health maintenance needs, review of any interval history, review and interpretation of lab results, review and interpretation of imaging test results, review and interpretation of cardiology test results, reviewing consulting specialist notes, obtaining history from the patient and family, examination of the patient, medication reconciliation, managing and/or ordering prescription medications, ordering imaging tests, ordering referral to subspecialty provider(s), educating patient and answering their questions about diagnosis, treatment plan, and goals of treatment, discussing planned follow-up and final documentation of the visit. This time was exclusive of any separately billable procedures for this patient and exclusive of time spent treating any other patients.

## 2024-06-24 NOTE — PATIENT INSTRUCTIONS
If you are feeling unwell, we'd like to be the first ones to know here at [facility]! Please give us a call. Same day appointments are our top priority to keep you well and out of the emergency rooms and hospitals. Call [phone] for our direct line. After hours advice is always available. Please call [phone] after hours to speak to the on-call team.      Try going back to metolprolol 50 mg - note if any change in mood or energy level    Since no longer taking B12 recommend adding good quality B-complex supplement

## 2024-06-24 NOTE — ASSESSMENT & PLAN NOTE
Heart rate back up w decrease of metoprolol - plan to go back to higher dose - will monitor for change in mood and/or energy level

## 2024-07-10 ENCOUNTER — TELEPHONE (OUTPATIENT)
Dept: PRIMARY CARE CLINIC | Facility: CLINIC | Age: 74
End: 2024-07-10
Payer: MEDICARE

## 2024-07-10 ENCOUNTER — DOCUMENTATION ONLY (OUTPATIENT)
Dept: PRIMARY CARE CLINIC | Facility: CLINIC | Age: 74
End: 2024-07-10
Payer: MEDICARE

## 2024-07-10 ENCOUNTER — OFFICE VISIT (OUTPATIENT)
Dept: PSYCHIATRY | Facility: CLINIC | Age: 74
End: 2024-07-10
Payer: MEDICARE

## 2024-07-10 ENCOUNTER — PATIENT MESSAGE (OUTPATIENT)
Dept: PRIMARY CARE CLINIC | Facility: CLINIC | Age: 74
End: 2024-07-10
Payer: MEDICARE

## 2024-07-10 DIAGNOSIS — F41.1 GAD (GENERALIZED ANXIETY DISORDER): ICD-10-CM

## 2024-07-10 DIAGNOSIS — F33.41 MDD (MAJOR DEPRESSIVE DISORDER), RECURRENT, IN PARTIAL REMISSION: Primary | ICD-10-CM

## 2024-07-10 PROCEDURE — G2211 COMPLEX E/M VISIT ADD ON: HCPCS | Mod: 95,,, | Performed by: PHYSICIAN ASSISTANT

## 2024-07-10 PROCEDURE — 99214 OFFICE O/P EST MOD 30 MIN: CPT | Mod: 95,,, | Performed by: PHYSICIAN ASSISTANT

## 2024-07-10 RX ORDER — HYDROXYZINE HYDROCHLORIDE 25 MG/1
25 TABLET, FILM COATED ORAL 3 TIMES DAILY PRN
Qty: 30 TABLET | Refills: 2 | Status: SHIPPED | OUTPATIENT
Start: 2024-07-10 | End: 2024-10-08

## 2024-07-10 RX ORDER — ARIPIPRAZOLE 2 MG/1
2 TABLET ORAL DAILY
Qty: 90 TABLET | Refills: 0 | Status: SHIPPED | OUTPATIENT
Start: 2024-07-10 | End: 2024-10-08

## 2024-07-10 NOTE — PROGRESS NOTES
Outpatient Psychiatry Follow-Up Visit ([facility])    7/10/2024    Clinical Status of Patient:  Outpatient (Ambulatory)    Chief Complaint:  Alison Lobo is a 74 y.o. female who presents today for follow-up of depression and anxiety.  Met with patient.    The patient location is: Home at address on record in [place]  The chief complaint leading to consultation is: F/u depression and anxiety    Visit type: audiovisual    Face to Face time with patient: 12 min  20 minutes of total time spent on the encounter, which includes face to face time and non-face to face time preparing to see the patient (eg, review of tests), Obtaining and/or reviewing separately obtained history, Documenting clinical information in the electronic or other health record, Independently interpreting results (not separately reported) and communicating results to the patient/family/caregiver, or Care coordination (not separately reported).         Each patient to whom he or she provides medical services by telemedicine is:  (1) informed of the relationship between the physician and patient and the respective role of any other health care provider with respect to management of the patient; and (2) notified that he or she may decline to receive medical services by telemedicine and may withdraw from such care at any time.    Notes:     Interval History and Content of Current Session:  Interim Events/Subjective Report/Content of Current Session: Pt presents for follow up of MDD and MEHNAZ.  She is currently taking venlafaxine  mg daily.  She was taking a reduced dose of metoprolol, but palpitations returned and mood did not improve, so she went back up to the full 50 mg.  She states today that she is not doing well.  Feels frustrated and like she will be dealing with depression on and off her whole life.  Affect is sad, tearful.  Reports thoughts that she might be better off dead, but denies any plans or intention to harm or kill herself.  States  she does have a gun, but is willing to give it to someone else for now.     7/10/2024   PHQ-9 Depression Patient Health Questionnaire    Over the last two weeks how often have you been bothered by little interest or pleasure in doing things 2    Over the last two weeks how often have you been bothered by feeling down, depressed or hopeless 2    Over the last two weeks how often have you been bothered by trouble falling or staying asleep, or sleeping too much 0    Over the last two weeks how often have you been bothered by feeling tired or having little energy 3    Over the last two weeks how often have you been bothered by a poor appetite or overeating 3    Over the last two weeks how often have you been bothered by feeling bad about yourself - or that you are a failure or have let yourself or your family down 2    Over the last two weeks how often have you been bothered by trouble concentrating on things, such as reading the newspaper or watching television 1    Over the last two weeks how often have you been bothered by moving or speaking so slowly that other people could have noticed. 0    Over the last two weeks how often have you been bothered by thoughts that you would be better off dead, or of hurting yourself 1    If you checked off any problems, how difficult have these problems made it for you to do your work, take care of things at home or get along with other people? Somewhat difficult    PHQ-9 Score 14            Review of Systems   PSYCHIATRIC: Pertinant items are noted in the narrative.    Past Medical, Family and Social History: The patient's past medical, family and social history have been reviewed and updated as appropriate within the electronic medical record - see encounter notes.    Compliance: yes    Side effects: None    Risk Parameters:  Patient reports no suicidal ideation  Patient reports no homicidal ideation  Patient reports no self-injurious behavior  Patient reports no violent  behavior    Exam (detailed: at least 9 elements; comprehensive: all 15 elements)   Constitutional  Vitals:  Most recent vital signs, dated less than 90 days prior to this appointment, were reviewed.   There were no vitals filed for this visit.     General:  unremarkable, age appropriate, well dressed, neatly groomed     Musculoskeletal  Muscle Strength/Tone:  not examined   Gait & Station:  Not observed     Psychiatric  Speech:  no latency; no press   Mood & Affect:  irritable, sad  increased in intensity, labile, sad   Thought Process:  normal and logical   Associations:  intact   Thought Content:  hallucinations: (auditory: No, visual: No), suicidal thoughts: (active-No, passive-Yes), homicidal thoughts: (active-No, passive-No)   Insight:  has awareness of illness   Judgement: behavior is adequate to circumstances   Orientation:  grossly intact   Memory: intact for content of interview   Language: grossly intact   Attention Span & Concentration:  able to focus   Fund of Knowledge:  intact and appropriate to age and level of education     Assessment and Diagnosis   Status/Progress: Based on the examination today, the patient's problem(s) is/are worsening and failing to respond as expected to treatment.  New problems have been presented today.   Lack of compliance are not complicating management of the primary condition.  There are no active rule-out diagnoses for this patient at this time.     General Impression: MDD and MEHNAZ, now in a severe depressive episode with passive SI.  No plan or intent.  Hospitalization not indicated at this time.  Will add adjunct for depression and will recommend increased frequency of therapy sessions with JANETH Griffith.  Pt identified a cousin that she can give her gun to temporarily and agrees to do so today.      ICD-10-CM ICD-9-CM   1. MDD (major depressive disorder), recurrent, in partial remission  F33.41 296.35   2. MEHNAZ (generalized anxiety disorder)  F41.1 300.02        Continue venlafaxine  mg qAM  Trial of aripiprazole 2 mg daily.  Risks/bebefits/side effects discussed.  F/u 3 weeks    Intervention/Counseling/Treatment Plan   Medication Management: The risks and benefits of medication were discussed with the patient.      Return to Clinic: 2 weeks    Visit today included increased complexity associated with the care of the episodic problem depression addressed and managing the longitudinal care of the patient due to the serious and/or complex managed problem(s) MDD, MEHNAZ.

## 2024-07-10 NOTE — PROGRESS NOTES
"Discussed patient with psych PA, Nichole Ferguson.  PA has concerns regarding patient's current emotional state; patient expressed some thoughts of passive suicidal ideation. PA requests that LCSW speak with patient and offer support.   LCSW called and spoke with patient today by phone. Patient indicates that she was expecting the call. She says she was feeling emotional at the time of the conversation with the Psych PA and did express that sometimes she feels she would be better off dead. Patient says "I am frustrated". Patient discusses recent changes in her medication. She expresses the thought that she has lived with depression for most of her life and expects to continue with feelings of depression at times. Says she is doing well today; she is busy getting ready for an Art Show. Patient expresses her desire to stay busy; she feels best when she is busy and useful. She denies SI.  She agrees to reach out to LCSW for telephone or in person consult at any time. She plans to follow up with her regular psychiatrist; has a scheduled upcoming appointment.    "

## 2024-07-12 NOTE — TELEPHONE ENCOUNTER
Spoke with patient. She denies a need at present to come in for counseling. She will contact LCSW if needed prior to her scheduled future visit.

## 2024-07-18 RX ORDER — ATORVASTATIN CALCIUM 20 MG/1
20 TABLET, FILM COATED ORAL
Qty: 90 TABLET | Refills: 1 | Status: SHIPPED | OUTPATIENT
Start: 2024-07-18

## 2024-07-27 RX ORDER — MONTELUKAST SODIUM 10 MG/1
10 TABLET ORAL NIGHTLY
Qty: 90 TABLET | Refills: 1 | Status: SHIPPED | OUTPATIENT
Start: 2024-07-27

## 2024-07-27 RX ORDER — FLUTICASONE PROPIONATE 50 MCG
SPRAY, SUSPENSION (ML) NASAL
Qty: 16 G | Refills: 0 | Status: SHIPPED | OUTPATIENT
Start: 2024-07-27 | End: 2024-08-01

## 2024-07-31 ENCOUNTER — PATIENT MESSAGE (OUTPATIENT)
Dept: PSYCHIATRY | Facility: CLINIC | Age: 74
End: 2024-07-31

## 2024-08-01 RX ORDER — FLUTICASONE PROPIONATE 50 MCG
SPRAY, SUSPENSION (ML) NASAL
Qty: 16 G | Refills: 0 | Status: SHIPPED | OUTPATIENT
Start: 2024-08-01

## 2024-08-01 RX ORDER — LEVOTHYROXINE SODIUM 25 UG/1
25 TABLET ORAL
Qty: 30 TABLET | Refills: 5 | Status: SHIPPED | OUTPATIENT
Start: 2024-08-01

## 2024-08-21 ENCOUNTER — PATIENT MESSAGE (OUTPATIENT)
Dept: PRIMARY CARE CLINIC | Facility: CLINIC | Age: 74
End: 2024-08-21
Payer: MEDICARE

## 2024-09-02 DIAGNOSIS — M19.90 OSTEOARTHRITIS, UNSPECIFIED OSTEOARTHRITIS TYPE, UNSPECIFIED SITE: ICD-10-CM

## 2024-09-05 ENCOUNTER — PATIENT MESSAGE (OUTPATIENT)
Dept: ADMINISTRATIVE | Facility: OTHER | Age: 74
End: 2024-09-05
Payer: MEDICARE

## 2024-09-05 ENCOUNTER — PATIENT MESSAGE (OUTPATIENT)
Dept: PRIMARY CARE CLINIC | Facility: CLINIC | Age: 74
End: 2024-09-05

## 2024-09-05 ENCOUNTER — CLINICAL SUPPORT (OUTPATIENT)
Dept: PRIMARY CARE CLINIC | Facility: CLINIC | Age: 74
End: 2024-09-05
Payer: MEDICARE

## 2024-09-05 DIAGNOSIS — F41.9 ANXIETY: Primary | ICD-10-CM

## 2024-09-05 RX ORDER — MELOXICAM 7.5 MG/1
TABLET ORAL
Qty: 180 TABLET | Refills: 1 | Status: SHIPPED | OUTPATIENT
Start: 2024-09-05

## 2024-09-05 NOTE — PROGRESS NOTES
"Individual Psychotherapy (LCSW)  Alison Lobo  9/5/2024  DATE:  9/5/2024  TYPE OF VISIT:  In person  LENGTH OF SESSION: 45    Therapeutic Intervention: Met with patient for individual psychotherapy.  Busy.   Chief complaint/reason for encounter: depression, anxiety, sleep, and appetite   TREATMENT GOALS (per patient):  "To feel more relevant. "How can I be relevant to other people."   To experience calmness and peacefulness. To cope with the estrangement from her only child, Rachel.    Exercise, sleep better, increase enjoyable activities.      Patient returns today for a follow up visit with LCSW. Patient had expressed feeling that she was ready to complete her sessions at her last visit. Per patient she continues to keep busy and is feeling good about her life at present. Discusses the wonderful week long visit she had with her grandson, Godfrey.  Describes a close [place] she feels with Godfrey.  Mentions the difficulty in adjusting after he left. Encouraged pt to follow up with Godfrey as much as she wants. She remains hopeful that she will also see her granddaughter soon.    Patient describes being active with the [employer]. She is taking art classes regularly and trying to remain focused on improving her artistic abilities rather than noticing the talent of others.  She has had work done to her house to convert the [employer] to an art studio; it is almost completed.  Patient has been doing some gardening and has gone to the gym.  She enjoyed using the virtual bike trip recently at the gym.  Rode through the [facility].    She has been working with her psychiatrist to wean off venlaxafine. May try Prozac again at some point. Sleeping well when she does not get wrapped up reading or watching something.  Not eating that well; often snacks. Little motivation to prepare meals for herself.    She has plans to attend an art retreat soon.  She discusses her hopes of maintaining a close relationship with her " grandkids as she had with her grandmother.   Patient feels she is doing well currently. Discussed her goal of being relevant; patient has become much more active and engaged; she feels relevant to a number of people close to her.  She has increased her exercise and her enjoyable activities.  Patient and LCSW agree that she has met her goals at this time.   Patient will reach out in about a month. Does not want to schedule more follow up visits. LCSW agrees with plan.         Prior Session:  Met with patient. She reports that she is feeling much better since adjusting her medication. She feels more at peace with the situation with her daughter. Has made the decision to accept the situation as it is and focus more on living her own life; she plans to reach out less to the grandkids.  Her grandson is coming for a visit soon and she is looking forward to that.    Patient discusses her concern regarding her cousin Beatriz and Beatriz's , Tom. Says Tom is a heavy drinker and is also very controlling. Beatriz was happy while he was out of town and was openly unhappy when he returned from his trip early.   Patient discusses her desire to be a continued support for Beatriz Cespedes.    Patient reports feeling motivated; she has decided to clear out her garage and create an art studio space.  She is sleeping better with the use of the CPAP machine.  Has not been going to the gym much.     Working on sleeping, using cpap. Not going to the gym.   Patient is feeling good right now with the decisions she has made and the plans she is working on for herself. She has been able to read a bit more of the When Parents Hurt book and has found it helpful. Discussed where she is with therapy; patient requests to continue with sessions but is agreeable to having less frequent sessions.  Agreed to another session in 2 months.     Has been seen by Psych PA for medication review.  Says the medications are no longer working. Is ready to change the  "medications. A lot of sadness. Becomes tearful discussing how she is currently feeling. A good friend's son  recently; she attended the  and has been feeling down since then. Finds it difficult to accept life as it is.   Discussed the difficult place she has been in even before this tragic unexpected death. Patient is coming to  with the idea that she is ready to give up on chasing after her daughter and grandchildren. Tired of trying so hard to be a part of their lives.  Says"  "Rejection can be God's protection". Maybe it is best that she and Rachel do not have a relationship.   She notified her son in law Asif that they should not expect her to come out to see the grandkids this year. Blamed health issues.  Discussed this process of grieving after a "divorce" of sorts; normalized her feelings of sadness and anger. Patient continues to process her feelings about living in [place]; she finds it difficult to be here. Came here because of her cancer diagnosis and because of the Covid lockdown. Discussed the idea that the move may have been needed at that time,but maybe she is not destined to stay here. Discussed taking a vacation once she is feeling better. Patient agrees.   Discussed what she is using to cope with her depression: she went to lunch with a friend today; she continues to exercise; she is trying to be healthy. Discussed the nature of depression as an all-encompassing state of being that causes one to turn inward. The selfish aspect of depression. Patient says she will continue to focus on getting outside daily; she plans to keep in touch with her friend whose son just . Is hopeful that the more she does for others, the better she will feel. Able to recognize that her thoughts are often the reason for feeling down.  She is able to make note of automatic negative thoughts she experiences. Recognizes the thoughts are often over generalizations and exaggerated. She is able to " counteract the thoughts with more positive and realistic thoughts.      Current symptoms:  Depression: anhedonia, worthlessness/guilt, hopelessness, and decreased appetite.  Anxiety: excessive worrying and restlessness.  Insomnia: non-restful sleep.  Heidi:  denies.  Psychosis: denies .      Risk parameters:  Patient reports no suicidal ideation  Patient reports no homicidal ideation  Patient reports no self-injurious behavior  Patient reports no violent behavior    MENTAL HEALTH STATUS EXAM  General Appearance:  unremarkable, age appropriate   Speech: normal tone, normal rate, normal pitch, normal volume      Level of Cooperation: cooperative      Thought Processes: normal and logical   Mood: steady      Thought Content: normal, no suicidality, no homicidality, delusions, or paranoia   Affect: congruent and appropriate   Orientation: Oriented x3   Attention Span & Concentration: intact   Fund of General Knowledge: intact and appropriate to age and level of education   Judgment & Insight: good     Language  intact       STRENGTHS AND LIABILITIES: Strength: Patient accepts guidance/feedback, Strength: Patient is expressive/articulate., Strength: Patient has reasonable judgment., Strength: Patient is stable.    IMPRESSION:   My diagnostic impression is Anxiety disorders; generalized anxiety disorder [F41.1] and Major Depressive Disorder, Recurrent, Mild (F33.0), as evidenced by feeling down, depressed, feeling irritable, difficulty sleeping, poor appetite, worrying too much.       Treatment plan:  Target symptoms: recurrent depression, anxiety , adjustment  Why chosen therapy is appropriate versus another modality: relevant to diagnosis, patient responds to this modality, evidence based practice  Outcome monitoring methods: self-report, observation, checklist/rating scale  Therapeutic intervention type: insight oriented psychotherapy    Patient's response to intervention:  The patient's response to intervention is  guarded.    Progress toward goals and other mental status changes:  The patient's progress toward goals is good.    Plan: Pt plans to continue individual psychotherapy CBT will be utilized in future individual therapy sessions to increase interaction, insight, and support.     Return to clinic:  None scheduled, sessions completed.

## 2024-09-23 ENCOUNTER — LAB VISIT (OUTPATIENT)
Dept: LAB | Facility: HOSPITAL | Age: 74
End: 2024-09-23
Attending: INTERNAL MEDICINE
Payer: MEDICARE

## 2024-09-23 DIAGNOSIS — R73.03 PREDIABETES: Chronic | ICD-10-CM

## 2024-09-23 DIAGNOSIS — E78.49 OTHER HYPERLIPIDEMIA: Chronic | ICD-10-CM

## 2024-09-23 DIAGNOSIS — E67.3 HYPERVITAMINOSIS D: ICD-10-CM

## 2024-09-23 DIAGNOSIS — E53.8 FOLATE DEFICIENCY: ICD-10-CM

## 2024-09-23 LAB
25(OH)D3+25(OH)D2 SERPL-MCNC: 54 NG/ML (ref 30–96)
CHOLEST SERPL-MCNC: 172 MG/DL (ref 120–199)
CHOLEST/HDLC SERPL: 4.8 {RATIO} (ref 2–5)
ESTIMATED AVG GLUCOSE: 131 MG/DL (ref 68–131)
FOLATE SERPL-MCNC: 13.7 NG/ML (ref 4–24)
HBA1C MFR BLD: 6.2 % (ref 4–5.6)
HDLC SERPL-MCNC: 36 MG/DL (ref 40–75)
HDLC SERPL: 20.9 % (ref 20–50)
LDLC SERPL CALC-MCNC: 93.2 MG/DL (ref 63–159)
NONHDLC SERPL-MCNC: 136 MG/DL
TRIGL SERPL-MCNC: 214 MG/DL (ref 30–150)

## 2024-09-23 PROCEDURE — 82746 ASSAY OF FOLIC ACID SERUM: CPT | Performed by: INTERNAL MEDICINE

## 2024-09-23 PROCEDURE — 83036 HEMOGLOBIN GLYCOSYLATED A1C: CPT | Performed by: INTERNAL MEDICINE

## 2024-09-23 PROCEDURE — 82306 VITAMIN D 25 HYDROXY: CPT | Performed by: INTERNAL MEDICINE

## 2024-09-23 PROCEDURE — 80061 LIPID PANEL: CPT | Performed by: INTERNAL MEDICINE

## 2024-09-24 ENCOUNTER — OFFICE VISIT (OUTPATIENT)
Dept: PRIMARY CARE CLINIC | Facility: CLINIC | Age: 74
End: 2024-09-24
Payer: MEDICARE

## 2024-09-24 VITALS
HEART RATE: 88 BPM | SYSTOLIC BLOOD PRESSURE: 122 MMHG | WEIGHT: 192.38 LBS | TEMPERATURE: 97 F | HEIGHT: 63 IN | BODY MASS INDEX: 34.09 KG/M2 | DIASTOLIC BLOOD PRESSURE: 64 MMHG | OXYGEN SATURATION: 94 %

## 2024-09-24 DIAGNOSIS — M19.90 OSTEOARTHRITIS, UNSPECIFIED OSTEOARTHRITIS TYPE, UNSPECIFIED SITE: ICD-10-CM

## 2024-09-24 DIAGNOSIS — K76.0 HEPATIC FIBROSIS DUE TO NONALCOHOLIC FATTY LIVER DISEASE: Chronic | ICD-10-CM

## 2024-09-24 DIAGNOSIS — M48.10 DISH (DIFFUSE IDIOPATHIC SKELETAL HYPEROSTOSIS): Primary | ICD-10-CM

## 2024-09-24 DIAGNOSIS — R73.03 PREDIABETES: Chronic | ICD-10-CM

## 2024-09-24 DIAGNOSIS — Z23 NEED FOR VACCINATION: ICD-10-CM

## 2024-09-24 DIAGNOSIS — K74.00 HEPATIC FIBROSIS DUE TO NONALCOHOLIC FATTY LIVER DISEASE: Chronic | ICD-10-CM

## 2024-09-24 PROBLEM — R42 VERTIGO: Status: RESOLVED | Noted: 2024-01-19 | Resolved: 2024-09-24
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
## 2024-09-24 PROCEDURE — 99215 OFFICE O/P EST HI 40 MIN: CPT | Mod: S$PBB,,, | Performed by: INTERNAL MEDICINE

## 2024-09-24 PROCEDURE — 99999PBSHW FLU VACCINE - ADJUVANTED: Mod: PBBFAC,,,

## 2024-09-24 PROCEDURE — 90653 IIV ADJUVANT VACCINE IM: CPT | Mod: PBBFAC,PN

## 2024-09-24 PROCEDURE — 99214 OFFICE O/P EST MOD 30 MIN: CPT | Mod: PBBFAC,PN,25 | Performed by: INTERNAL MEDICINE

## 2024-09-24 PROCEDURE — 99999 PR PBB SHADOW E&M-EST. PATIENT-LVL IV: CPT | Mod: PBBFAC,,, | Performed by: INTERNAL MEDICINE

## 2024-09-24 RX ORDER — VENLAFAXINE 37.5 MG/1
37.5 TABLET ORAL DAILY
COMMUNITY

## 2024-09-24 RX ORDER — METFORMIN HYDROCHLORIDE 1000 MG/1
1000 TABLET ORAL 2 TIMES DAILY WITH MEALS
Qty: 180 TABLET | Refills: 3 | Status: SHIPPED | OUTPATIENT
Start: 2024-09-24 | End: 2025-09-24

## 2024-09-24 RX ORDER — DIPHENHYDRAMINE HCL 25 MG
25 CAPSULE ORAL EVERY 6 HOURS PRN
COMMUNITY

## 2024-09-24 RX ORDER — ZINC GLUCONATE 50 MG
50 TABLET ORAL DAILY
COMMUNITY

## 2024-09-24 RX ORDER — TRAZODONE HYDROCHLORIDE 50 MG/1
50 TABLET ORAL NIGHTLY PRN
COMMUNITY

## 2024-09-24 RX ORDER — MELOXICAM 7.5 MG/1
7.5 TABLET ORAL DAILY
Qty: 90 TABLET | Refills: 1 | Status: SHIPPED | OUTPATIENT
Start: 2024-09-24 | End: 2025-03-23

## 2024-09-24 RX ORDER — FLUOXETINE HYDROCHLORIDE 20 MG/1
1 CAPSULE ORAL DAILY
COMMUNITY
Start: 2024-09-23

## 2024-09-24 NOTE — PROGRESS NOTES
"Alison Lobo  09/24/2024  [id number]    Roselia Gillette MD  Patient Care Team:  Roselia Gillette MD as PCP - General (Internal Medicine)  Ishan Whatley MD as Consulting Physician (Breast Surgery)    Visit Type: Follow-up    Ms. Alison Lobo is a 74 year old female here for scheduled f/u.      Chronic medical issues include prediabetes, B Chun's neuroma/foot pain, JADA (CPAP), obesity, R breast cancer 2002, h/o L breast cancer 2018 (both small invasive ductal carcinoma), recurrent MDD, MEHNAZ, HLD, hypothyroid, recent diagnosis hepatic fibrosis due to NAFLD.    Incidental finding of DISH in thoracic spine on recent CXR    She has established w external Psychiatrist and has wrapped-up her work w [facility] [employer]    History of Present Illness    CHIEF COMPLAINT:  Chief Complaint    PSYCHIATRIC MEDICATIONS:  She is transitioning from venlafaxine to fluoxetine under psychiatric care. Currently taking venlafaxine 75mg daily, decreasing to 37.5mg, and fluoxetine 20mg daily. She also takes trazodone 50mg as needed for sleep. She reports mood improvement with recent changes, feeling like "a cloud has lifted," particularly with venlafaxine reduction. She was previously taking 300mg venlafaxine daily and feels she may have been overmedicated.    PRE-DIABETES MANAGEMENT:  Note increase in A1C but remaining in the pre-diabetic range. Currently taking Metformin 1500 mg daily with good tolerance. She is in agreement w increasing metformin to 2000 mg daily. She acknowledges the need to improve eating habits, noting fluctuations between lack of interest in food and excessive chocolate consumption. She denies prior diabetes diagnosis and expresses desire to prevent progression from pre-diabetes.    SUPPLEMENTS AND PAIN MANAGEMENT:  She continues B complex supplement due to previously "low normal" B12 and folate levels. Recent labs shows improved folate and B12 levels. She takes turmeric 1400mg daily (two tablets) " and has reduced meloxicam from two 7.5 mg tabs to just one 7.5mg tab daily. Discussed potential kidney or liver effects or increased bleeding risk of meloxicam and with turmeric. She's considering increasing turmeric to compensate for meloxicam reduction. She also takes zinc 50 mg for COVID-19 prevention.    MUSCULOSKELETAL CONCERNS:  She reports back stiffness, decreased flexibility (particularly in lateral movements), and mid-back pain. Initially attributed to gym overexertion, but symptoms have persisted. Discussed today new finding of DISH (Diffuse Idiopathic Skeletal Hyperostosis) on recent imaging. She has noticed reduced flexibility during physical activities. She also reports ongoing foot issues related to Chun's neuroma.     NON-ALCOHOLIC FATTY LIVER DISEASE:  She has non-alcoholic fatty liver disease with a low MELD score of 8 on a scale of 6-40. She reports occasional alcohol consumption despite previous advice against it. She understands the need for weight loss and is adopting a Mediterranean diet to manage her condition. She denies significant liver concerns but recognizes the importance of not treating it lightly.    CARDIOVASCULAR HEALTH:  Reviewed cardiac risk factors w intermediate ACSVD risk score of 15.8%. She feels she is at lower risk for cardiovascular event aside from age as her cardiologist sees her just annually and has commented that her primary risk factor is age. She denies current cardiovascular symptoms or concerns.    RESPIRATORY AND ENT:  She reports slight pain and burning sensation in her sinuses, using intra-nasal wash for symptom management. She denies congestion. She reports no hearing issues or current ear problems such as pain, fullness, or popping sensations.    COVID-19 PREVENTION:  She plans to receive the updated COVID vaccine in the near future.     SLEEP:  She reports a history of restless leg symptoms diagnosed during a previous sleep study, but denies awareness of  RLS symptoms during waking hours or disrupting sleep.       PHQ-4 Score: 4     From LOV w me 6/24/24  Completed PT - found very helpful - vertigo symptoms pretty much resolved  Using intra-nasal steroid spray (not intra-nasal saline water flush though)  Sense of taste has improved some   Reports sleeping well now  Involved in some new projects and signed up for some art classes     Has not noted decreasing metoprolol dose significantly affected mood or energy level and heart rate back up again since decreased dose - she is in agreement w retrying prior metoprolol dose of 50 mg daily and will monitor to see if notes any affect on mood or energy level     PHQ-4 Score: 4     Recent appointments:   9/05/24 [facility] discharge session    External appointments  9/23/24 [facility]    Upcoming appointments:  Future Appointments       Date Provider Specialty Appt Notes    12/17/2024 Roland Marie MD Hepatology  Arrive at: Telehealth NAFLD (nonalcoholic fatty liver disease) [K76.0]  Hepatic fibrosis due to nonalcoholic fatty liver disease [K74.00, K76.0]    12/18/2024 Ishan Whatley MD Breast Surgery Follow up in about 6 months (around 12/3/2024), or if symptoms worsen or fail to improve, for PE, US (S) w/ Dr. Whatley.    2/10/2025 Roselia Gillette MD Primary Care Five month f/u           The following were reviewed: Active problem list, medication list, allergies, family history, social history, and Health Maintenance.     Medications have been reviewed and reconciled with patient at visit today.    Exam: sat 94%  Vitals:    09/24/24 1315   BP: 122/64   Pulse: 88   Temp: 97.3 °F (36.3 °C)     Weight: 87.2 kg (192 lb 5.6 oz)   Body mass index is 34.07 kg/m².    BP Readings from Last 3 Encounters:   09/24/24 122/64   06/24/24 126/62   03/19/24 122/60      Wt Readings from Last 3 Encounters:   09/24/24 1315 87.2 kg (192 lb 5.6 oz)   06/24/24 1320 85.3 kg (188 lb 0.8 oz)   01/25/24 1328 81.6 kg (179  lb 14.3 oz)      Physical Exam  Vitals reviewed.   Constitutional:       General: She is not in acute distress.     Appearance: Normal appearance. She is obese. She is not ill-appearing.   HENT:      Head: Normocephalic and atraumatic.      Right Ear: Tympanic membrane, ear canal and external ear normal.      Left Ear: Tympanic membrane, ear canal and external ear normal.      Nose: Nose normal.      Comments: Mild tenderness on percussion over frontal and maxillary sinuses.     Mouth/Throat:      Mouth: Mucous membranes are moist.      Pharynx: Oropharynx is clear.   Eyes:      General: No scleral icterus.     Extraocular Movements: Extraocular movements intact.      Conjunctiva/sclera: Conjunctivae normal.   Neck:      Vascular: No carotid bruit.   Cardiovascular:      Rate and Rhythm: Regular rhythm.      Heart sounds: No murmur heard.     Comments: Borderline tachycardia  Pulmonary:      Effort: Pulmonary effort is normal. No respiratory distress.      Breath sounds: No wheezing, rhonchi or rales.   Abdominal:      General: Bowel sounds are normal.      Palpations: Abdomen is soft.      Tenderness: There is no abdominal tenderness. There is no guarding.   Musculoskeletal:      Right lower leg: No edema.      Left lower leg: No edema.   Lymphadenopathy:      Cervical: No cervical adenopathy.   Skin:     General: Skin is warm and dry.   Neurological:      Mental Status: She is alert and oriented to person, place, and time. Mental status is at baseline.   Psychiatric:         Mood and Affect: Mood normal.         Behavior: Behavior normal.         Thought Content: Thought content normal.         Judgment: Judgment normal.        Laboratory Reviewed  Lab Results   Component Value Date    WBC 5.69 06/04/2024    HGB 13.5 06/04/2024    HCT 41.2 06/04/2024     06/04/2024    MCV 99 (H) 06/04/2024    CHOL 172 09/23/2024    TRIG 214 (H) 09/23/2024    HDL 36 (L) 09/23/2024    LDLCALC 93.2 09/23/2024    ALT 36  "06/04/2024    AST 30 06/04/2024     06/04/2024    K 4.3 06/04/2024     06/04/2024    CREATININE 0.7 06/04/2024    BUN 21 06/04/2024    CO2 27 06/04/2024    MG 2.2 01/18/2024    TSH 2.763 01/18/2024    FREET4 0.90 01/18/2024    INR 1.1 06/04/2024    HGBA1C 6.2 (H) 09/23/2024    CRP 1.1 12/15/2022     Lab Results   Component Value Date    CALCIUM 9.8 06/04/2024    PHOS 3.3 11/07/2022      Lab Results   Component Value Date    CUWNQNEE94 1633 (H) 05/15/2024     Lab Results   Component Value Date    FOLATE 13.7 09/23/2024    No results found for: "UIBC", "IRON", "TRANS", "TRANSFERRIN", "TIBC", "LABIRON", "FESATURATED"   Lab Results   Component Value Date    EGFRNORACEVR >60.0 06/04/2024    ALBUMIN 3.4 (L) 06/04/2024     Lab Results   Component Value Date    EVDXJQWA59CY 54 09/23/2024 6/03/24 CEA/CA 27.29 wnl  5/15/24 mma 0.15    Mammogram 6/03/24 BIRADS 2: BENIGN   BREAST TISSUE DENSITY B: SCATTERED FIBROGLANDULAR     CXR 6/03/24 Stable eventration of the right hemidiaphragm. No acute pulmonary   opacity or pleural fluid. The cardiomediastinal silhouette is within normal   limits. DISH in the thoracic spine. Surgical clips in the right breast.     Assessment:   74 y.o. female with multiple co-morbid illnesses here for continued follow up of medical problems.      The primary encounter diagnosis was DISH (diffuse idiopathic skeletal hyperostosis). Diagnoses of Prediabetes, Osteoarthritis, unspecified osteoarthritis type, unspecified site, Hepatic fibrosis due to nonalcoholic fatty liver disease, and Need for vaccination were also pertinent to this visit.      Plan:   1. DISH (diffuse idiopathic skeletal hyperostosis)  Assessment & Plan:  Encourage movement - consider PT?      2. Prediabetes  Assessment & Plan:  Ok to increase metformin to decrease hepatic glycogenesis and insulin resistance - encourage movement and healthy food and beverage choices      3. Osteoarthritis, unspecified osteoarthritis type, " unspecified site  -     meloxicam (MOBIC) 7.5 MG tablet; Take 1 tablet (7.5 mg total) by mouth once daily.  Dispense: 90 tablet; Refill: 1    4. Hepatic fibrosis due to nonalcoholic fatty liver disease  Assessment & Plan:  Stable - limit hepatoxin exposure - limit etOH - weight loss - cont routine surveillance per hepatology recs      5. Need for vaccination  -     Influenza - Trivalent (Adjuvanted)    Other orders  -     metFORMIN (GLUCOPHAGE) 1000 MG tablet; Take 1 tablet (1,000 mg total) by mouth 2 (two) times daily with meals.  Dispense: 180 tablet; Refill: 3         Health Maintenance         Date Due Completion Date    COVID-19 Vaccine (8 - 2024-25 season) 09/01/2024 1/6/2024    Hemoglobin A1c (Prediabetes) 09/23/2025 9/23/2024    DEXA Scan 05/12/2027 5/12/2022    TETANUS VACCINE 05/09/2029 5/9/2019    Lipid Panel 09/23/2029 9/23/2024          -Patient's lab results were reviewed and discussed with patient  -Treatment options and alternatives were discussed with the patient. Patient expressed understanding. Patient was given the opportunity to ask questions and be an active participant in their medical care. Patient had no further questions or concerns at this time.     Follow up: Follow up in about 5 months (around 2/24/2025) for Follow Up w me.    Care Plan/Goals: Reviewed Yes   Goals         exercise (pt-stated)       Achievable - within patient's control: Yes    Difficulties Identified: don't like exercising indoors - getting started    Plan for Overcoming difficulties: set intention for 3x week chair yoga or cardio at [facility] starting Thursd ya - plan to log so can monitor persistence    Timeframe for completion: 3 mos                    After visit summary printed and given to patient upon discharge.  Patient goals and care plan are included in After visit summary.    TOTAL TIME evaluating and managing this patient for this encounter was 57 minutes. This time was spent personally by me on some of the  following activities: review of patient's past medical history, assessing age-appropriate health maintenance needs, review of any interval history, review and interpretation of lab results, review and interpretation of imaging test results, review and interpretation of cardiology test results, reviewing consulting specialist notes, obtaining history from the patient and family, examination of the patient, medication reconciliation, managing and/or ordering prescription medications, ordering imaging tests, ordering referral to subspecialty provider(s), educating patient and answering their questions about diagnosis, treatment plan, and goals of treatment, discussing planned follow-up and final documentation of the visit. This time was exclusive of any separately billable procedures for this patient and exclusive of time spent treating any other patients.     This note was generated with the assistance of ambient listening technology. Verbal consent was obtained by the patient and accompanying visitor(s) for the recording of patient appointment to facilitate this note. I attest to having reviewed and edited the generated note for accuracy, though some syntax or spelling errors may persist. Please contact the author of this note for any clarification.

## 2024-09-24 NOTE — PATIENT INSTRUCTIONS
If you are feeling unwell, we'd like to be the first ones to know here at [facility]! Please give us a call. Same day appointments are our top priority to keep you well and out of the emergency rooms and hospitals. Call [phone] for our direct line. After hours advice is always available. Please call [phone] after hours to speak to the on-call team.      Recommend Covid vaccines that can be scheduled at your pharmacy of choice.

## 2024-10-10 ENCOUNTER — PATIENT MESSAGE (OUTPATIENT)
Dept: PRIMARY CARE CLINIC | Facility: CLINIC | Age: 74
End: 2024-10-10
Payer: MEDICARE

## 2024-10-11 DIAGNOSIS — M54.12 CERVICAL RADICULOPATHY: Primary | ICD-10-CM

## 2024-10-11 DIAGNOSIS — M48.10 DISH (DIFFUSE IDIOPATHIC SKELETAL HYPEROSTOSIS): ICD-10-CM

## 2024-10-13 NOTE — ASSESSMENT & PLAN NOTE
Stable - limit hepatoxin exposure - limit etOH - weight loss - cont routine surveillance per hepatology recs

## 2024-10-13 NOTE — ASSESSMENT & PLAN NOTE
Ok to increase metformin to decrease hepatic glycogenesis and insulin resistance - encourage movement and healthy food and beverage choices

## 2024-10-16 ENCOUNTER — PATIENT MESSAGE (OUTPATIENT)
Dept: PRIMARY CARE CLINIC | Facility: CLINIC | Age: 74
End: 2024-10-16
Payer: MEDICARE

## 2024-11-04 ENCOUNTER — TELEPHONE (OUTPATIENT)
Dept: PRIMARY CARE CLINIC | Facility: CLINIC | Age: 74
End: 2024-11-04
Payer: MEDICARE

## 2024-11-04 ENCOUNTER — PATIENT MESSAGE (OUTPATIENT)
Dept: PRIMARY CARE CLINIC | Facility: CLINIC | Age: 74
End: 2024-11-04
Payer: MEDICARE

## 2024-11-04 RX ORDER — METOPROLOL SUCCINATE 50 MG/1
50 TABLET, EXTENDED RELEASE ORAL DAILY
Qty: 30 TABLET | Refills: 11 | Status: SHIPPED | OUTPATIENT
Start: 2024-11-04 | End: 2024-11-06 | Stop reason: SDUPTHER

## 2024-11-04 NOTE — TELEPHONE ENCOUNTER
Spoke with patient; she requests a resumption of LCSW sessions.  Dealing with difficult family situation. Scheduled a virtual visit for November11th

## 2024-11-07 RX ORDER — METOPROLOL SUCCINATE 50 MG/1
50 TABLET, EXTENDED RELEASE ORAL DAILY
Qty: 30 TABLET | Refills: 11 | Status: SHIPPED | OUTPATIENT
Start: 2024-11-07

## 2024-11-12 ENCOUNTER — PATIENT MESSAGE (OUTPATIENT)
Dept: PRIMARY CARE CLINIC | Facility: CLINIC | Age: 74
End: 2024-11-12
Payer: MEDICARE

## 2024-11-12 ENCOUNTER — DOCUMENTATION ONLY (OUTPATIENT)
Dept: PRIMARY CARE CLINIC | Facility: CLINIC | Age: 74
End: 2024-11-12
Payer: MEDICARE

## 2024-11-12 NOTE — PROGRESS NOTES
Received a card from patient today informing LCSW that she has chosen to not pursue therapy at this time.  Will remove from current patient list.

## 2024-11-13 ENCOUNTER — PATIENT MESSAGE (OUTPATIENT)
Dept: PRIMARY CARE CLINIC | Facility: CLINIC | Age: 74
End: 2024-11-13
Payer: MEDICARE

## 2024-11-14 ENCOUNTER — TELEPHONE (OUTPATIENT)
Dept: PRIMARY CARE CLINIC | Facility: CLINIC | Age: 74
End: 2024-11-14
Payer: MEDICARE

## 2024-11-14 DIAGNOSIS — G57.63 MORTON'S NEUROMA OF BOTH FEET: Primary | Chronic | ICD-10-CM

## 2024-11-27 NOTE — PROGRESS NOTES
Date of Service: 2024    Chief Complaint:   Alison Lobo is a 74 y.o. female presenting today for her 6-month evaluation given her previous diagnosis of breast cancer. She is due for an ultrasound.  She reports no interval changes.     History of Present Illness:   Mrs. Lobo presents on 2023 to establish care.  She has a history of bilateral breast cancer.  Her right breast cancer was diagnosed in 2022 and she is s/p Lumpectomy and Radiation.  She was placed on Arimidex but did not tolerate this.  She then started Tamoxifen and should remain on this until .  Her left breast cancer diagnosis was in 2018. She elected lumpectomy and radiation.  She completed 5 years of Tamoxifen.     Past Medical History:   Diagnosis Date    Cancer     Contact dermatitis 2024    Estrogen receptor positive 2024    Fall (on) (from) unspecified stairs and steps, subsequent encounter 10/24/2023    Hyperlipidemia     Malignant neoplasm of upper-outer quadrant of right breast in female, estrogen receptor positive 2023    Non-alcoholic fatty liver disease     Vertigo 2024      Past Surgical History:   Procedure Laterality Date    BREAST SURGERY       SECTION      HYSTERECTOMY          Current Outpatient Medications:     atorvastatin (LIPITOR) 20 MG tablet, TAKE 1 TABLET(20 MG) BY MOUTH EVERY DAY, Disp: 90 tablet, Rfl: 1    cholecalciferol, vitamin D3, (VITAMIN D3) 125 mcg (5,000 unit) Tab, Take 5,000 Units by mouth once daily., Disp: , Rfl:     coQ10, ubiquinol, 200 mg Cap, Take 1 capsule by mouth once daily., Disp: , Rfl:     diphenhydrAMINE (BENADRYL) 25 mg capsule, Take 25 mg by mouth every 6 (six) hours as needed for Itching., Disp: , Rfl:     FLUoxetine 20 MG capsule, Take 1 capsule by mouth once daily., Disp: , Rfl:     fluticasone propionate (FLONASE) 50 mcg/actuation nasal spray, SHAKE LIQUID AND USE 1 SPRAY(50 MCG) IN EACH NOSTRIL EVERY DAY, Disp: 16 g,  Rfl: 0    levothyroxine (SYNTHROID) 25 MCG tablet, TAKE 1 TABLET(25 MCG) BY MOUTH BEFORE BREAKFAST, Disp: 30 tablet, Rfl: 5    meloxicam (MOBIC) 7.5 MG tablet, Take 1 tablet (7.5 mg total) by mouth once daily., Disp: 90 tablet, Rfl: 1    metFORMIN (GLUCOPHAGE) 1000 MG tablet, Take 1 tablet (1,000 mg total) by mouth 2 (two) times daily with meals., Disp: 180 tablet, Rfl: 3    metoprolol succinate (TOPROL-XL) 50 MG 24 hr tablet, Take 1 tablet (50 mg total) by mouth once daily., Disp: 30 tablet, Rfl: 11    montelukast (SINGULAIR) 10 mg tablet, TAKE 1 TABLET(10 MG) BY MOUTH EVERY EVENING, Disp: 90 tablet, Rfl: 1    tamoxifen (NOLVADEX) 20 MG Tab, TAKE 1 TABLET BY MOUTH EVERY DAY, Disp: 90 tablet, Rfl: 3    traZODone (DESYREL) 50 MG tablet, Take 50 mg by mouth nightly as needed for Insomnia., Disp: , Rfl:     turmeric (CURCUMIN MISC), 1,400 mg by Misc.(Non-Drug; Combo Route) route once daily., Disp: , Rfl:     UNABLE TO FIND, Take 1,000 mg by mouth. B complex vitamins, Disp: , Rfl:     venlafaxine (EFFEXOR) 37.5 MG Tab, Take 37.5 mg by mouth once daily. With plan to taper off, Disp: , Rfl:     zinc gluconate 50 mg tablet, Take 50 mg by mouth once daily., Disp: , Rfl:    Review of patient's allergies indicates:  No Known Allergies   Social History     Tobacco Use    Smoking status: Never    Smokeless tobacco: Never   Substance Use Topics    Alcohol use: Never      Family History   Problem Relation Name Age of Onset    Hypertension Mother      Diabetes Mother      Heart failure Mother      Angina Father          Review of Systems   Integumentary:  Negative for color change, rash, mole/lesion, breast mass, breast discharge and breast tenderness.   Breast: Negative for mass and tenderness       Physical Exam   Constitutional: She is cooperative.   HENT:   Head: Normocephalic.   Pulmonary/Chest: She exhibits no mass. Right breast exhibits no inverted nipple, no mass, no nipple discharge, no skin change and no tenderness. Left  breast exhibits no inverted nipple, no mass, no nipple discharge, no skin change and no tenderness.   Abdominal: Normal appearance.   Musculoskeletal: Lymphadenopathy:      Upper Body:      Right upper body: No supraclavicular or axillary adenopathy.      Left upper body: No supraclavicular or axillary adenopathy.     Neurological: She is alert.   Skin: No rash noted.        ULTRASOUND EVALUATION and REPORT    Bilateral real-time Ultrasound was performed by me.  All four quadrants of the breast, the subareolar and axillary basins were scanned.    She has some heterogeneous dense fibroglandular tissue bilaterally.  We discussed  this dense tissue and its potential implications.    Right Breast: She has normal tissues in the right breast; there's no disruption of the tissue planes (except at the site of her scar(s)) and no abnormal shadowing; she has normal skin thickness with no subcutaneous nodules of skin thickening; NEM     Left Breast: She has normal tissues in the left breast; there's no disruption of the tissue planes and no abnormal shadowing; she has normal skin thickness with no subcutaneous nodules or skin thickening; NEM     Axillae: There are no abnormal lymph nodes seen bilaterally.     Impression: Some dense but normal tissue bilaterally with no solid/suspicious masses noted. No LAD in bilateral axillae.      BIRADS: Category 2 - Benign Finding.    Findings were discussed with patient in real time and a hand written report was given to her at the conclusion of the exam.       ASSESSMENT and PLAN OF CARE     1. Malignant neoplasm of upper-outer quadrant of right breast in female, estrogen receptor positive  Assessment & Plan:  Patient is doing well and is being followed according to NCCN Guidelines. She understands that her imaging and exams have remained stable and is comfortable being followed in a conservative fashion. She understands the importance of monthly self-breast examination and knows to  report any and all changes as they occur.    NOTE:::We viewed her films together at today's visit.  We discussed the multiple views obtained and the important findings.  Even benign changes were mentioned and her questions were answered.  She is to contact us if she has questions.    She should continue her Tamoxifen as directed.  We discussed some of the side effects and methods to combat these.  Should she have any issues, she can contact us as needed.    Labs obtained today: CBC, Chem 12, CEA, LDH, CA27/29 - after resulted, these will be compared to her previous values and all abnormal values will be phoned to her for discussion.        2. Malignant neoplasm of left breast in female, estrogen receptor positive, unspecified site of breast  Assessment & Plan:  Same as above      Medical Decision Making: HT - It is my impression that the patient suffers from all the listed conditions.  Each of these conditions did affect my Plan of Care and all medical decisions that were rendered.  The medical decision making was of high difficulty based on her comorbidities and her previous diagnosis of breast cancer, which can pose a direct threat to her life.  This patient is currently being treated with ongoing Hormonal Agents that pose inherent risks. Laboratory evaluations are currently pending but will be reviewed in the next 24 hours. Any further recommendations will be communicated to the patient by me.  I have reviewed and verified her allergies, list of medications, medical and surgical histories, social history, and a pertinent review of symptoms.      Follow up: 6 months and prn     For:  Physical Examination, MGM (D) at , CXR, and LABS

## 2024-11-28 NOTE — ASSESSMENT & PLAN NOTE
Patient is doing well and is being followed according to NCCN Guidelines. She understands that her imaging and exams have remained stable and is comfortable being followed in a conservative fashion. She understands the importance of monthly self-breast examination and knows to report any and all changes as they occur.    NOTE:::We viewed her films together at today's visit.  We discussed the multiple views obtained and the important findings.  Even benign changes were mentioned and her questions were answered.  She is to contact us if she has questions.    She should continue her Tamoxifen as directed.  We discussed some of the side effects and methods to combat these.  Should she have any issues, she can contact us as needed.    Labs obtained today: CBC, Chem 12, CEA, LDH, CA27/29 - after resulted, these will be compared to her previous values and all abnormal values will be phoned to her for discussion.

## 2024-12-11 DIAGNOSIS — K76.0 NAFLD (NONALCOHOLIC FATTY LIVER DISEASE): Primary | ICD-10-CM

## 2024-12-13 ENCOUNTER — PATIENT MESSAGE (OUTPATIENT)
Dept: HEPATOLOGY | Facility: CLINIC | Age: 74
End: 2024-12-13
Payer: MEDICARE

## 2024-12-17 RX ORDER — ATORVASTATIN CALCIUM 20 MG/1
20 TABLET, FILM COATED ORAL
Qty: 90 TABLET | Refills: 1 | Status: SHIPPED | OUTPATIENT
Start: 2024-12-17

## 2024-12-17 RX ORDER — TAMOXIFEN CITRATE 20 MG/1
20 TABLET ORAL
Qty: 90 TABLET | Refills: 3 | Status: SHIPPED | OUTPATIENT
Start: 2024-12-17

## 2024-12-18 ENCOUNTER — OFFICE VISIT (OUTPATIENT)
Dept: SURGERY | Facility: CLINIC | Age: 74
End: 2024-12-18
Payer: MEDICARE

## 2024-12-18 ENCOUNTER — LAB VISIT (OUTPATIENT)
Dept: LAB | Facility: HOSPITAL | Age: 74
End: 2024-12-18
Attending: SPECIALIST
Payer: MEDICARE

## 2024-12-18 DIAGNOSIS — C50.912 MALIGNANT NEOPLASM OF LEFT BREAST IN FEMALE, ESTROGEN RECEPTOR POSITIVE, UNSPECIFIED SITE OF BREAST: ICD-10-CM

## 2024-12-18 DIAGNOSIS — Z17.0 MALIGNANT NEOPLASM OF LEFT BREAST IN FEMALE, ESTROGEN RECEPTOR POSITIVE, UNSPECIFIED SITE OF BREAST: ICD-10-CM

## 2024-12-18 DIAGNOSIS — Z17.0 MALIGNANT NEOPLASM OF UPPER-OUTER QUADRANT OF RIGHT BREAST IN FEMALE, ESTROGEN RECEPTOR POSITIVE: ICD-10-CM

## 2024-12-18 DIAGNOSIS — Z17.0 MALIGNANT NEOPLASM OF UPPER-OUTER QUADRANT OF RIGHT BREAST IN FEMALE, ESTROGEN RECEPTOR POSITIVE: Primary | ICD-10-CM

## 2024-12-18 DIAGNOSIS — C50.411 MALIGNANT NEOPLASM OF UPPER-OUTER QUADRANT OF RIGHT BREAST IN FEMALE, ESTROGEN RECEPTOR POSITIVE: Primary | ICD-10-CM

## 2024-12-18 DIAGNOSIS — C50.411 MALIGNANT NEOPLASM OF UPPER-OUTER QUADRANT OF RIGHT BREAST IN FEMALE, ESTROGEN RECEPTOR POSITIVE: ICD-10-CM

## 2024-12-18 LAB
BASOPHILS # BLD AUTO: 0.03 K/UL (ref 0–0.2)
BASOPHILS NFR BLD: 0.6 % (ref 0–1.9)
CEA SERPL-MCNC: 3.2 NG/ML (ref 0–5)
DIFFERENTIAL METHOD BLD: ABNORMAL
EOSINOPHIL # BLD AUTO: 0.1 K/UL (ref 0–0.5)
EOSINOPHIL NFR BLD: 2.4 % (ref 0–8)
ERYTHROCYTE [DISTWIDTH] IN BLOOD BY AUTOMATED COUNT: 13 % (ref 11.5–14.5)
HCT VFR BLD AUTO: 37.3 % (ref 37–48.5)
HGB BLD-MCNC: 12 G/DL (ref 12–16)
IMM GRANULOCYTES # BLD AUTO: 0.01 K/UL (ref 0–0.04)
IMM GRANULOCYTES NFR BLD AUTO: 0.2 % (ref 0–0.5)
LYMPHOCYTES # BLD AUTO: 1.4 K/UL (ref 1–4.8)
LYMPHOCYTES NFR BLD: 26.7 % (ref 18–48)
MCH RBC QN AUTO: 32.7 PG (ref 27–31)
MCHC RBC AUTO-ENTMCNC: 32.2 G/DL (ref 32–36)
MCV RBC AUTO: 102 FL (ref 82–98)
MONOCYTES # BLD AUTO: 0.7 K/UL (ref 0.3–1)
MONOCYTES NFR BLD: 12.4 % (ref 4–15)
NEUTROPHILS # BLD AUTO: 3.1 K/UL (ref 1.8–7.7)
NEUTROPHILS NFR BLD: 57.7 % (ref 38–73)
NRBC BLD-RTO: 0 /100 WBC
PLATELET # BLD AUTO: 233 K/UL (ref 150–450)
PMV BLD AUTO: 11.1 FL (ref 9.2–12.9)
RBC # BLD AUTO: 3.67 M/UL (ref 4–5.4)
WBC # BLD AUTO: 5.39 K/UL (ref 3.9–12.7)

## 2024-12-18 PROCEDURE — 99999 PR PBB SHADOW E&M-EST. PATIENT-LVL I: CPT | Mod: PBBFAC,,, | Performed by: SPECIALIST

## 2024-12-18 PROCEDURE — 82378 CARCINOEMBRYONIC ANTIGEN: CPT | Performed by: SPECIALIST

## 2024-12-18 PROCEDURE — 99211 OFF/OP EST MAY X REQ PHY/QHP: CPT | Mod: PBBFAC,PN | Performed by: SPECIALIST

## 2024-12-18 PROCEDURE — 85025 COMPLETE CBC W/AUTO DIFF WBC: CPT | Performed by: SPECIALIST

## 2024-12-18 PROCEDURE — 99214 OFFICE O/P EST MOD 30 MIN: CPT | Mod: S$PBB,,, | Performed by: SPECIALIST

## 2024-12-20 ENCOUNTER — PATIENT MESSAGE (OUTPATIENT)
Dept: PRIMARY CARE CLINIC | Facility: CLINIC | Age: 74
End: 2024-12-20
Payer: MEDICARE

## 2024-12-20 ENCOUNTER — TELEPHONE (OUTPATIENT)
Dept: GASTROENTEROLOGY | Facility: CLINIC | Age: 74
End: 2024-12-20
Payer: MEDICARE

## 2024-12-20 LAB — CANCER AG27-29 SERPL-ACNC: 18.7 U/ML

## 2024-12-20 NOTE — TELEPHONE ENCOUNTER
----- Message from [employer] sent at 12/20/2024  7:57 AM CST -----    Patient Returning Call        Who Called:pt  Does the patient know what this is regarding?:pt need lab and ultra sound put in need nurse  Would the patient rather a call back or a response via [facility]? call  Best Call Back Number:[phone]  Additional Information: call back

## 2024-12-30 ENCOUNTER — LAB VISIT (OUTPATIENT)
Dept: LAB | Facility: HOSPITAL | Age: 74
End: 2024-12-30
Attending: INTERNAL MEDICINE
Payer: MEDICARE

## 2024-12-30 DIAGNOSIS — K76.0 NAFLD (NONALCOHOLIC FATTY LIVER DISEASE): ICD-10-CM

## 2024-12-30 LAB
ALBUMIN SERPL BCP-MCNC: 3.4 G/DL (ref 3.5–5.2)
ALP SERPL-CCNC: 53 U/L (ref 40–150)
ALT SERPL W/O P-5'-P-CCNC: 25 U/L (ref 10–44)
ANION GAP SERPL CALC-SCNC: 10 MMOL/L (ref 8–16)
AST SERPL-CCNC: 30 U/L (ref 10–40)
BASOPHILS # BLD AUTO: 0.05 K/UL (ref 0–0.2)
BASOPHILS NFR BLD: 0.9 % (ref 0–1.9)
BILIRUB SERPL-MCNC: 0.3 MG/DL (ref 0.1–1)
BUN SERPL-MCNC: 18 MG/DL (ref 8–23)
CALCIUM SERPL-MCNC: 9.7 MG/DL (ref 8.7–10.5)
CHLORIDE SERPL-SCNC: 105 MMOL/L (ref 95–110)
CO2 SERPL-SCNC: 23 MMOL/L (ref 23–29)
CREAT SERPL-MCNC: 0.8 MG/DL (ref 0.5–1.4)
DIFFERENTIAL METHOD BLD: ABNORMAL
EOSINOPHIL # BLD AUTO: 0.1 K/UL (ref 0–0.5)
EOSINOPHIL NFR BLD: 1.1 % (ref 0–8)
ERYTHROCYTE [DISTWIDTH] IN BLOOD BY AUTOMATED COUNT: 13.2 % (ref 11.5–14.5)
EST. GFR  (NO RACE VARIABLE): >60 ML/MIN/1.73 M^2
GLUCOSE SERPL-MCNC: 173 MG/DL (ref 70–110)
HCT VFR BLD AUTO: 41 % (ref 37–48.5)
HGB BLD-MCNC: 13 G/DL (ref 12–16)
IMM GRANULOCYTES # BLD AUTO: 0.01 K/UL (ref 0–0.04)
IMM GRANULOCYTES NFR BLD AUTO: 0.2 % (ref 0–0.5)
LYMPHOCYTES # BLD AUTO: 1.4 K/UL (ref 1–4.8)
LYMPHOCYTES NFR BLD: 24.6 % (ref 18–48)
MCH RBC QN AUTO: 32 PG (ref 27–31)
MCHC RBC AUTO-ENTMCNC: 31.7 G/DL (ref 32–36)
MCV RBC AUTO: 101 FL (ref 82–98)
MONOCYTES # BLD AUTO: 0.6 K/UL (ref 0.3–1)
MONOCYTES NFR BLD: 11.1 % (ref 4–15)
NEUTROPHILS # BLD AUTO: 3.5 K/UL (ref 1.8–7.7)
NEUTROPHILS NFR BLD: 62.1 % (ref 38–73)
NRBC BLD-RTO: 0 /100 WBC
PLATELET # BLD AUTO: 256 K/UL (ref 150–450)
PMV BLD AUTO: 11.2 FL (ref 9.2–12.9)
POTASSIUM SERPL-SCNC: 3.8 MMOL/L (ref 3.5–5.1)
PROT SERPL-MCNC: 6.5 G/DL (ref 6–8.4)
RBC # BLD AUTO: 4.06 M/UL (ref 4–5.4)
SODIUM SERPL-SCNC: 138 MMOL/L (ref 136–145)
WBC # BLD AUTO: 5.66 K/UL (ref 3.9–12.7)

## 2024-12-30 PROCEDURE — 85025 COMPLETE CBC W/AUTO DIFF WBC: CPT | Performed by: INTERNAL MEDICINE

## 2024-12-30 PROCEDURE — 80053 COMPREHEN METABOLIC PANEL: CPT | Performed by: INTERNAL MEDICINE

## 2024-12-30 PROCEDURE — 36415 COLL VENOUS BLD VENIPUNCTURE: CPT | Performed by: INTERNAL MEDICINE

## 2025-01-16 RX ORDER — LEVOTHYROXINE SODIUM 25 UG/1
25 TABLET ORAL
Qty: 90 TABLET | Refills: 1 | Status: SHIPPED | OUTPATIENT
Start: 2025-01-16

## 2025-02-10 ENCOUNTER — OFFICE VISIT (OUTPATIENT)
Dept: PRIMARY CARE CLINIC | Facility: CLINIC | Age: 75
End: 2025-02-10
Payer: MEDICARE

## 2025-02-10 VITALS
SYSTOLIC BLOOD PRESSURE: 122 MMHG | OXYGEN SATURATION: 96 % | HEIGHT: 63 IN | DIASTOLIC BLOOD PRESSURE: 58 MMHG | WEIGHT: 184 LBS | BODY MASS INDEX: 32.6 KG/M2 | TEMPERATURE: 97 F | HEART RATE: 58 BPM

## 2025-02-10 DIAGNOSIS — N39.46 MIXED URGE AND STRESS INCONTINENCE: ICD-10-CM

## 2025-02-10 DIAGNOSIS — Z17.0 MALIGNANT NEOPLASM OF NIPPLE OF LEFT BREAST IN FEMALE, ESTROGEN RECEPTOR POSITIVE: ICD-10-CM

## 2025-02-10 DIAGNOSIS — E53.8 B12 DEFICIENCY: ICD-10-CM

## 2025-02-10 DIAGNOSIS — N30.01 ACUTE CYSTITIS WITH HEMATURIA: ICD-10-CM

## 2025-02-10 DIAGNOSIS — M15.0 PRIMARY OSTEOARTHRITIS INVOLVING MULTIPLE JOINTS: Chronic | ICD-10-CM

## 2025-02-10 DIAGNOSIS — M48.10 DISH (DIFFUSE IDIOPATHIC SKELETAL HYPEROSTOSIS): ICD-10-CM

## 2025-02-10 DIAGNOSIS — E03.9 ACQUIRED HYPOTHYROIDISM: Chronic | ICD-10-CM

## 2025-02-10 DIAGNOSIS — G57.63 MORTON'S NEUROMA OF BOTH FEET: Chronic | ICD-10-CM

## 2025-02-10 DIAGNOSIS — K74.00 HEPATIC FIBROSIS DUE TO NONALCOHOLIC FATTY LIVER DISEASE: Chronic | ICD-10-CM

## 2025-02-10 DIAGNOSIS — E78.49 OTHER HYPERLIPIDEMIA: Chronic | ICD-10-CM

## 2025-02-10 DIAGNOSIS — H91.21 SUDDEN IDIOPATHIC HEARING LOSS OF RIGHT EAR WITH UNRESTRICTED HEARING OF LEFT EAR: ICD-10-CM

## 2025-02-10 DIAGNOSIS — C50.012 MALIGNANT NEOPLASM OF NIPPLE OF LEFT BREAST IN FEMALE, ESTROGEN RECEPTOR POSITIVE: ICD-10-CM

## 2025-02-10 DIAGNOSIS — F33.41 MDD (MAJOR DEPRESSIVE DISORDER), RECURRENT, IN PARTIAL REMISSION: ICD-10-CM

## 2025-02-10 DIAGNOSIS — Z17.0 MALIGNANT NEOPLASM OF UPPER-OUTER QUADRANT OF RIGHT BREAST IN FEMALE, ESTROGEN RECEPTOR POSITIVE: Chronic | ICD-10-CM

## 2025-02-10 DIAGNOSIS — R73.03 PREDIABETES: Chronic | ICD-10-CM

## 2025-02-10 DIAGNOSIS — R26.89 BALANCE PROBLEM: Chronic | ICD-10-CM

## 2025-02-10 DIAGNOSIS — H90.41 SENSORINEURAL HEARING LOSS (SNHL) OF RIGHT EAR WITH UNRESTRICTED HEARING OF LEFT EAR: Primary | ICD-10-CM

## 2025-02-10 DIAGNOSIS — C50.411 MALIGNANT NEOPLASM OF UPPER-OUTER QUADRANT OF RIGHT BREAST IN FEMALE, ESTROGEN RECEPTOR POSITIVE: Chronic | ICD-10-CM

## 2025-02-10 DIAGNOSIS — K76.0 HEPATIC FIBROSIS DUE TO NONALCOHOLIC FATTY LIVER DISEASE: Chronic | ICD-10-CM

## 2025-02-10 DIAGNOSIS — Z01.84 ENCOUNTER FOR ANTIBODY RESPONSE EXAMINATION: ICD-10-CM

## 2025-02-10 PROBLEM — N39.3 STRESS INCONTINENCE OF URINE: Status: RESOLVED | Noted: 2023-03-08 | Resolved: 2025-02-10

## 2025-02-10 LAB
BILIRUB SERPL-MCNC: NORMAL MG/DL
BLOOD, POC UA: NORMAL
GLUCOSE UR QL STRIP: NORMAL
KETONES UR QL STRIP: 15
LEUKOCYTE ESTERASE URINE, POC: NORMAL
NITRITE, POC UA: NORMAL
PH, POC UA: 5
PROTEIN, POC: 100
SPECIFIC GRAVITY, POC UA: 1.01
UROBILINOGEN, POC UA: 2

## 2025-02-10 PROCEDURE — 84439 ASSAY OF FREE THYROXINE: CPT

## 2025-02-10 PROCEDURE — 82607 VITAMIN B-12: CPT

## 2025-02-10 PROCEDURE — 87186 SC STD MICRODIL/AGAR DIL: CPT

## 2025-02-10 PROCEDURE — 99215 OFFICE O/P EST HI 40 MIN: CPT | Mod: PBBFAC,PN

## 2025-02-10 PROCEDURE — 80069 RENAL FUNCTION PANEL: CPT

## 2025-02-10 PROCEDURE — 99999 PR PBB SHADOW E&M-EST. PATIENT-LVL V: CPT | Mod: PBBFAC,,,

## 2025-02-10 PROCEDURE — 80061 LIPID PANEL: CPT

## 2025-02-10 PROCEDURE — 84443 ASSAY THYROID STIM HORMONE: CPT

## 2025-02-10 PROCEDURE — 86769 SARS-COV-2 COVID-19 ANTIBODY: CPT

## 2025-02-10 PROCEDURE — 81001 URINALYSIS AUTO W/SCOPE: CPT

## 2025-02-10 PROCEDURE — 99999PBSHW POCT URINALYSIS: Mod: PBBFAC,,,

## 2025-02-10 PROCEDURE — 83036 HEMOGLOBIN GLYCOSYLATED A1C: CPT

## 2025-02-10 PROCEDURE — 82306 VITAMIN D 25 HYDROXY: CPT

## 2025-02-10 PROCEDURE — 87088 URINE BACTERIA CULTURE: CPT

## 2025-02-10 PROCEDURE — 81003 URINALYSIS AUTO W/O SCOPE: CPT | Mod: PBBFAC,PN

## 2025-02-10 PROCEDURE — 87086 URINE CULTURE/COLONY COUNT: CPT

## 2025-02-10 RX ORDER — CEFUROXIME AXETIL 500 MG/1
500 TABLET ORAL 2 TIMES DAILY
Qty: 14 TABLET | Refills: 0 | Status: SHIPPED | OUTPATIENT
Start: 2025-02-10

## 2025-02-10 NOTE — ASSESSMENT & PLAN NOTE
Followed by Audiologist & external Otolaryngologist  Increasing hearing loss w/ Tinnitus  Balance therapy for 1 month left   Placing olive oil in ear to keep moisturized as instructed by Isaiah (Otolaryngologist)

## 2025-02-10 NOTE — PATIENT INSTRUCTIONS
If you are feeling unwell, we'd like to be the first ones to know here at [facility]! Please give us a call. Same day appointments are our top priority to keep you well and out of the emergency rooms and hospitals. Call [phone] for our direct line. After hours advice is always available. Please call [phone] after hours to speak to the on-call team.

## 2025-02-10 NOTE — ASSESSMENT & PLAN NOTE
Trial pelvic floor therapy, but insurance won't cover while in balance therapy.   POCT UA positive  Started on Ceftin  Recommend 2 hour toileting  Limiting caffeine drinks, increase water intake  Discuss proper wiping/cleaning techniques

## 2025-02-10 NOTE — ASSESSMENT & PLAN NOTE
Continue Tamoxifen as directed.   Continue self breast exams  Mammogram June 2025  F/U w/ Phylicia or Breast Surgeon as recommended

## 2025-02-10 NOTE — ASSESSMENT & PLAN NOTE
Recommend restarting exercise as balance improve.   Discuss portion control & increasing activity as tolerated

## 2025-02-10 NOTE — ASSESSMENT & PLAN NOTE
Improving symptoms, but unable to continue due to insurance issues  Continue w/ ice, well-fitting shoes, rest  Continue Meloxicam

## 2025-02-10 NOTE — PROGRESS NOTES
Alison Lashell  02/10/2025  [id number]    Roselia Gillette MD  Patient Care Team:  Roselia Gillette MD as PCP - General (Internal Medicine)  Ishan Whatley MD as Consulting Physician (Breast Surgery)    Visit Type: Follow-up    Chief Complaint:  Chief Complaint   Patient presents with    Follow-up     5 month f/u     Hearing Problem     Right ear deaf    Labs Only     Pt has not had labs done since 09/2024    Urinary Frequency     Incontinence     Referral     Mammogram in late mid June   Pt would like to use Cailin Grier NP     Urinary Tract Infection       History of Present Illness: Follow-up  Pertinent negatives include no abdominal pain, chest pain, chills, congestion, coughing, fever, headaches, myalgias, nausea, rash, sore throat, vomiting or weakness.   Urinary Frequency   Associated symptoms include frequency. Pertinent negatives include no chills, nausea, vomiting or rash.   Urinary Tract Infection   Associated symptoms include frequency. Pertinent negatives include no chills, nausea, vomiting or rash.      History of Present Illness    CHIEF COMPLAINT:  Alison presents today for follow-up of vertigo and ear-related issues since November.    ENT:  She reports complete hearing loss in her right ear since November with a history of fluid buildup and allergies. She received three steroid injections in the eardrum and a two-week course of oral prednisone. Currently experiences fullness and significant tinnitus in the affected ear. Hearing aids have been unsuccessful despite working with an audiologist. Her left ear hearing is intact but with intensified acuity, making noises louder. She has a history of dry ears previously treated with steroid ear drops. Next ENT appointment is scheduled for July.    NEUROLOGICAL:  She experienced one fall in 2023 attributed to either vertigo or dizziness, with no subsequent falls.    CERVICAL SPINE:  She experienced a pinched nerve in her neck a few months ago,  evaluated by pain management and referred to neurosurgery. Imaging showed disc degeneration and bone spurs. Currently reports minimal symptoms without significant pain. Surgery was discussed but she opted for conservative management given minimal symptoms.    MUSCULOSKELETAL:  She has Chun's neuroma with improvement in symptoms after two physical therapy sessions.    GENITOURINARY:  She reports post-void discomfort and increasing urinary incontinence. She experiences stress-related incontinence with coughing and when delaying bathroom visits, requiring pad use. Reports nocturia once per night.    MEDICAL HISTORY:  She has history of breast cancer treated with radiation therapy and currently on Tamoxifen. She is pre-diabetic. Non-alcoholic fatty liver disease with recent normal labs showing gradually decreasing liver function values.          Recent Fall:  [x]Yes  []No  Activity:  []Vigorous [x]Moderate []Sedentary  Appetite:  [x]Good  []Fair  []Poor  Mood: [x]Stable []Anxious []Depressed   Insomnia: []Yes  [x]No    No data recorded     Hosp/ED/Urgent Care: none    Recent appointments: [facility]; Dr. Bridget Galicia  12/18/24 Breast Surgeon Dr. Whatley/RAYMOND Grier  12/16/24 Psychiatry Dr. AMOR Gill  12/12/24 Otolaryngology Dr. Colon  12/11/24 Hepatology MD Cynthia  12/0/24 Audiology Marline Veras  11/15/24 PT Lejeune   09/24/24 PCP MD Nain    Upcoming appointments:  Future Appointments       Date Provider Specialty Appt Notes    7/28/2025 Roselia Gillette MD Primary Care 6 month f/u             The following were reviewed: Active problem list, medication list, allergies, family history, social history, and Health Maintenance.     Medications have been reviewed and reconciled with patient at visit today.    Review of Systems   Constitutional:  Negative for chills and fever.   HENT:  Negative for congestion, ear pain, sinus pain and sore throat.    Eyes:  Negative for blurred vision and pain.  "  Respiratory:  Negative for cough and shortness of breath.    Cardiovascular:  Negative for chest pain and leg swelling.   Gastrointestinal:  Negative for abdominal pain, nausea and vomiting.   Genitourinary:  Positive for frequency. Negative for dysuria.   Musculoskeletal:  Negative for back pain, falls and myalgias.   Skin:  Negative for itching and rash.   Neurological:  Positive for dizziness. Negative for weakness and headaches.   Psychiatric/Behavioral:  Negative for suicidal ideas. The patient does not have insomnia.         See HPI above    Exam:  Vitals:    02/10/25 1030   BP: (!) 122/58   BP Location: Left arm   Patient Position: Sitting   Pulse: (!) 58   Temp: 97 °F (36.1 °C)   TempSrc: Temporal   SpO2: 96%   Weight: 83.4 kg (183 lb 15.6 oz)   Height: 5' 3" (1.6 m)     Weight: 83.4 kg (183 lb 15.6 oz)   Body mass index is 32.59 kg/m².    BP Readings from Last 3 Encounters:   02/10/25 (!) 122/58   09/24/24 122/64   06/24/24 126/62        Wt Readings from Last 3 Encounters:   02/10/25 1030 83.4 kg (183 lb 15.6 oz)   09/24/24 1315 87.2 kg (192 lb 5.6 oz)   06/24/24 1320 85.3 kg (188 lb 0.8 oz)        Physical Exam  Constitutional:       General: She is awake.      Appearance: Normal appearance. She is normal weight.   HENT:      Head: Normocephalic.      Right Ear: Ear canal and external ear normal. Decreased hearing noted. Tympanic membrane is perforated (hole noted). Tympanic membrane has decreased mobility.      Left Ear: Ear canal and external ear normal. Tympanic membrane has decreased mobility.      Ears:      Comments: Dryness noted to bilateral TM  Dull TM      Nose: Nose normal.      Mouth/Throat:      Mouth: Mucous membranes are moist.      Pharynx: Oropharynx is clear.   Eyes:      Extraocular Movements: Extraocular movements intact.      Conjunctiva/sclera: Conjunctivae normal.      Pupils: Pupils are equal, round, and reactive to light.   Cardiovascular:      Rate and Rhythm: Normal rate and " regular rhythm.      Pulses: Normal pulses.      Heart sounds: Normal heart sounds.   Pulmonary:      Effort: Pulmonary effort is normal.      Breath sounds: Normal breath sounds.   Abdominal:      General: Bowel sounds are normal.      Palpations: Abdomen is soft.   Musculoskeletal:         General: Normal range of motion.      Cervical back: Normal range of motion and neck supple. Pain with movement (minimal) present.   Skin:     General: Skin is warm and dry.      Capillary Refill: Capillary refill takes less than 2 seconds.   Neurological:      General: No focal deficit present.      Mental Status: She is alert and oriented to person, place, and time. Mental status is at baseline.      GCS: GCS eye subscore is 4. GCS verbal subscore is 5. GCS motor subscore is 6.      Sensory: Sensation is intact.      Gait: Gait is intact.   Psychiatric:         Attention and Perception: Attention and perception normal.         Mood and Affect: Mood normal. Affect is flat.         Speech: Speech normal.         Behavior: Behavior normal. Behavior is cooperative.         Thought Content: Thought content normal.         Cognition and Memory: Cognition and memory normal.         Judgment: Judgment normal.          Laboratory Reviewed  Lab Results   Component Value Date    WBC 5.66 12/30/2024    HGB 13.0 12/30/2024    HCT 41.0 12/30/2024     12/30/2024     (H) 12/30/2024    CHOL 172 09/23/2024    TRIG 214 (H) 09/23/2024    HDL 36 (L) 09/23/2024    LDLCALC 93.2 09/23/2024    ALT 25 12/30/2024    AST 30 12/30/2024     12/30/2024    K 3.8 12/30/2024     12/30/2024    CREATININE 0.8 12/30/2024    BUN 18 12/30/2024    CO2 23 12/30/2024    MG 2.2 01/18/2024    TSH 2.763 01/18/2024    FREET4 0.90 01/18/2024    INR 1.1 06/04/2024    HGBA1C 6.2 (H) 09/23/2024    CRP 1.1 12/15/2022     Lab Results   Component Value Date    CALCIUM 9.7 12/30/2024    PHOS 3.3 11/07/2022      Lab Results   Component Value Date     "[id number] 1633 (H) 05/15/2024     Lab Results   Component Value Date    FOLATE 13.7 09/23/2024    No results found for: "UIBC", "IRON", "TRANS", "TRANSFERRIN", "TIBC", "LABIRON", "FESATURATED"   Lab Results   Component Value Date    EGFRNORACEVR >60.0 12/30/2024    ALBUMIN 3.4 (L) 12/30/2024     Lab Results   Component Value Date    YVYALIIO66YR 54 09/23/2024          Assessment:   74 y.o. female with multiple co-morbid illnesses here for continued follow up of medical problems.      The primary encounter diagnosis was Sensorineural hearing loss (SNHL) of right ear with unrestricted hearing of left ear. 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      Plan:   1. Sensorineural hearing loss (SNHL) of right ear with unrestricted hearing of left ear  Assessment & Plan:  Followed by Audiologist & external Otolaryngologist  Increasing hearing loss w/ Tinnitus  Balance therapy for 1 month left   Placing olive oil in ear to keep moisturized as instructed by Isaiah (Otolaryngologist)      2. Sudden idiopathic hearing loss of right ear with unrestricted hearing of left ear  Assessment & Plan:  Followed by External Otolaryngology (Isaiah) & Audiologist.   Being treated for " Vertigo; hearing aids not helping w/ hearing loss  Continue to monitor  Per patient receiving steroid shots in TM right side        3. Malignant neoplasm of upper-outer quadrant of right breast in female, estrogen receptor positive  Assessment & Plan:  Continue Tamoxifen as directed.   Continue self breast exams  Mammogram June 2025  F/U w/ Phylicia or Breast Surgeon as recommended        4. Mixed urge and stress incontinence  Assessment & Plan:  Trial pelvic floor therapy, but insurance won't cover while in balance therapy.   POCT UA positive  Started on Ceftin  Recommend 2 hour toileting  Limiting caffeine drinks, increase water intake  Discuss proper wiping/cleaning techniques     Orders:  -     POCT URINALYSIS  -     Urinalysis, Reflex to Urine Culture Urine, Clean Catch  -     RENAL FUNCTION PANEL; Future; Expected date: 02/10/2025    5. Hepatic fibrosis due to nonalcoholic fatty liver disease  Assessment & Plan:  Continue to monitor closely  Checking liver function tests  F/U w/ Hepatology      6. MDD (major depressive disorder), recurrent, in partial remission  Assessment & Plan:  Followed by external Psychiatrist Dr. Gill  Mood stable  Continue to monitor.       7. Other hyperlipidemia  Assessment & Plan:     Lab Results   Component Value Date    CHOL 172 09/23/2024    HDL 36 (L) 09/23/2024    LDLCALC 93.2 09/23/2024    TRIG 214 (H) 09/23/2024     Chronic, stable  Continue STATIN  Include vigorous activity and weight loss in healthcare plan  Focus on consumption of fruits, vegetables, fiber, and monounsaturated fats (DASH diet and Mediterranean Diet).       Orders:  -     LIPID PANEL; Future; Expected date: 02/10/2025    8. DISH (diffuse idiopathic skeletal hyperostosis)  Assessment & Plan:  Seen by Neurologist, being monitor  No pain  Will f/u       9. Acquired hypothyroidism  -     TSH; Future; Expected date: 02/10/2025  -     T4, FREE; Future; Expected date: 02/10/2025    10. Prediabetes  -      HEMOGLOBIN A1C; Future; Expected date: 02/10/2025    11. Encounter for antibody response examination  -     COVID-19 (SARS CoV-2) IgG Antibody Quant; Future; Expected date: 02/10/2025    12. Primary osteoarthritis involving multiple joints  -     Misc Sendout Test, Blood Vitamin D; Future; Expected date: 02/10/2025    13. B12 deficiency  -     VITAMIN B12; Future; Expected date: 02/10/2025    14. Body mass index (BMI) 32.0-32.9, adult  Assessment & Plan:  Recommend restarting exercise as balance improve.   Discuss portion control & increasing activity as tolerated       15. Balance problem  Assessment & Plan:  1 month left of Balance Therapy w/ PT  Being followed by ENT  Closely monitoring for vertigo  Unable to take Meclizine while in balance therapy      16. Acute cystitis with hematuria  Comments:  POCT Urine:   Postive Nitrite   Leukocytes large   Blood Present  Orders:  -     cefUROXime (CEFTIN) 500 MG tablet; Take 1 tablet (500 mg total) by mouth 2 (two) times daily.  Dispense: 14 tablet; Refill: 0    17. Malignant neoplasm of nipple of left breast in female, estrogen receptor positive  Assessment & Plan:  Mammogram due June 2025.   Will call if need new order  Followed by Breast Surgeon at [facility]       18. Chun's neuroma of both feet  Assessment & Plan:  Improving symptoms, but unable to continue due to insurance issues  Continue w/ ice, well-fitting shoes, rest  Continue Meloxicam           Health Maintenance         Date Due Completion Date    COVID-19 Vaccine (9 - 2024-25 season) 11/19/2024 9/24/2024    Hemoglobin A1c (Prediabetes) 09/23/2025 9/23/2024    DEXA Scan 05/12/2027 5/12/2022    TETANUS VACCINE 05/09/2029 5/9/2019    Lipid Panel 09/23/2029 9/23/2024            -Patient's lab results were reviewed and discussed with patient  -Treatment options and alternatives were discussed with the patient. Patient expressed understanding. Patient was given the opportunity to ask questions and be an  active participant in their medical care. Patient had no further questions or concerns at this time.       Follow up: Follow up in about 6 months (around 8/10/2025) for galina/ Nain .    Care Plan/Goals: Reviewed Yes   Goals         exercise (pt-stated)       Achievable - within patient's control: Yes    Difficulties Identified: don't like exercising indoors - getting started    Plan for Overcoming difficulties: set intention for 3x week chair yoga or cardio at [facility] or [facility] starting Thursd ya - plan to log so can monitor persistence    Timeframe for completion: 3 mos                      After visit summary printed and given to patient upon discharge.  Patient goals and care plan are included in After visit summary.    TOTAL TIME evaluating and managing this patient for this encounter was  67 minutes. This time was spent personally by me on some of the following activities: review of patient's past medical history, assessing age-appropriate health maintenance needs, review of any interval history, review and interpretation of lab results, review and interpretation of imaging test results, review and interpretation of cardiology test results, reviewing consulting specialist notes, obtaining history from the patient and family, examination of the patient, medication reconciliation, managing and/or ordering prescription medications, ordering imaging tests, ordering referral to subspecialty provider(s), educating patient and answering their questions about diagnosis, treatment plan, and goals of treatment, discussing planned follow-up and final documentation of the visit. This time was exclusive of any separately billable procedures for this patient and exclusive of time spent treating any other patients.     This note was generated with the assistance of ambient listening technology. Verbal consent was obtained by the patient and accompanying visitor(s) for the recording of patient appointment to facilitate this note. I  attest to having reviewed and edited the generated note for accuracy, though some syntax or spelling errors may persist. Please contact the author of this note for any clarification.              JESSICA Asencio, FNP-C  [facility] [address] [place] [address]   [phone]   [phone] fax

## 2025-02-10 NOTE — ASSESSMENT & PLAN NOTE
Followed by External Otolaryngology (Isaiah) & Audiologist.   Being treated for Vertigo; hearing aids not helping w/ hearing loss  Continue to monitor  Per patient receiving steroid shots in TM right side

## 2025-02-10 NOTE — ASSESSMENT & PLAN NOTE
Lab Results   Component Value Date    CHOL 172 09/23/2024    HDL 36 (L) 09/23/2024    LDLCALC 93.2 09/23/2024    TRIG 214 (H) 09/23/2024     Chronic, stable  Continue STATIN  Include vigorous activity and weight loss in healthcare plan  Focus on consumption of fruits, vegetables, fiber, and monounsaturated fats (DASH diet and Mediterranean Diet).

## 2025-02-10 NOTE — ASSESSMENT & PLAN NOTE
Mammogram due June 2025.   Will call if need new order  Followed by Breast Surgeon at [facility]

## 2025-02-10 NOTE — ASSESSMENT & PLAN NOTE
1 month left of Balance Therapy w/ PT  Being followed by ENT  Closely monitoring for vertigo  Unable to take Meclizine while in balance therapy

## 2025-02-11 LAB
25(OH)D3+25(OH)D2 SERPL-MCNC: 55 NG/ML (ref 30–96)
ALBUMIN SERPL BCP-MCNC: 3.8 G/DL (ref 3.5–5.2)
ANION GAP SERPL CALC-SCNC: 10 MMOL/L (ref 8–16)
BACTERIA #/AREA URNS AUTO: ABNORMAL /HPF
BILIRUB UR QL STRIP: NEGATIVE
BUN SERPL-MCNC: 24 MG/DL (ref 8–23)
CALCIUM SERPL-MCNC: 9.5 MG/DL (ref 8.7–10.5)
CHLORIDE SERPL-SCNC: 104 MMOL/L (ref 95–110)
CHOLEST SERPL-MCNC: 137 MG/DL (ref 120–199)
CHOLEST/HDLC SERPL: 3.8 {RATIO} (ref 2–5)
CLARITY UR REFRACT.AUTO: ABNORMAL
CO2 SERPL-SCNC: 23 MMOL/L (ref 23–29)
COLOR UR AUTO: YELLOW
CREAT SERPL-MCNC: 0.8 MG/DL (ref 0.5–1.4)
EST. GFR  (NO RACE VARIABLE): >60 ML/MIN/1.73 M^2
ESTIMATED AVG GLUCOSE: 114 MG/DL (ref 68–131)
GLUCOSE SERPL-MCNC: 101 MG/DL (ref 70–110)
GLUCOSE UR QL STRIP: NEGATIVE
HBA1C MFR BLD: 5.6 % (ref 4–5.6)
HDLC SERPL-MCNC: 36 MG/DL (ref 40–75)
HDLC SERPL: 26.3 % (ref 20–50)
HGB UR QL STRIP: ABNORMAL
HYALINE CASTS UR QL AUTO: 0 /LPF
KETONES UR QL STRIP: NEGATIVE
LDLC SERPL CALC-MCNC: 75.6 MG/DL (ref 63–159)
LEUKOCYTE ESTERASE UR QL STRIP: ABNORMAL
MICROSCOPIC COMMENT: ABNORMAL
NITRITE UR QL STRIP: POSITIVE
NONHDLC SERPL-MCNC: 101 MG/DL
PH UR STRIP: 6 [PH] (ref 5–8)
PHOSPHATE SERPL-MCNC: 3.5 MG/DL (ref 2.7–4.5)
POTASSIUM SERPL-SCNC: 4.6 MMOL/L (ref 3.5–5.1)
PROT UR QL STRIP: ABNORMAL
RBC #/AREA URNS AUTO: 32 /HPF (ref 0–4)
SARS-COV-2 IGG SERPL IA-ACNC: NORMAL AU/ML
SARS-COV-2 IGG SERPL QL IA: POSITIVE
SODIUM SERPL-SCNC: 137 MMOL/L (ref 136–145)
SP GR UR STRIP: 1.02 (ref 1–1.03)
SQUAMOUS #/AREA URNS AUTO: 1 /HPF
T4 FREE SERPL-MCNC: 0.96 NG/DL (ref 0.71–1.51)
TRIGL SERPL-MCNC: 127 MG/DL (ref 30–150)
TSH SERPL DL<=0.005 MIU/L-ACNC: 1.44 UIU/ML (ref 0.4–4)
URN SPEC COLLECT METH UR: ABNORMAL
VIT B12 SERPL-MCNC: 534 PG/ML (ref 210–950)
WBC #/AREA URNS AUTO: >100 /HPF (ref 0–5)
WBC CLUMPS UR QL AUTO: ABNORMAL

## 2025-02-13 LAB — BACTERIA UR CULT: ABNORMAL

## 2025-02-24 DIAGNOSIS — Z00.00 ENCOUNTER FOR MEDICARE ANNUAL WELLNESS EXAM: ICD-10-CM

## 2025-03-12 ENCOUNTER — OFFICE VISIT (OUTPATIENT)
Dept: PRIMARY CARE CLINIC | Facility: CLINIC | Age: 75
End: 2025-03-12
Payer: MEDICARE

## 2025-03-12 VITALS
WEIGHT: 186.19 LBS | HEART RATE: 81 BPM | SYSTOLIC BLOOD PRESSURE: 110 MMHG | BODY MASS INDEX: 32.98 KG/M2 | OXYGEN SATURATION: 97 % | DIASTOLIC BLOOD PRESSURE: 60 MMHG

## 2025-03-12 DIAGNOSIS — H90.A21 SENSORINEURAL HEARING LOSS (SNHL) OF RIGHT EAR WITH RESTRICTED HEARING OF LEFT EAR: Chronic | ICD-10-CM

## 2025-03-12 DIAGNOSIS — R93.1 ABNORMAL FINDINGS ON DIAGNOSTIC IMAGING OF HEART AND CORONARY CIRCULATION: ICD-10-CM

## 2025-03-12 DIAGNOSIS — M48.10 DISH (DIFFUSE IDIOPATHIC SKELETAL HYPEROSTOSIS): Chronic | ICD-10-CM

## 2025-03-12 DIAGNOSIS — G57.63 MORTON'S NEUROMA OF BOTH FEET: Chronic | ICD-10-CM

## 2025-03-12 DIAGNOSIS — G95.89 OTHER SPECIFIED DISEASES OF SPINAL CORD: Chronic | ICD-10-CM

## 2025-03-12 DIAGNOSIS — K76.0 HEPATIC FIBROSIS DUE TO NONALCOHOLIC FATTY LIVER DISEASE: Primary | Chronic | ICD-10-CM

## 2025-03-12 DIAGNOSIS — K74.00 HEPATIC FIBROSIS DUE TO NONALCOHOLIC FATTY LIVER DISEASE: Primary | Chronic | ICD-10-CM

## 2025-03-12 DIAGNOSIS — K76.0 NAFLD (NONALCOHOLIC FATTY LIVER DISEASE): Primary | ICD-10-CM

## 2025-03-12 DIAGNOSIS — F33.41 MDD (MAJOR DEPRESSIVE DISORDER), RECURRENT, IN PARTIAL REMISSION: Chronic | ICD-10-CM

## 2025-03-12 PROCEDURE — 99214 OFFICE O/P EST MOD 30 MIN: CPT | Mod: PBBFAC,PN | Performed by: INTERNAL MEDICINE

## 2025-03-12 PROCEDURE — 99215 OFFICE O/P EST HI 40 MIN: CPT | Mod: S$PBB,,, | Performed by: INTERNAL MEDICINE

## 2025-03-12 PROCEDURE — 99999 PR PBB SHADOW E&M-EST. PATIENT-LVL IV: CPT | Mod: PBBFAC,,, | Performed by: INTERNAL MEDICINE

## 2025-03-12 RX ORDER — FLUOCINOLONE ACETONIDE 0.11 MG/ML
5 OIL AURICULAR (OTIC) 2 TIMES DAILY PRN
Qty: 20 ML | Refills: 0 | Status: SHIPPED | OUTPATIENT
Start: 2025-03-12 | End: 2025-06-10

## 2025-03-12 NOTE — ASSESSMENT & PLAN NOTE
S/p consult w RAAFEL Galicia @ [facility] - no pain, minimal radiuculopathy, no loss of strength or function - cont monitor - avoid activities that might traumatize C-spine

## 2025-03-12 NOTE — PATIENT INSTRUCTIONS
If you are feeling unwell, we'd like to be the first ones to know here at [facility]! Please give us a call. Same day appointments are our top priority to keep you well and out of the emergency rooms and hospitals. Call [phone] for our direct line. After hours advice is always available. Please call [phone] after hours to speak to the on-call team.      Consider Covid vaccine that can be scheduled at your pharmacy of choice.    Recommend contact your Otolaryngologist about continued issues w R ear

## 2025-03-12 NOTE — ASSESSMENT & PLAN NOTE
Followed by external Psychiatrist Dr. Gill - doing well with transition from venlafaxine to fluoxetine - encourage stay mobile and active to extent able

## 2025-03-12 NOTE — PROGRESS NOTES
Alison Lobo  03/12/2025  [id number]    Roselia Gillette MD  Patient Care Team:  Roselia Gillette MD as PCP - General (Internal Medicine)  Ishan Whatley MD as Consulting Physician (Breast Surgery)    Visit Type: Follow-up    Ms. Alison Lobo is a 74 year old female here for scheduled f/u.      Chronic medical issues include prediabetes, B Chun's neuroma/foot pain, JADA (CPAP), obesity, R breast cancer 2002, h/o L breast cancer 2018 (both small invasive ductal carcinoma), recurrent MDD, MEHNAZ, HLD, hypothyroid, DISH, hepatic fibrosis due to NAFLD.    History of Present Illness    CHIEF COMPLAINT:  Ms. Lobo presents today for follow up of right ear hearing loss.    BILATERAL HEARING LOSS:  She was evaluated by ENT Dr. Fitz JONAS for right ear hearing loss and received treatment with 2 weeks of oral steroids followed by 3 intratympanic steroid injections. She has regained minimal hearing in the right ear, though ENT is not optimistic about further improvement. She experiences right ear fullness that worsens throughout the day but improves in the morning and when lying down. Also reports persistent tinnitus and occasional earache on the right. She initially experienced balance issues, nausea, and dizziness which have since improved with vestibular therapy @ [facility]. She also reports mild hearing loss in the left ear. She has trialed two sets of hearing aids, reporting they only amplify sound without improving clarity. She is working with an audiologist but feels her condition has not yet stabilized sufficiently to proceed with final hearing aid fitting. She reports relief of dry/itching right ear canal with Fluocinolone 0.01% ear drops as needed.     CERVICAL SPINE:  She has cervical stenosis described as significant by neurosurgeon. She reports history of pinched nerve in cervical spine in November with minimal residual numbness in right fifth finger only. Denies pain or loss of  "function.    LIVER:  Previous fibroscan showed severe steatosis with advanced fibrosis. Recent labs were reportedly normal per hepatologist's assessment. Has not yet established w new Hepatologist, Dr. Marie and overdue for surveillance US.     MUSCULOSKELETAL:  Would like to start PT for Chun's neuroma once vestibular therapy completed.     EXERCISE:  She has resumed gym attendance after a period of being limited to walking for exercise.    MEDICATIONS:  She continues Metoprolol with good effect. She is doing well with transition from venlafaxine to fluoxetine.     COVID HISTORY:  She had a positive COVID antibodies test approximately one month ago. Last COVID vaccine was administered in late September 2024.    From LOV w me 9/24/24  Incidental finding of DISH in thoracic spine on recent CXR  She has established w external Psychiatrist and [facility]  PSYCHIATRIC MEDICATIONS:  She is transitioning from venlafaxine to fluoxetine under psychiatric care. Currently taking venlafaxine 75mg daily, decreasing to 37.5mg, and fluoxetine 20mg daily. She also takes trazodone 50mg as needed for sleep. She reports mood improvement with recent changes, feeling like "a cloud has lifted," particularly with venlafaxine reduction. She was previously taking 300mg venlafaxine daily and feels she may have been overmedicated.  PRE-DIABETES MANAGEMENT:  Note increase in A1C but remaining in the pre-diabetic range. Currently taking Metformin 1500 mg daily with good tolerance. She is in agreement w increasing metformin to 2000 mg daily. She acknowledges the need to improve eating habits, noting fluctuations between lack of interest in food and excessive chocolate consumption. She denies prior diabetes diagnosis and expresses desire to prevent progression from pre-diabetes.  SUPPLEMENTS AND PAIN MANAGEMENT:  She continues B complex supplement due to previously "low normal" B12 and folate levels. Recent " labs shows improved folate and B12 levels. She takes turmeric 1400mg daily (two tablets) and has reduced meloxicam from two 7.5 mg tabs to just one 7.5mg tab daily. Discussed potential kidney or liver effects or increased bleeding risk of meloxicam and with turmeric. She's considering increasing turmeric to compensate for meloxicam reduction. She also takes zinc 50 mg for COVID-19 prevention.  MUSCULOSKELETAL CONCERNS:  She reports back stiffness, decreased flexibility (particularly in lateral movements), and mid-back pain. Initially attributed to gym overexertion, but symptoms have persisted. Discussed today new finding of DISH (Diffuse Idiopathic Skeletal Hyperostosis) on recent imaging. She has noticed reduced flexibility during physical activities. She also reports ongoing foot issues related to Chun's neuroma.   NON-ALCOHOLIC FATTY LIVER DISEASE:  She has non-alcoholic fatty liver disease with a low MELD score of 8 on a scale of 6-40. She reports occasional alcohol consumption despite previous advice against it. She understands the need for weight loss and is adopting a Mediterranean diet to manage her condition. She denies significant liver concerns but recognizes the importance of not treating it lightly.  CARDIOVASCULAR HEALTH:  Reviewed cardiac risk factors w intermediate ACSVD risk score of 15.8%. She feels she is at lower risk for cardiovascular event aside from age as her cardiologist sees her just annually and has commented that her primary risk factor is age. She denies current cardiovascular symptoms or concerns.  RESPIRATORY AND ENT:  She reports slight pain and burning sensation in her sinuses, using intra-nasal wash for symptom management. She denies congestion. She reports no hearing issues or current ear problems such as pain, fullness, or popping sensations.  COVID-19 PREVENTION:  She plans to receive the updated COVID vaccine in the near future.   SLEEP:  She reports a history of restless leg  symptoms diagnosed during a previous sleep study, but denies awareness of RLS symptoms during waking hours or disrupting sleep.  PHQ-4 Score: 4     Recent appointments:   2/10/25 [facility]  1/15/25 NeuroSurg Dr. Bridget Galicia [facility] [place]  1/09/25 Otolaryngology Dr. Isaiah JONAS  1/09/25 Audiology [facility]  12/18/24 Breast Surgeon Dr. Whatley/Marvel, NP  12/16/24 Psychiatry Dr. AMOR Gill [facility]  12/12/24 Otolaryngology Dr. Isaiah JONAS  12/0/24 Audiology [place]    Upcoming appointments:  Future Appointments       Date Provider Specialty Appt Notes    3/13/2025  Radiology Hepatic fibrosis due to nonalcoholic fatty liver disease [K76.0, K74.00]    4/9/2025 Juani Hernandez FNP-C Primary Care [facility]    7/28/2025 Roselia Gillette MD Primary Care 6 month f/u           The following were reviewed: Active problem list, medication list, allergies, family history, social history, and Health Maintenance.     Medications have been reviewed and reconciled with patient at visit today.    Exam: sat 97% temp 97.1  Vitals:    03/12/25 0815   BP: 110/60   Pulse: 81     Weight: 84.5 kg (186 lb 2.9 oz)   Body mass index is 32.98 kg/m².    BP Readings from Last 3 Encounters:   03/12/25 110/60   02/10/25 (!) 122/58   09/24/24 122/64      Wt Readings from Last 3 Encounters:   03/12/25 0815 84.5 kg (186 lb 2.9 oz)   02/10/25 1030 83.4 kg (183 lb 15.6 oz)   09/24/24 1315 87.2 kg (192 lb 5.6 oz)      Physical Exam  Vitals reviewed.   Constitutional:       General: She is not in acute distress.     Appearance: Normal appearance. She is obese. She is not ill-appearing.   HENT:      Head: Normocephalic and atraumatic.      Right Ear: Ear canal and external ear normal.      Left Ear: External ear normal.      Ears:      Comments: Abnormal TM's B  Dry R ear canal - no lesion or flaking      Nose: Nose normal.      Mouth/Throat:      Mouth: Mucous membranes are moist.      Pharynx: Oropharynx is clear. No oropharyngeal exudate or posterior oropharyngeal  "erythema.   Eyes:      General: No scleral icterus.     Extraocular Movements: Extraocular movements intact.      Conjunctiva/sclera: Conjunctivae normal.   Neck:      Vascular: No carotid bruit.   Cardiovascular:      Rate and Rhythm: Normal rate and regular rhythm.      Heart sounds: No murmur heard.  Pulmonary:      Effort: Pulmonary effort is normal. No respiratory distress.      Breath sounds: No wheezing, rhonchi or rales.   Abdominal:      General: Bowel sounds are normal. There is no distension.      Palpations: Abdomen is soft.      Tenderness: There is no guarding.      Comments: Mild tenderness on palpation RUQ   Musculoskeletal:      Comments: Trace B pitting LE edema   Lymphadenopathy:      Cervical: No cervical adenopathy.   Skin:     General: Skin is warm and dry.      Findings: No bruising or rash.   Neurological:      Mental Status: She is alert and oriented to person, place, and time. Mental status is at baseline.      Comments: Mild vertigo moving off of exam table  Ambulates independently w/o AD   Psychiatric:         Behavior: Behavior normal.       Laboratory Reviewed  Lab Results   Component Value Date    WBC 5.66 12/30/2024    HGB 13.0 12/30/2024    HCT 41.0 12/30/2024     12/30/2024     (H) 12/30/2024    CHOL 137 02/10/2025    TRIG 127 02/10/2025    HDL 36 (L) 02/10/2025    LDLCALC 75.6 02/10/2025    ALT 25 12/30/2024    AST 30 12/30/2024     02/10/2025    K 4.6 02/10/2025     02/10/2025    CREATININE 0.8 02/10/2025    BUN 24 (H) 02/10/2025    CO2 23 02/10/2025    MG 2.2 01/18/2024    TSH 1.438 02/10/2025    FREET4 0.96 02/10/2025    INR 1.1 06/04/2024    HGBA1C 5.6 02/10/2025    CRP 1.1 12/15/2022     Lab Results   Component Value Date    CALCIUM 9.5 02/10/2025    PHOS 3.5 02/10/2025      Lab Results   Component Value Date    IHGFPPEP27 534 02/10/2025     Lab Results   Component Value Date    FOLATE 13.7 09/23/2024    No results found for: "UIBC", "IRON", "TRANS", " ""TRANSFERRIN", "TIBC", "LABIRON", "FESATURATED"   Lab Results   Component Value Date    EGFRNORACEVR >60.0 02/10/2025    ALBUMIN 3.8 02/10/2025     Lab Results   Component Value Date    XPUIHVLD29TT 55 02/10/2025      2/10/25 urinalysis protein 1+ blood 2+ nit + LE 3+  Ucx > 100,000 ecoli (treated cefuroxime)    Assessment:   74 y.o. female with multiple co-morbid illnesses here for continued follow up of medical problems.      The primary encounter diagnosis was Hepatic fibrosis due to nonalcoholic fatty liver disease. Diagnoses of DISH (diffuse idiopathic skeletal hyperostosis), Chun's neuroma of both feet, Sensorineural hearing loss (SNHL) of right ear with restricted hearing of left ear, MDD (major depressive disorder), recurrent, in partial remission, and Other specified diseases of spinal cord were also pertinent to this visit.      Plan:   1. Hepatic fibrosis due to nonalcoholic fatty liver disease  Assessment & Plan:  Abd US - message forwarded to Hepatology regarding re-establishing care    Orders:  -     US Abdomen Limited_Liver; Future; Expected date: 03/12/2025    2. DISH (diffuse idiopathic skeletal hyperostosis)  Assessment & Plan:  Encourage stay mobile and active to extent able      3. Chun's neuroma of both feet  Assessment & Plan:  Would like to start PT for Chun's neuroma once vestibular therapy completed.       4. Sensorineural hearing loss (SNHL) of right ear with restricted hearing of left ear  Assessment & Plan:  Recommended to contact Otolaryngologist about continued issues w R ear      5. MDD (major depressive disorder), recurrent, in partial remission  Assessment & Plan:  Followed by external Psychiatrist Dr. Gill - doing well with transition from venlafaxine to fluoxetine - encourage stay mobile and active to extent able      6. Other specified diseases of spinal cord  Overview:  MRI C 10/21/24: diffuse DJD changes, C3-4 DJD, L HNP, L FS, C4-5 DJD/HNP, mod CS, B FS, C5-6 DJD/HNP, " mod/severe CS, B FS, C6-7 DJD/HNP, mod CS, B FS, C7-T1 bulge, B FS     Assessment & Plan:  S/p consult w RAFAEL Galicia @ [facility] [place] - no pain, minimal radiuculopathy, no loss of strength or function - cont monitor - avoid activities that might traumatize C-spine      Other orders  -     fluocinolone acetonide oiL 0.01 % Drop; Place 5 drops in ear(s) 2 (two) times daily as needed (dry ear).  Dispense: 20 mL; Refill: 0         Health Maintenance         Date Due Completion Date    COVID-19 Vaccine (9 - 2024-25 season) 11/19/2024 9/24/2024    Hemoglobin A1c (Prediabetes) 02/10/2026 2/10/2025    DEXA Scan 05/12/2027 5/12/2022    TETANUS VACCINE 05/09/2029 5/9/2019    Lipid Panel 02/10/2030 2/10/2025          -Patient's lab results were reviewed and discussed with patient  -Treatment options and alternatives were discussed with the patient. Patient expressed understanding. Patient was given the opportunity to ask questions and be an active participant in their medical care. Patient had no further questions or concerns at this time.     Follow up: Follow up in about 1 month (around 4/12/2025) for Follow Up cherry Hernandez and galina dahl 7/28/25 as scheduled.    Care Plan/Goals: Reviewed Yes   Goals         exercise (pt-stated)       Achievable - within patient's control: Yes    Difficulties Identified: don't like exercising indoors - getting started    Plan for Overcoming difficulties: set intention for 3x week chair yoga or cardio at [facility] starting Thursd ya - plan to log so can monitor persistence    Timeframe for completion: 3 mos                    After visit summary printed and given to patient upon discharge.  Patient goals and care plan are included in After visit summary.    TOTAL TIME evaluating and managing this patient for this encounter was 60 minutes. This time was spent personally by me on some of the following activities: review of patient's past medical history, assessing age-appropriate health maintenance  needs, review of any interval history, review and interpretation of lab results, review and interpretation of imaging test results, review and interpretation of cardiology test results, reviewing consulting specialist notes, obtaining history from the patient and family, examination of the patient, medication reconciliation, managing and/or ordering prescription medications, ordering imaging tests, ordering referral to subspecialty provider(s), educating patient and answering their questions about diagnosis, treatment plan, and goals of treatment, discussing planned follow-up and final documentation of the visit. This time was exclusive of any separately billable procedures for this patient and exclusive of time spent treating any other patients.     This note was generated with the assistance of ambient listening technology. Verbal consent was obtained by the patient and accompanying visitor(s) for the recording of patient appointment to facilitate this note. I attest to having reviewed and edited the generated note for accuracy, though some syntax or spelling errors may persist. Please contact the author of this note for any clarification.

## 2025-03-25 ENCOUNTER — HOSPITAL ENCOUNTER (OUTPATIENT)
Dept: RADIOLOGY | Facility: HOSPITAL | Age: 75
Discharge: HOME | End: 2025-03-25
Attending: INTERNAL MEDICINE
Payer: MEDICARE

## 2025-03-25 ENCOUNTER — RESULTS FOLLOW-UP (OUTPATIENT)
Dept: HEPATOLOGY | Facility: CLINIC | Age: 75
End: 2025-03-25

## 2025-03-25 DIAGNOSIS — K76.0 NAFLD (NONALCOHOLIC FATTY LIVER DISEASE): ICD-10-CM

## 2025-03-25 PROCEDURE — 76705 ECHO EXAM OF ABDOMEN: CPT | Mod: TC

## 2025-03-25 PROCEDURE — 76705 ECHO EXAM OF ABDOMEN: CPT | Mod: 26,,, | Performed by: STUDENT IN AN ORGANIZED HEALTH CARE EDUCATION/TRAINING PROGRAM

## 2025-04-09 ENCOUNTER — OFFICE VISIT (OUTPATIENT)
Dept: PRIMARY CARE CLINIC | Facility: CLINIC | Age: 75
End: 2025-04-09
Payer: MEDICARE

## 2025-04-09 VITALS
OXYGEN SATURATION: 97 % | WEIGHT: 184 LBS | HEIGHT: 63 IN | SYSTOLIC BLOOD PRESSURE: 110 MMHG | DIASTOLIC BLOOD PRESSURE: 54 MMHG | HEART RATE: 81 BPM | BODY MASS INDEX: 32.6 KG/M2 | TEMPERATURE: 97 F

## 2025-04-09 DIAGNOSIS — Z17.0 MALIGNANT NEOPLASM OF UPPER-OUTER QUADRANT OF RIGHT BREAST IN FEMALE, ESTROGEN RECEPTOR POSITIVE: Chronic | ICD-10-CM

## 2025-04-09 DIAGNOSIS — R73.03 PREDIABETES: Chronic | ICD-10-CM

## 2025-04-09 DIAGNOSIS — K76.0 HEPATIC FIBROSIS DUE TO NONALCOHOLIC FATTY LIVER DISEASE: Chronic | ICD-10-CM

## 2025-04-09 DIAGNOSIS — F33.41 MDD (MAJOR DEPRESSIVE DISORDER), RECURRENT, IN PARTIAL REMISSION: Chronic | ICD-10-CM

## 2025-04-09 DIAGNOSIS — G57.63 MORTON'S NEUROMA OF BOTH FEET: Chronic | ICD-10-CM

## 2025-04-09 DIAGNOSIS — C50.012 MALIGNANT NEOPLASM OF NIPPLE OF LEFT BREAST IN FEMALE, ESTROGEN RECEPTOR POSITIVE: ICD-10-CM

## 2025-04-09 DIAGNOSIS — R26.89 BALANCE PROBLEM: Chronic | ICD-10-CM

## 2025-04-09 DIAGNOSIS — K74.00 HEPATIC FIBROSIS DUE TO NONALCOHOLIC FATTY LIVER DISEASE: Chronic | ICD-10-CM

## 2025-04-09 DIAGNOSIS — E78.49 OTHER HYPERLIPIDEMIA: Chronic | ICD-10-CM

## 2025-04-09 DIAGNOSIS — H91.21 SUDDEN IDIOPATHIC HEARING LOSS OF RIGHT EAR WITH UNRESTRICTED HEARING OF LEFT EAR: ICD-10-CM

## 2025-04-09 DIAGNOSIS — Z00.00 ENCOUNTER FOR MEDICARE ANNUAL WELLNESS EXAM: Primary | ICD-10-CM

## 2025-04-09 DIAGNOSIS — C50.411 MALIGNANT NEOPLASM OF UPPER-OUTER QUADRANT OF RIGHT BREAST IN FEMALE, ESTROGEN RECEPTOR POSITIVE: Chronic | ICD-10-CM

## 2025-04-09 DIAGNOSIS — H90.A21 SENSORINEURAL HEARING LOSS (SNHL) OF RIGHT EAR WITH RESTRICTED HEARING OF LEFT EAR: ICD-10-CM

## 2025-04-09 DIAGNOSIS — E03.9 ACQUIRED HYPOTHYROIDISM: Chronic | ICD-10-CM

## 2025-04-09 DIAGNOSIS — Z17.0 MALIGNANT NEOPLASM OF NIPPLE OF LEFT BREAST IN FEMALE, ESTROGEN RECEPTOR POSITIVE: ICD-10-CM

## 2025-04-09 PROCEDURE — 99999 PR PBB SHADOW E&M-EST. PATIENT-LVL IV: CPT | Mod: PBBFAC,,,

## 2025-04-09 PROCEDURE — 99214 OFFICE O/P EST MOD 30 MIN: CPT | Mod: PBBFAC,PN

## 2025-04-09 RX ORDER — HYDROCHLOROTHIAZIDE 12.5 MG/1
1 CAPSULE ORAL EVERY MORNING
COMMUNITY
Start: 2025-03-21 | End: 2026-03-21

## 2025-04-09 NOTE — PATIENT INSTRUCTIONS
Counseling and Referral of Other Preventative  (Italic type indicates deductible and co-insurance are waived)    Patient Name: Alison Lobo  Today's Date: 4/9/2025    Health Maintenance       Date Due Completion Date    COVID-19 Vaccine (9 - 2024-25 season) 11/19/2024 9/24/2024    Hemoglobin A1c (Prediabetes) 02/10/2026 2/10/2025    DEXA Scan 05/12/2027 5/12/2022    TETANUS VACCINE 05/09/2029 5/9/2019    Lipid Panel 02/10/2030 2/10/2025        No orders of the defined types were placed in this encounter.    The following information is provided to all patients.  This information is to help you find resources for any of the problems found today that may be affecting your health:                  Living healthy guide: www.[facility].[place].gov      Understanding Diabetes: www.diabetes.org      Eating healthy: www.cdc.gov/healthyweight      CDC home safety checklist: www.cdc.gov/steadi/patient.html      Agency on Aging: www.goea.[place].gov      Alcoholics anonymous (AA): www.aa.org      Physical Activity: www.arsh.nih.gov/xy2bkzj      Tobacco use: www.quitwithusla.org

## 2025-04-09 NOTE — PROGRESS NOTES
"  Alison Lobo presented for a follow-up Medicare AWV today. The following components were reviewed and updated:    Medical history  Family History  Social history  Allergies and Current Medications  Health Risk Assessment  Health Maintenance  Care Team    **See Completed Assessments for Annual Wellness visit with in the encounter summary    The following assessments were completed:  Depression Screening  Cognitive function Screening  Timed Get Up Test  Whisper Test  Nutrition Screening      Time asked: 10 minutes after 11 o'clock (11:10)    Opioid documentation for eAWV      Patient does not have a current opioid prescription.         Vitals:    04/09/25 0906   BP: (!) 110/54   BP Location: Left arm   Patient Position: Sitting   Pulse: 81   Temp: 96.5 °F (35.8 °C)   TempSrc: Temporal   SpO2: 97%   Weight: 83.4 kg (183 lb 15.6 oz)   Height: 5' 3" (1.6 m)     Body mass index is 32.59 kg/m².                Physical Exam  Vitals reviewed.   Constitutional:       Appearance: Normal appearance. She is normal weight.   HENT:      Head: Normocephalic.      Right Ear: Tympanic membrane, ear canal and external ear normal.      Left Ear: Tympanic membrane, ear canal and external ear normal.      Nose: Nose normal.      Mouth/Throat:      Mouth: Mucous membranes are moist.      Pharynx: Oropharynx is clear.   Eyes:      Extraocular Movements: Extraocular movements intact.      Conjunctiva/sclera: Conjunctivae normal.      Pupils: Pupils are equal, round, and reactive to light.   Cardiovascular:      Rate and Rhythm: Normal rate and regular rhythm.      Pulses: Normal pulses.      Heart sounds: Normal heart sounds.   Pulmonary:      Effort: Pulmonary effort is normal.      Breath sounds: Normal breath sounds.   Abdominal:      General: Bowel sounds are normal.      Palpations: Abdomen is soft.   Musculoskeletal:         General: Normal range of motion.      Cervical back: Normal range of motion and neck supple.   Skin:     " General: Skin is warm and dry.      Capillary Refill: Capillary refill takes less than 2 seconds.   Neurological:      General: No focal deficit present.      Mental Status: She is alert and oriented to person, place, and time. Mental status is at baseline.   Psychiatric:         Mood and Affect: Mood normal.         Behavior: Behavior normal.         Thought Content: Thought content normal.         Judgment: Judgment normal.               Health Maintenance         Date Due Completion Date    COVID-19 Vaccine (9 - 2024-25 season) 11/19/2024 9/24/2024    Hemoglobin A1c (Prediabetes) 02/10/2026 2/10/2025    DEXA Scan 05/12/2027 5/12/2022    TETANUS VACCINE 05/09/2029 5/9/2019    Lipid Panel 02/10/2030 2/10/2025              PROBLEM LIST ITEMS ADDRESSED THIS VISIT:    1. Encounter for Medicare annual wellness exam  Assessment & Plan:  Recommended age appropirate screening   Recommended vaccines   Review for Opioid Screening: Pt does not have Rx for Opioids    Review for Substance Use Disorders: Patient does not use illicit substances as reported        2. Chun's neuroma of both feet  Assessment & Plan:  Stable  Continue to closely watch  Recommend proper fitting shoes and exercise as tolerated       3. Sensorineural hearing loss (SNHL) of right ear with restricted hearing of left ear  Assessment & Plan:  Stable  No wearing hearing aids  Advise to f/u w/ Otolaryngologist       4. Sudden idiopathic hearing loss of right ear with unrestricted hearing of left ear  Assessment & Plan:  Stable   Improving dizziness w/ vestibular PT   Continue to monitor       5. MDD (major depressive disorder), recurrent, in partial remission  Assessment & Plan:  Stable mood  Continue fluoxetine as directed  F/U w/ psychiatry  Recommend interaction w/ family/friends  Continue doing artwork       6. Other hyperlipidemia  Assessment & Plan:     Lab Results   Component Value Date    CHOL 137 02/10/2025    HDL 36 (L) 02/10/2025    LDLCALC 75.6  02/10/2025    TRIG 127 02/10/2025     Chronic, stable  Continue STATIN  Include vigorous activity and weight loss in healthcare plan  Focus on consumption of fruits, vegetables, fiber, and monounsaturated fats (DASH diet and Mediterranean Diet).         7. Malignant neoplasm of upper-outer quadrant of right breast in female, estrogen receptor positive  Assessment & Plan:  Continue Tamoxifen as directed.   Continue self breast exams  Mammogram June 2025  F/U w/ Phylicia or Breast Surgeon as recommended    Continue surveillance       8. Malignant neoplasm of nipple of left breast in female, estrogen receptor positive  Assessment & Plan:  Mammogram due June 2025.   Follow[facility] by [facility] Sur[facility]   Stable  Continue surveillance       9. Acquired hypothyroidism  Assessment & Plan:  Lab Results   Component Value Date    TSH 1.438 02/10/2025   Continue current treatment plan  Stable         10. Prediabetes  Assessment & Plan:  Hemoglobin A1C   Date Value Ref Range Status   02/10/2025 5.6 4.0 - 5.6 % Final     Comment:     ADA Screening Guidelines:  5.7-6.4%  Consistent with prediabetes  >or=6.5%  Consistent with diabetes    High levels of fetal hemoglobin interfere with the HbA1C  assay. Heterozygous hemoglobin variants (HbS, HgC, etc)do  not significantly interfere with this assay.   However, presence of multiple variants may affect accuracy.     09/23/2024 6.2 (H) 4.0 - 5.6 % Final     Comment:     ADA Screening Guidelines:  5.7-6.4%  Consistent with prediabetes  >or=6.5%  Consistent with diabetes    High levels of fetal hemoglobin interfere with the HbA1C  assay. Heterozygous hemoglobin variants (HbS, HgC, etc)do  not significantly interfere with this assay.   However, presence of multiple variants may affect accuracy.     01/18/2024 5.5 4.0 - 5.6 % Final     Comment:     ADA Screening Guidelines:  5.7-6.4%  Consistent with prediabetes  >or=6.5%  Consistent with diabetes    High levels of fetal hemoglobin  interfere with the HbA1C  assay. Heterozygous hemoglobin variants (HbS, HgC, etc)do  not significantly interfere with this assay.   However, presence of multiple variants may affect accuracy.      Stable  Discuss diet and exercise       11. Hepatic fibrosis due to nonalcoholic fatty liver disease  Assessment & Plan:  Stable  US abdomen 03/25/25  FINDINGS:  Liver: Mildly prominent liver measuring 16.28 cm. Diffusely hyperechoic echotexture.  No focal hepatic lesions.     Gallbladder: No calculi, wall thickening, or pericholecystic fluid.  Hyperechoic focus in the wall with mild comet tail artifact.  No sonographic Mejia's sign.     Biliary system: The common duct is not dilated, measuring 3.2 mm.  No intrahepatic ductal dilatation.     Spleen: Normal in size and echotexture, measuring 10.2 x 3.6 cm.     Miscellaneous: No upper abdominal ascites.     Impression:     1. Hepatic steatosis.  2. Findings suggestive of adenomyomatosis involving the gallbladder wall.    No change in current treatment plan.      12. Balance problem  Assessment & Plan:  Continue Balance Therapy w/ PT  Being followed by ENT  Closely monitoring for vertigo  Unable to take Meclizine while in balance therapy                Provided Alison with a 5-10 year written screening schedule and personal prevention plan. Recommendations were developed using the USPSTF age appropriate recommendations. Education, counseling, and referrals were provided as needed.  After Visit Summary printed and given to patient which includes a list of additional screenings\tests needed.        Future Appointments   Date Time Provider Department Center   4/22/2025  2:00 PM Juanita Stein NP [facility] HEPAT [facility]   7/28/2025 11:00 AM Roselia Gillette MD [id number]              JESSICA Asencio, FNP-C  [facility] [address] [place] [id number] [phone] ph  [phone] fax    I offered to discuss advanced care  planning, including how to pick a person who would make decisions for you if you were unable to make them for yourself, called a health care power of , and what kind of decisions you might make such as use of life sustaining treatments such as ventilators and tube feeding when faced with a life limiting illness recorded on a living will that they will need to know. (How you want to be cared for as you near the end of your natural life)     X  Patient has advanced directives on file, which we reviewed, and they do not wish to make changes.

## 2025-04-09 NOTE — ASSESSMENT & PLAN NOTE
Recommended age appropirate screening   Recommended vaccines   Review for Opioid Screening: Pt does not have Rx for Opioids    Review for Substance Use Disorders: Patient does not use illicit substances as reported

## 2025-04-09 NOTE — ASSESSMENT & PLAN NOTE
Continue Balance Therapy w/ PT  Being followed by ENT  Closely monitoring for vertigo  Unable to take Meclizine while in balance therapy

## 2025-04-11 NOTE — ASSESSMENT & PLAN NOTE
Hemoglobin A1C   Date Value Ref Range Status   02/10/2025 5.6 4.0 - 5.6 % Final     Comment:     ADA Screening Guidelines:  5.7-6.4%  Consistent with prediabetes  >or=6.5%  Consistent with diabetes    High levels of fetal hemoglobin interfere with the HbA1C  assay. Heterozygous hemoglobin variants (HbS, HgC, etc)do  not significantly interfere with this assay.   However, presence of multiple variants may affect accuracy.     09/23/2024 6.2 (H) 4.0 - 5.6 % Final     Comment:     ADA Screening Guidelines:  5.7-6.4%  Consistent with prediabetes  >or=6.5%  Consistent with diabetes    High levels of fetal hemoglobin interfere with the HbA1C  assay. Heterozygous hemoglobin variants (HbS, HgC, etc)do  not significantly interfere with this assay.   However, presence of multiple variants may affect accuracy.     01/18/2024 5.5 4.0 - 5.6 % Final     Comment:     ADA Screening Guidelines:  5.7-6.4%  Consistent with prediabetes  >or=6.5%  Consistent with diabetes    High levels of fetal hemoglobin interfere with the HbA1C  assay. Heterozygous hemoglobin variants (HbS, HgC, etc)do  not significantly interfere with this assay.   However, presence of multiple variants may affect accuracy.      Stable  Discuss diet and exercise

## 2025-04-11 NOTE — ASSESSMENT & PLAN NOTE
Lab Results   Component Value Date    CHOL 137 02/10/2025    HDL 36 (L) 02/10/2025    LDLCALC 75.6 02/10/2025    TRIG 127 02/10/2025     Chronic, stable  Continue STATIN  Include vigorous activity and weight loss in healthcare plan  Focus on consumption of fruits, vegetables, fiber, and monounsaturated fats (DASH diet and Mediterranean Diet).

## 2025-04-11 NOTE — ASSESSMENT & PLAN NOTE
Stable  US abdomen 03/25/25  FINDINGS:  Liver: Mildly prominent liver measuring 16.28 cm. Diffusely hyperechoic echotexture.  No focal hepatic lesions.     Gallbladder: No calculi, wall thickening, or pericholecystic fluid.  Hyperechoic focus in the wall with mild comet tail artifact.  No sonographic Mejia's sign.     Biliary system: The common duct is not dilated, measuring 3.2 mm.  No intrahepatic ductal dilatation.     Spleen: Normal in size and echotexture, measuring 10.2 x 3.6 cm.     Miscellaneous: No upper abdominal ascites.     Impression:     1. Hepatic steatosis.  2. Findings suggestive of adenomyomatosis involving the gallbladder wall.    No change in current treatment plan.

## 2025-04-11 NOTE — ASSESSMENT & PLAN NOTE
Lab Results   Component Value Date    TSH 1.438 02/10/2025   Continue current treatment plan  Stable

## 2025-04-11 NOTE — ASSESSMENT & PLAN NOTE
Continue Tamoxifen as directed.   Continue self breast exams  Mammogram June 2025  F/U w/ Phylicia or Breast Surgeon as recommended    Continue surveillance

## 2025-04-11 NOTE — ASSESSMENT & PLAN NOTE
Stable mood  Continue fluoxetine as directed  F/U w/ psychiatry  Recommend interaction w/ family/friends  Continue doing artwork

## 2025-04-11 NOTE — ASSESSMENT & PLAN NOTE
Mammogram due June 2025.   Followed by Breast Surgeon at [facility]   Stable  Continue surveillance

## 2025-05-02 ENCOUNTER — OFFICE VISIT (OUTPATIENT)
Facility: CLINIC | Age: 75
End: 2025-05-02
Payer: MEDICARE

## 2025-05-02 DIAGNOSIS — R73.03 PREDIABETES: Chronic | ICD-10-CM

## 2025-05-02 DIAGNOSIS — E66.811 CLASS 1 OBESITY WITH SERIOUS COMORBIDITY AND BODY MASS INDEX (BMI) OF 32.0 TO 32.9 IN ADULT, UNSPECIFIED OBESITY TYPE: ICD-10-CM

## 2025-05-02 DIAGNOSIS — K74.02 HEPATIC FIBROSIS, STAGE 3: ICD-10-CM

## 2025-05-02 DIAGNOSIS — K76.0 METABOLIC DYSFUNCTION-ASSOCIATED STEATOTIC LIVER DISEASE (MASLD): Primary | ICD-10-CM

## 2025-05-02 DIAGNOSIS — Z79.810 LONG-TERM CURRENT USE OF TAMOXIFEN: ICD-10-CM

## 2025-05-02 DIAGNOSIS — E78.49 OTHER HYPERLIPIDEMIA: Chronic | ICD-10-CM

## 2025-05-02 PROCEDURE — 98006 SYNCH AUDIO-VIDEO EST MOD 30: CPT | Mod: 95,,, | Performed by: NURSE PRACTITIONER

## 2025-05-02 NOTE — Clinical Note
Hi, I have another patient needing a Fibroscan. Any chance she can be squeezed in this month or early June? She meets with her Oncologist at the end of June and I would like to have the results before that visit. Thanks! -Juanita

## 2025-05-02 NOTE — PROGRESS NOTES
[facility] - Established Patient    Last Clinic Visit: 11/28/23    Chief Complaint: Follow-up for fatty liver        HISTORY     This is a 74 y.o. female with PMH noted below, here for follow-up of MASLD with hepatic fibrosis. Previously seen by Anastasiia Bullard NP; new patient to me.      Imaging has shown hepatic steatosis.   No imaging findings to suggest advanced liver disease.     Risk factors for MASLD include:   BMI >30  HLD, pre-diabetes, hypothyroidism, JADA  *She has also been on Tamoxifen x 6 years (had BRCA twice)    Alcohol use: none    Her transaminases have been normal-mildly elevated, 30-50s.  Liver function and platelet count are normal.    Serologic workup has been negative for autoimmune etiology and viral hepatitis.    Fibrosis staging:   Fibroscan 11/2023: F3 (kPa 10), S3     Interval history:  Feels well overall, no new concerns from liver standpoint.  No symptoms of hepatic decompensation including jaundice, ascites, cognitive problems to suggest hepatic encephalopathy, or GI bleeding.     Remains on Tamoxifen. Was planning to take this indefinitely though will be meeting with a new oncologist to discuss options soon.    Updates on risk factors for MASLD:  Weight -- BMI 32      Lost ~10 lb over the last 6 months                   Cholesterol -- well controlled                                 Insulin resistance / diabetes -- improved, on metformin   Lab Results   Component Value Date    HGBA1C 5.6 02/10/2025          Liver enzymes: WNL    Health Maintenance:  - HCC screening: [facility] US 3/25/25 without focal hepatic lesion; AFP 4.1  - Hepatitis A & B vaccination: immunity unknown        Past medical history, surgical history, problem list, family history, social history, allergies: Reviewed and updated in the appropriate section of the electronic medical record.      Current Medications[1]  Medication list reviewed and updated.      Review of Systems - as per HPI  Constitutional: Negative  for unexpected weight change.   Respiratory: Negative for shortness of breath.    Cardiovascular: Negative for leg swelling.  Gastrointestinal: Negative for abdominal distention or abdominal pain. Negative for melena or hematemesis.  Musculoskeletal: Negative for myalgias.    Skin: Negative for jaundice or itching.  Neurological: Negative for confusion or slowed mentation. Negative for tremors.   Hematological: Does not bruise/bleed easily.       Physical Exam - limited by virtual visit  Constitutional: No distress. Alert and oriented.  Pulmonary/Chest: No respiratory distress.   Skin: No jaundice.   Psychiatric: Normal mood and affect. Speech, behavior, and thought content normal.        LABS & DIAGNOSTIC STUDIES     I have personally reviewed pertinent laboratory findings:    Lab Results   Component Value Date    ALT 22 03/25/2025    AST 21 03/25/2025    ALKPHOS 52 03/25/2025    BILITOT 0.3 03/25/2025    ALBUMIN 3.7 03/25/2025    INR 1.1 06/04/2024       Lab Results   Component Value Date    WBC 5.78 03/25/2025    HGB 13.1 03/25/2025    HCT 40.9 03/25/2025     (H) 03/25/2025     03/25/2025       Lab Results   Component Value Date     03/25/2025    K 4.8 03/25/2025    BUN 19 03/25/2025    CREATININE 0.7 03/25/2025       Lab Results   Component Value Date    SMOOTHMUSCAB Negative 1:40 11/01/2023    AMAIFA Negative 1:40 11/01/2023    ANASCREEN Positive (A) 11/17/2022    CPK 33 05/15/2024    HEPCAB Non-reactive 11/01/2023    HEPCAB Non-reactive 11/01/2023       Lab Results   Component Value Date    AFP 4.1 03/25/2025         I have personally reviewed the following result reports:  Abdominal US - 3/25/25      ASSESSMENT & PLAN     74 y.o. female with:    1. Metabolic dysfunction-associated steatotic liver disease (MASLD)    2. Hepatic fibrosis, stage 3    3. Other hyperlipidemia    4. Prediabetes    5. Class 1 obesity with serious comorbidity and body mass index (BMI) of 32.0 to 32.9 in adult,  unspecified obesity type    6. Long-term current use of tamoxifen          Fibroscan previously showed significant steatosis but also suggested advanced liver fibross (F3). Her liver enzymes have normalized with weight loss and improved blood sugar control. She has also been on Tamoxifen x 6 years.     Recommendations:   Repeat Fibroscan now to reassess fibrosis staging- to be completed in [place]. Will then determine plan for liver monitoring.  If Fibroscan still suggests F3 or 4, will need to continue HCC screening every 6 months (next US due 9/2025)  Discussed liver risks with Tamoxifen, specifically steatohepatitis and fibrosis. Recommend she discuss options with her Oncologist to see if there is another medication that may not carry these risks.   Weight loss efforts- commended on recent success, encouraged to continue  Mediterranean diet; limit carbohydrates/sugars and processed foods, increase protein/fiber. Moderate exercise (150 min/week) as tolerated.   Maintain good control of blood pressure, cholesterol, and blood sugar  Recommend vaccination for hepatitis A/B if not immune      Orders Placed This Encounter   Procedures    FibroScan Transplant Hepatology(Vibration Controlled Transient Elastography)       Return to clinic pending results.      Thank you for allowing me to participate in the care of Alison Stein, T[facility]  Hepatology        The patient location is: [place], [place]  The chief complaint leading to consultation is: f/u for fatty liver with hepatic fibrosis    Visit type: audiovisual    Face to Face time with patient: 18 min  30 minutes of total time spent on the encounter, which includes face to face time and non-face to face time preparing to see the patient (eg, review of tests), Obtaining and/or reviewing separately obtained history, Documenting clinical information in the electronic or other health record, Independently interpreting results (not separately  reported) and communicating results to the patient/family/caregiver, or Care coordination (not separately reported).     Each patient to whom he or she provides medical services by telemedicine is:  (1) informed of the relationship between the physician and patient and the respective role of any other health care provider with respect to management of the patient; and (2) notified that he or she may decline to receive medical services by telemedicine and may withdraw from such care at any time.           [1]   Current Outpatient Medications   Medication Sig Dispense Refill    atorvastatin (LIPITOR) 20 MG tablet TAKE 1 TABLET(20 MG) BY MOUTH EVERY DAY 90 tablet 1    cholecalciferol, vitamin D3, (VITAMIN D3) 125 mcg (5,000 unit) Tab Take 5,000 Units by mouth once daily.      coQ10, ubiquinol, 200 mg Cap Take 1 capsule by mouth once daily.      fluocinolone acetonide oiL 0.01 % Drop Place 5 drops in ear(s) 2 (two) times daily as needed (dry ear). (Patient not taking: Reported on 4/9/2025) 20 mL 0    FLUoxetine 20 MG capsule Take 1 capsule by mouth once daily.      fluticasone propionate (FLONASE) 50 mcg/actuation nasal spray SHAKE LIQUID AND USE 1 SPRAY(50 MCG) IN EACH NOSTRIL EVERY DAY 16 g 0    hydroCHLOROthiazide (MICROZIDE) 12.5 mg capsule Take 1 capsule by mouth every morning.      levothyroxine (SYNTHROID) 25 MCG tablet TAKE 1 TABLET(25 MCG) BY MOUTH BEFORE BREAKFAST 90 tablet 1    metFORMIN (GLUCOPHAGE) 1000 MG tablet Take 1 tablet (1,000 mg total) by mouth 2 (two) times daily with meals. 180 tablet 3    metoprolol succinate (TOPROL-XL) 50 MG 24 hr tablet Take 1 tablet (50 mg total) by mouth once daily. 30 tablet 11    montelukast (SINGULAIR) 10 mg tablet TAKE 1 TABLET(10 MG) BY MOUTH EVERY EVENING 90 tablet 1    tamoxifen (NOLVADEX) 20 MG Tab TAKE 1 TABLET BY MOUTH EVERY DAY 90 tablet 3    traZODone (DESYREL) 50 MG tablet Take 50 mg by mouth nightly as needed for Insomnia.      turmeric (CURCUMIN MISC) 1,400 mg  by Misc.(Non-Drug; Combo Route) route once daily.      UNABLE TO FIND Take 1,000 mg by mouth. B complex vitamins      venlafaxine (EFFEXOR) 37.5 MG Tab Take 37.5 mg by mouth once daily. With plan to taper off      zinc gluconate 50 mg tablet Take 50 mg by mouth once daily.       No current facility-administered medications for this visit.

## 2025-05-06 PROBLEM — K74.02 HEPATIC FIBROSIS, STAGE 3: Status: ACTIVE | Noted: 2025-05-06

## 2025-05-06 PROBLEM — K76.0 METABOLIC DYSFUNCTION-ASSOCIATED STEATOTIC LIVER DISEASE (MASLD): Status: ACTIVE | Noted: 2024-01-19

## 2025-06-11 ENCOUNTER — LAB VISIT (OUTPATIENT)
Dept: LAB | Facility: HOSPITAL | Age: 75
End: 2025-06-11
Payer: MEDICARE

## 2025-06-11 ENCOUNTER — TELEPHONE (OUTPATIENT)
Dept: PRIMARY CARE CLINIC | Facility: CLINIC | Age: 75
End: 2025-06-11
Payer: MEDICARE

## 2025-06-11 DIAGNOSIS — R30.0 DYSURIA: ICD-10-CM

## 2025-06-11 DIAGNOSIS — R30.0 DYSURIA: Primary | ICD-10-CM

## 2025-06-11 LAB
BACTERIA #/AREA URNS AUTO: ABNORMAL /HPF
BILIRUB UR QL STRIP.AUTO: NEGATIVE
CLARITY UR: ABNORMAL
COLOR UR AUTO: YELLOW
GLUCOSE UR QL STRIP: NEGATIVE
HGB UR QL STRIP: ABNORMAL
KETONES UR QL STRIP: NEGATIVE
LEUKOCYTE ESTERASE UR QL STRIP: ABNORMAL
MICROSCOPIC COMMENT: ABNORMAL
NITRITE UR QL STRIP: POSITIVE
PH UR STRIP: 6 [PH]
PROT UR QL STRIP: ABNORMAL
RBC #/AREA URNS AUTO: 32 /HPF (ref 0–4)
SP GR UR STRIP: 1.01
UROBILINOGEN UR STRIP-ACNC: NEGATIVE EU/DL
WBC #/AREA URNS AUTO: >100 /HPF (ref 0–5)
WBC CLUMPS UR QL AUTO: ABNORMAL

## 2025-06-11 PROCEDURE — 87186 SC STD MICRODIL/AGAR DIL: CPT

## 2025-06-11 PROCEDURE — 81003 URINALYSIS AUTO W/O SCOPE: CPT

## 2025-06-11 NOTE — TELEPHONE ENCOUNTER
Pt reports having discomfort after urinating -fever, abdominal pain or odor, requesting order for urinalysis, declined clinic visit. Will inform provider for order.           ----- Message from Rose sent at 6/11/2025  8:21 AM CDT -----  Regarding: self [phone]  .Type: Patient Call BackWho called: self What is the request in detail: Pt stated that she has a uti and requesting to get an order for urine testing asapCan the clinic reply by [facility]? Call back Would the patient rather a call back or a response via [facility]?  Call back Best call back number .[phone]

## 2025-06-12 ENCOUNTER — RESULTS FOLLOW-UP (OUTPATIENT)
Dept: PRIMARY CARE CLINIC | Facility: CLINIC | Age: 75
End: 2025-06-12
Payer: MEDICARE

## 2025-06-12 ENCOUNTER — TELEPHONE (OUTPATIENT)
Dept: PRIMARY CARE CLINIC | Facility: CLINIC | Age: 75
End: 2025-06-12
Payer: MEDICARE

## 2025-06-12 DIAGNOSIS — N30.01 ACUTE CYSTITIS WITH HEMATURIA: Primary | ICD-10-CM

## 2025-06-12 RX ORDER — CIPROFLOXACIN 250 MG/1
250 TABLET, FILM COATED ORAL 2 TIMES DAILY
Qty: 14 TABLET | Refills: 0 | Status: SHIPPED | OUTPATIENT
Start: 2025-06-12 | End: 2025-06-19

## 2025-06-12 RX ORDER — ATORVASTATIN CALCIUM 20 MG/1
20 TABLET, FILM COATED ORAL NIGHTLY
Qty: 90 TABLET | Refills: 1 | Status: SHIPPED | OUTPATIENT
Start: 2025-06-12

## 2025-06-12 NOTE — PROGRESS NOTES
Urine positive for UTI. Start taking Cipro 250 mg twice a day for 7 days. Stay hydrated. Awaiting urine culture results, but nitrite are present and WBC that indicate a positive UTI. I don't want to delay treatment.     Please message or call with any questions or concerns.   Thank you.   ABRIL SalesB[facility]

## 2025-06-12 NOTE — TELEPHONE ENCOUNTER
Spoke with pt and gave lab results and instructions.           ----- Message from ABRIL Sales sent at 6/12/2025  9:26 AM CDT -----  Urine positive for UTI. Start taking Cipro 250 mg twice a day for 7 days. Stay hydrated. Awaiting urine culture results, but nitrite are present and WBC that indicate a positive UTI. I don't want to   delay treatment.     Please message or call with any questions or concerns.   Thank you.   ABRIL Sales  [facility]  ----- Message -----  From: Lab, Background User  Sent: 6/11/2025  11:07 PM CDT  To: ABRIL Salinas

## 2025-06-14 LAB — BACTERIA UR CULT: ABNORMAL

## 2025-06-15 ENCOUNTER — PATIENT MESSAGE (OUTPATIENT)
Dept: PRIMARY CARE CLINIC | Facility: CLINIC | Age: 75
End: 2025-06-15
Payer: MEDICARE

## 2025-07-02 ENCOUNTER — RESULTS FOLLOW-UP (OUTPATIENT)
Dept: HEPATOLOGY | Facility: CLINIC | Age: 75
End: 2025-07-02

## 2025-07-02 ENCOUNTER — HOSPITAL ENCOUNTER (OUTPATIENT)
Dept: RADIOLOGY | Facility: HOSPITAL | Age: 75
Discharge: HOME | End: 2025-07-02
Attending: INTERNAL MEDICINE
Payer: MEDICARE

## 2025-07-02 DIAGNOSIS — K76.0 NAFLD (NONALCOHOLIC FATTY LIVER DISEASE): ICD-10-CM

## 2025-07-02 PROCEDURE — 76705 ECHO EXAM OF ABDOMEN: CPT | Mod: 26,,, | Performed by: STUDENT IN AN ORGANIZED HEALTH CARE EDUCATION/TRAINING PROGRAM

## 2025-07-02 PROCEDURE — 76705 ECHO EXAM OF ABDOMEN: CPT | Mod: TC

## 2025-07-03 ENCOUNTER — PATIENT MESSAGE (OUTPATIENT)
Dept: GASTROENTEROLOGY | Facility: CLINIC | Age: 75
End: 2025-07-03
Payer: MEDICARE

## 2025-07-28 ENCOUNTER — OFFICE VISIT (OUTPATIENT)
Dept: PRIMARY CARE CLINIC | Facility: CLINIC | Age: 75
End: 2025-07-28
Payer: MEDICARE

## 2025-07-28 VITALS
BODY MASS INDEX: 33.79 KG/M2 | HEART RATE: 81 BPM | WEIGHT: 183.63 LBS | OXYGEN SATURATION: 97 % | HEIGHT: 62 IN | SYSTOLIC BLOOD PRESSURE: 110 MMHG | DIASTOLIC BLOOD PRESSURE: 56 MMHG | TEMPERATURE: 97 F

## 2025-07-28 DIAGNOSIS — C50.912 MALIGNANT NEOPLASM OF LEFT BREAST IN FEMALE, ESTROGEN RECEPTOR POSITIVE, UNSPECIFIED SITE OF BREAST: Chronic | ICD-10-CM

## 2025-07-28 DIAGNOSIS — M48.02 STENOSIS OF CERVICAL SPINE WITH MYELOPATHY: ICD-10-CM

## 2025-07-28 DIAGNOSIS — R00.0 SINUS TACHYCARDIA: Chronic | ICD-10-CM

## 2025-07-28 DIAGNOSIS — Z17.0 MALIGNANT NEOPLASM OF LEFT BREAST IN FEMALE, ESTROGEN RECEPTOR POSITIVE, UNSPECIFIED SITE OF BREAST: Chronic | ICD-10-CM

## 2025-07-28 DIAGNOSIS — H90.A21 SENSORINEURAL HEARING LOSS (SNHL) OF RIGHT EAR WITH RESTRICTED HEARING OF LEFT EAR: Primary | ICD-10-CM

## 2025-07-28 DIAGNOSIS — G99.2 STENOSIS OF CERVICAL SPINE WITH MYELOPATHY: ICD-10-CM

## 2025-07-28 DIAGNOSIS — J30.2 SEASONAL ALLERGIC RHINITIS, UNSPECIFIED TRIGGER: Chronic | ICD-10-CM

## 2025-07-28 DIAGNOSIS — Z85.3 HISTORY OF RIGHT BREAST CANCER: ICD-10-CM

## 2025-07-28 PROCEDURE — 99999 PR PBB SHADOW E&M-EST. PATIENT-LVL IV: CPT | Mod: PBBFAC,,, | Performed by: INTERNAL MEDICINE

## 2025-07-28 PROCEDURE — 99214 OFFICE O/P EST MOD 30 MIN: CPT | Mod: S$PBB,,, | Performed by: INTERNAL MEDICINE

## 2025-07-28 PROCEDURE — 99214 OFFICE O/P EST MOD 30 MIN: CPT | Mod: PBBFAC,PN | Performed by: INTERNAL MEDICINE

## 2025-07-28 NOTE — PROGRESS NOTES
"Alison Lobo  07/28/2025  [id number]    Roselia Gillette MD  Patient Care Team:  Roselia Gillette MD as PCP - General (Internal Medicine)  Ishan Whatley MD (Inactive) as Consulting Physician (Breast Surgery)    Visit Type: Follow-up    Ms. Alison Lobo is a 74 year old female here for scheduled f/u.      Chronic medical issues include prediabetes, B Chun's neuroma/foot pain, JADA (CPAP), obesity, h/o R breast cancer 2002, h/o L breast cancer 2018 (both small invasive ductal carcinoma), recurrent MDD, MEHNAZ, HLD, hypothyroid, DISH, hepatic fibrosis due to NAFLD.    Discharged from Vestibular PT end of March w resolution of vertigo/nausea    History of Present Illness    CHIEF COMPLAINT:  Ms. Lobo presents today for follow up    ALLERGIES:  She reports ongoing allergy problems and is preparing for allergy testing. She's been advised to discontinue Metoprolol for one week prior to the allergy testing procedure, as it could limit effectiveness of epinephrine if she should experience severe allergic reaction in response to the allergy testing. Metoprolol is prescribed for a benign sinus tachycardia therefore advised ok to hold for one week.     ENT:  She has a history of vertigo and hearing issues. She completed vestibular physical therapy in March with complete resolution of vertigo symptoms. She currently experiences ongoing right ear hearing loss, which is not totally debilitating but creates significant difficulty in noisy, crowded environments. She previously tried a hearing aid that transferred sound from the affected side, which was unsuccessful. She is considering a bone implant at [facility] as a potential next treatment option. She describes current hearing issue as more of an annoyance than a severe impairment.    DEPRESSION:  She currently receives psychiatric care from Dr. Yates at [facility]. She recently restarted on Prozac, with improved mood and states "life is great". " She appears to be responding positively to medication change.    CANCER:  She had a recent mammogram with tomography performed a couple months ago, with results reviewed by medical oncologist. She continues Tamoxifen 20 mg daily, noting uncertainty about its statistical efficacy but tolerating the medication without significant side effects. Her most recent oncology follow-up visit was earlier this month. She reports significant reduction in hot flashes since switched to taking Tamoxifen at night.    GENITOURINARY:  She has a history of urinary tract infections - two in the past year. Advised further evaluation may be warranted if they become more frequent.    NEUROLOGIC:  She reports experiencing a pinched nerve in her neck with associated minor numbness. She describes occasional mild neck aches that do not significantly impair neck mobility, noting she can turn her neck without major difficulty. MRI findings indicate cervical spinal stenosis with associated spinal cord edema. She is currently contemplating management options, including potential surgical intervention recommended by her neurosurgeon, but is not experiencing severe symptoms that are causing significant concern at present and prefers to avoid surgery unless truly necessary. Advised to monitor closely for loss of strength or function which would be indication for more urgent intervention.    ROS:  General: -fever, -chills, -fatigue, -weight gain, -weight loss  Eyes: -vision changes, -redness, -discharge  ENT: -ear pain, -nasal congestion, -sore throat, +hearing loss  Cardiovascular: -chest pain, -palpitations, -lower extremity edema  Respiratory: -cough, -shortness of breath  Gastrointestinal: -abdominal pain, -nausea, -vomiting, -diarrhea, -constipation, -blood in stool  Genitourinary: -dysuria, -hematuria, -frequency  Musculoskeletal: -joint pain, -muscle pain, +neck pain  Skin: -rash, -lesion  Neurological: -headache, -dizziness, +numbness,  -tingling  Psychiatric: -anxiety, -depression, -sleep difficulty  Endocrine: +hot flashes  Allergic: +allergic reactions        PHQ-4 Score: 0     From LOV w me 3/12/2025  CHIEF COMPLAINT:  Ms. Lobo presents today for follow up of right ear hearing loss.  BILATERAL HEARING LOSS:  She was evaluated by ENT Dr. Fitz JONAS for right ear hearing loss and received treatment with 2 weeks of oral steroids followed by 3 intratympanic steroid injections. She has regained minimal hearing in the right ear, though ENT is not optimistic about further improvement. She experiences right ear fullness that worsens throughout the day but improves in the morning and when lying down. Also reports persistent tinnitus and occasional earache on the right. She initially experienced balance issues, nausea, and dizziness which have since improved with vestibular therapy @ [facility]. She also reports mild hearing loss in the left ear. She has trialed two sets of hearing aids, reporting they only amplify sound without improving clarity. She is working with an audiologist but feels her condition has not yet stabilized sufficiently to proceed with final hearing aid fitting. She reports relief of dry/itching right ear canal with Fluocinolone 0.01% ear drops as needed.   CERVICAL SPINE:  She has cervical stenosis described as significant by neurosurgeon. She reports history of pinched nerve in cervical spine in November with minimal residual numbness in right fifth finger only. Denies pain or loss of function.  LIVER:  Previous fibroscan showed severe steatosis with advanced fibrosis. Recent labs were reportedly normal per hepatologist's assessment. Has not yet established w new Hepatologist, Dr. Marie and overdue for surveillance US.   MUSCULOSKELETAL:  Would like to start PT for Chun's neuroma once vestibular therapy completed.   EXERCISE:  She has resumed gym attendance after a period of being limited to walking for  exercise.  MEDICATIONS:  She continues Metoprolol with good effect. She is doing well with transition from venlafaxine to fluoxetine.   COVID HISTORY:  She had a positive COVID antibodies test approximately one month ago. Last COVID vaccine was administered in late September 2024.    Recent appointments:   5/02/25 Hep Emil MATHUR with hepatic fibrosis (virtual-[place])  4/09/25 O65+ David E[facility]V    Recent external appointments:   7/14/25 Heme/Onc [facility]   6/16/25 NeuroPsych [place] insomnia JADA MDD/anxiety [facility]  6/12/25 Otolaryng [place] sudden hearing loss right [facility]  5/01/25 Otolaryng [place] [facility]  4/16/25 [place] [facility] cervical spinal stenosis [facility]  4/14/25 Otolaryng [place] [facility]  4/07/25 Otolaryng [place] [facility]  3/21/25 Otolaryng [place] [facility]  3/17/25 NeuroPsych [place] insomnia JADA MDD/anxiety [facility]    Upcoming appointments:  Future Appointments       Date Provider Specialty Appt Notes    10/20/2025 Juani Hernandez, [facility] 3 mon f/u          The following were reviewed: Active problem list, medication list, allergies, family history, social history, and Health Maintenance.     Medications have been reviewed and reconciled with patient at visit today.    Exam: sat 97%  Vitals:    07/28/25 1130   BP: (!) 110/56   Pulse: 81   Temp: 97.2 °F (36.2 °C)     Weight: 83.3 kg (183 lb 10.3 oz)   Body mass index is 33.37 kg/m².    BP Readings from Last 3 Encounters:   07/28/25 (!) 110/56   04/09/25 (!) 110/54   03/12/25 110/60      Wt Readings from Last 3 Encounters:   07/28/25 1130 83.3 kg (183 lb 10.3 oz)   04/09/25 0906 83.4 kg (183 lb 15.6 oz)   03/12/25 0815 84.5 kg (186 lb 2.9 oz)      Physical Exam  Vitals reviewed.   Constitutional:       General: She is not in acute distress.     Appearance: Normal appearance. She is obese. She is not ill-appearing.   HENT:      Head: Normocephalic and atraumatic.      Right Ear: External ear normal.      Left Ear: External ear  "normal.      Nose: Nose normal.      Mouth/Throat:      Mouth: Mucous membranes are moist.      Pharynx: Oropharynx is clear.   Eyes:      General: No scleral icterus.     Extraocular Movements: Extraocular movements intact.      Conjunctiva/sclera: Conjunctivae normal.   Cardiovascular:      Rate and Rhythm: Normal rate.   Pulmonary:      Effort: Pulmonary effort is normal. No respiratory distress.   Skin:     General: Skin is warm and dry.   Neurological:      Mental Status: She is alert and oriented to person, place, and time. Mental status is at baseline.      Comments: Ambulated independently w/o AD   Psychiatric:         Mood and Affect: Mood normal.         Behavior: Behavior normal.         Thought Content: Thought content normal.         Judgment: Judgment normal.        Laboratory Reviewed  Lab Results   Component Value Date    WBC 5.78 03/25/2025    HGB 13.1 03/25/2025    HCT 40.9 03/25/2025     03/25/2025     (H) 03/25/2025    CHOL 137 02/10/2025    TRIG 127 02/10/2025    HDL 36 (L) 02/10/2025    LDLCALC 75.6 02/10/2025    ALT 22 03/25/2025    AST 21 03/25/2025     03/25/2025    K 4.8 03/25/2025     03/25/2025    CREATININE 0.7 03/25/2025    BUN 19 03/25/2025    CO2 24 03/25/2025    MG 2.2 01/18/2024    TSH 1.438 02/10/2025    FREET4 0.96 02/10/2025    INR 1.1 06/04/2024    HGBA1C 5.6 02/10/2025    CRP 1.1 12/15/2022     Lab Results   Component Value Date    CALCIUM 9.7 03/25/2025    PHOS 3.5 02/10/2025      Lab Results   Component Value Date    YIGQYSLK89 534 02/10/2025     Lab Results   Component Value Date    FOLATE 13.7 09/23/2024    No results found for: "UIBC", "IRON", "TRANS", "TRANSFERRIN", "TIBC", "LABIRON", "FESATURATED"   Lab Results   Component Value Date    EGFRNORACEVR >60 03/25/2025    ALBUMIN 3.7 03/25/2025     Lab Results   Component Value Date    [id number] 55 02/10/2025        Assessment:   75 y.o. female with multiple co-morbid illnesses here for continued " follow up of medical problems.      The primary encounter diagnosis was Sensorineural hearing loss (SNHL) of right ear with restricted hearing of left ear. Diagnoses of Sinus tachycardia, History of right breast cancer, Malignant neoplasm of left breast in female, estrogen receptor positive, unspecified site of breast, Stenosis of cervical spine with myelopathy, and Seasonal allergic rhinitis, unspecified trigger were also pertinent to this visit.      Plan:   1. Sensorineural hearing loss (SNHL) of right ear with restricted hearing of left ear  Assessment & Plan:  Considering f/u w hearing specialist @ [facility]      2. Sinus tachycardia  Assessment & Plan:  Ok to hold metoprolol for 1 week for allergy testing      3. History of right breast cancer  Overview:  h/o R breast cancer 2002 (small invasive ductal carcinoma)    Assessment & Plan:  Cont f/u w [facility] @ [facility] (now Dr. Gonzalez) and Breast Surgery [facility] (NP [facility]? Dr. Crenshaw)      4. Malignant neoplasm of left breast in female, estrogen receptor positive, unspecified site of breast  Overview:  L breast cancer 2018 (small invasive ductal carcinoma)  Cont tamoxifen & on-going surveillance       5. Stenosis of cervical spine with myelopathy  Overview:  MRI C Spine 10/21/24: diffuse DJD changes, C3-4 DJD, L HNP, L FS, C4-5 DJD/HNP, mod CS, B FS, C5-6 DJD/HNP, mod/severe CS, B FS, C6-7 DJD/HNP, mod CS, B FS, C7-T1 bulge, B FS   Small area of subtle, ill-defined, mildly increased T2 signal in the spinal cord at the point of compression and severe canal stenosis at C5-C6 is concerning for mild edema or myelomalacia.  This is localized to an area of only less than 5 mm in length at this disc space    Assessment & Plan:  Referred from office of [facility]. [facility] [place] RAFA[facility] Per Ms. Lashell Jean recommending surgical intervention - she prefers to defer for now, monitor and cont caution in protection of C-spine  to extent able      6. Seasonal allergic rhinitis, unspecified trigger  Assessment & Plan:  Plan for allergy testing - ok to hold metoprolol 1 week prior           Health Maintenance         Date Due Completion Date    COVID-19 Vaccine (9 - 2024-25 season) 11/19/2024 9/24/2024    Influenza Vaccine (1) 09/01/2025 9/24/2024    Hemoglobin A1c (Prediabetes) 02/10/2026 2/10/2025    DEXA Scan 05/12/2027 5/12/2022    TETANUS VACCINE 05/09/2029 5/9/2019    Lipid Panel 02/10/2030 2/10/2025          -Patient's lab results were reviewed and discussed with patient  -Treatment options and alternatives were discussed with the patient. Patient expressed understanding. Patient was given the opportunity to ask questions and be an active participant in their medical care. Patient had no further questions or concerns at this time.     Follow up: No follow-ups on file.    Care Plan/Goals: Reviewed Yes   Goals         exercise (pt-stated)       Achievable - within patient's control: Yes    Difficulties Identified: don't like exercising indoors - getting started    Plan for Overcoming difficulties: set intention for 3x week chair yoga or cardio at [facility] or O65+ starting Thursd ya - plan to log so can monitor persistence    Timeframe for completion: 3 mos                    After visit summary printed and given to patient upon discharge.  Patient goals and care plan are included in After visit summary.    TOTAL TIME evaluating and managing this patient for this encounter was 36 minutes. This time was spent personally by me on some of the following activities: review of patient's past medical history, assessing age-appropriate health maintenance needs, review of any interval history, review and interpretation of lab results, review and interpretation of imaging test results, review and interpretation of cardiology test results, reviewing consulting specialist notes, obtaining history from the patient and family, examination of the patient,  medication reconciliation, managing and/or ordering prescription medications, ordering imaging tests, ordering referral to subspecialty provider(s), educating patient and answering their questions about diagnosis, treatment plan, and goals of treatment, discussing planned follow-up and final documentation of the visit. This time was exclusive of any separately billable procedures for this patient and exclusive of time spent treating any other patients.     This note was generated with the assistance of ambient listening technology. Verbal consent was obtained by the patient and accompanying visitor(s) for the recording of patient appointment to facilitate this note. I attest to having reviewed and edited the generated note for accuracy, though some syntax or spelling errors may persist. Please contact the author of this note for any clarification.

## 2025-07-28 NOTE — PATIENT INSTRUCTIONS
If you are feeling unwell, we'd like to be the first ones to know here at [facility]! Please give us a call. Same day appointments are our top priority to keep you well and out of the emergency rooms and hospitals. Call [phone] for our direct line. After hours advice is always available. Please call [phone] after hours to speak to the on-call team.      Ok to hold metoprolol for one week for allergy testing

## 2025-08-03 PROBLEM — M48.02 STENOSIS OF CERVICAL SPINE WITH MYELOPATHY: Status: ACTIVE | Noted: 2025-03-12

## 2025-08-03 PROBLEM — G99.2 STENOSIS OF CERVICAL SPINE WITH MYELOPATHY: Status: ACTIVE | Noted: 2025-03-12

## 2025-08-03 PROBLEM — Z00.00 ENCOUNTER FOR MEDICARE ANNUAL WELLNESS EXAM: Status: RESOLVED | Noted: 2025-04-09 | Resolved: 2025-08-03

## 2025-08-03 NOTE — ASSESSMENT & PLAN NOTE
Referred from office of Dr. Valdivia [facility] to RAFAEL Jean [facility] [place] [facility]. Per MsAayush Lashell Dr. Jean recommending surgical intervention - she prefers to defer for now, monitor and cont caution in protection of C-spine to extent able

## 2025-08-03 NOTE — ASSESSMENT & PLAN NOTE
Cont f/u w Heme/Onc @ [facility] (now Dr. Gonzalez) and Breast Surgery [facility] (NP Marvel/[facility]? Dr. Crenshaw)